# Patient Record
Sex: MALE | Race: WHITE | NOT HISPANIC OR LATINO | Employment: FULL TIME | URBAN - METROPOLITAN AREA
[De-identification: names, ages, dates, MRNs, and addresses within clinical notes are randomized per-mention and may not be internally consistent; named-entity substitution may affect disease eponyms.]

---

## 2017-01-18 ENCOUNTER — TELEPHONE (OUTPATIENT)
Dept: INTERNAL MEDICINE | Age: 42
End: 2017-01-18

## 2017-01-19 ENCOUNTER — OFFICE VISIT (OUTPATIENT)
Dept: INTERNAL MEDICINE | Age: 42
End: 2017-01-19

## 2017-01-19 VITALS
BODY MASS INDEX: 27.55 KG/M2 | OXYGEN SATURATION: 98 % | HEART RATE: 68 BPM | SYSTOLIC BLOOD PRESSURE: 100 MMHG | DIASTOLIC BLOOD PRESSURE: 62 MMHG | HEIGHT: 69 IN | TEMPERATURE: 98.5 F | WEIGHT: 186 LBS

## 2017-01-19 DIAGNOSIS — G89.29 CHRONIC MIDLINE LOW BACK PAIN WITHOUT SCIATICA: Primary | ICD-10-CM

## 2017-01-19 DIAGNOSIS — M62.838 MUSCLE SPASM: ICD-10-CM

## 2017-01-19 DIAGNOSIS — M54.50 CHRONIC MIDLINE LOW BACK PAIN WITHOUT SCIATICA: Primary | ICD-10-CM

## 2017-01-19 PROCEDURE — 99214 OFFICE O/P EST MOD 30 MIN: CPT | Performed by: NURSE PRACTITIONER

## 2017-01-19 RX ORDER — CEPHALEXIN 500 MG/1
500 CAPSULE ORAL
COMMUNITY
Start: 2017-01-18 | End: 2017-01-23

## 2017-01-19 RX ORDER — CYCLOBENZAPRINE HCL 5 MG
10 TABLET ORAL 3 TIMES DAILY PRN
Qty: 21 TABLET | Refills: 0 | Status: SHIPPED | OUTPATIENT
Start: 2017-01-19 | End: 2017-01-27 | Stop reason: SDUPTHER

## 2017-01-19 NOTE — MR AVS SNAPSHOT
Sadiq CECILIO Alexander   1/19/2017 9:40 AM   Office Visit    Dept Phone:  [phone]   Encounter #:  [id number]    Provider:  CIERA Luna   Department:  [facility]                Your Full Care Plan              Today's Medication Changes          These changes are accurate as of: 1/19/17 10:56 AM.  If you have any questions, ask your nurse or doctor.               Medication(s)that have changed:     cyclobenzaprine 5 MG tablet   Commonly known as:  FLEXERIL   Take 2 tablets by mouth 3 (Three) Times a Day As Needed for muscle spasms.   What changed:    - how much to take  - when to take this   Changed by:  CIERA Luna            Where to Get Your Medications      These medications were sent to [facility]/pharmacy #[id number] - [place], [place] - [address] - [phone]  - [phone]   [address], [facility] [address]     Phone:  [phone]     cyclobenzaprine 5 MG tablet                  Your Updated Medication List          This list is accurate as of: 1/19/17 10:56 AM.  Always use your most recent med list.                cephalexin 500 MG capsule   Commonly known as:  KEFLEX       cyclobenzaprine 5 MG tablet   Commonly known as:  FLEXERIL   Take 2 tablets by mouth 3 (Three) Times a Day As Needed for muscle spasms.       ZOLMitriptan 5 MG disintegrating tablet   Commonly known as:  ZOMIG-ZMT               We Performed the Following     MRI Lumbar Spine Without Contrast       You Were Diagnosed With        Codes Comments    Chronic midline low back pain without sciatica    -  Primary ICD-10-CM: M54.5, G89.29  ICD-9-CM: 724.2, 338.29     Muscle spasm     ICD-10-CM: M62.838  ICD-9-CM: 728.85       Instructions     None    Patient Instructions History      Upcoming Appointments     Visit Type Date Time Department    OFFICE VISIT 1/19/2017  9:40 AM YANET BARRETO 4007      [employer] Signup     [facility] [facility] allows you to send  "messages to your doctor, view your test results, renew your prescriptions, schedule appointments, and more. To sign up, go to [email or web address] and click on the Sign Up Now link in the New User? box. Enter your [employer] Activation Code exactly as it appears below along with the last four digits of your Social Security Number and your Date of Birth () to complete the sign-up process. If you do not sign up before the expiration date, you must request a new code.    [employer] Activation Code: [email or web address]  Expires: 2017  5:40 AM    If you have questions, you can email [email or web address] or call [phone] to talk to our [employer] staff. Remember, [employer] is NOT to be used for urgent needs. For medical emergencies, dial 911.               Other Info from Your Visit           Allergies     No Known Allergies      Reason for Visit     Back Pain F/U back pain  still in pain    thumb injury Cut thumb @ home last night F/U er visit [facility] / in care everywhere      Vital Signs     Blood Pressure Pulse Temperature Height Weight Oxygen Saturation    100/62 68 98.5 °F (36.9 °C) 69\" (175.3 cm) 186 lb (84.4 kg) 98%    Body Mass Index Smoking Status                27.47 kg/m2 Former Smoker          Problems and Diagnoses Noted     Chronic midline low back pain without sciatica    -  Primary    Muscle spasm            "

## 2017-01-19 NOTE — PROGRESS NOTES
Sadiq HERNANDES Alexander / 41 y.o. / male  01/19/2017      CC:  Main reason(s) for today's visit: Back Pain (F/U back pain 12/19 still in pain) and thumb injury (Cut thumb @ home last night F/U er visit [facility] / in care everywhere)      HPI:   Patient states that he is still having low back pain. X-ray lumbar spine was performed and results show L1 compression. States that the pain is more muscular in nature. States the muscle pain is constant. He reports that the pain does radiate to the right hip, but not on a consistent basis. Takes ibuprofen for pain. States that he has also had chiropractic care and PT with dry needling. Patient also states that he has used TENS unit. He does have a PT that he works with.  Patient state that last night he cut the tip of the left thumb and had stitches placed at  [facility].     The following portions of the patient's history were reviewed and updated as appropriate: past medical history and past surgical history.    ASSESSMENT & PLAN:    Problem List Items Addressed This Visit     None      Visit Diagnoses     Chronic midline low back pain without sciatica    -  Primary    Relevant Medications    cyclobenzaprine (FLEXERIL) 5 MG tablet    Other Relevant Orders    MRI Lumbar Spine Without Contrast    Muscle spasm        Relevant Medications    cyclobenzaprine (FLEXERIL) 5 MG tablet        Orders Placed This Encounter   Procedures   • MRI Lumbar Spine Without Contrast       · Summary/Discussion:     Chronic LBP: Patient with H/O chronic low back pain here for follow-up. States pain is still present despite rest, PT and medication. X-ray lumbar spine reviewed and reveled L-1 compression. Patient will be ordered MRI of lumbar spine for further evaluation.  Depending on results of MRI, may consider referral to pain management for possible epidural injections or trigger point injections.  He will be given a medication for muscle spasms and advised to refrain from driving, or activities that  require extreme mental alertness as this medication may cause dizziness and drowsiness.  Patient was advised to contact the office if symptoms do not improve.    Follow-up: No Follow-up on file.     No future appointments.  ____________________________________________________________________    REVIEW OF SYSTEMS    Review of Systems   Constitutional: Negative for activity change, appetite change, chills, diaphoresis, fatigue and fever.   HENT: Negative for congestion, dental problem, ear discharge, facial swelling, hearing loss, mouth sores, sore throat, trouble swallowing and voice change.    Eyes: Negative.    Respiratory: Negative for apnea, cough, choking, chest tightness, shortness of breath, wheezing and stridor.    Cardiovascular: Negative for chest pain, palpitations and leg swelling.   Gastrointestinal: Negative for abdominal distention, abdominal pain, anal bleeding, blood in stool, constipation, diarrhea, nausea, rectal pain and vomiting.   Endocrine: Negative for cold intolerance and heat intolerance.   Genitourinary: Negative for decreased urine volume, difficulty urinating, dysuria, enuresis, flank pain, frequency, genital sores, hematuria and urgency.   Musculoskeletal: Positive for arthralgias (Chronic low back pain. ). Negative for back pain, gait problem, joint swelling, myalgias, neck pain and neck stiffness.   Skin: Negative for color change, pallor, rash and wound.   Allergic/Immunologic: Negative for environmental allergies, food allergies and immunocompromised state.   Neurological: Negative for dizziness, tremors, seizures, syncope, facial asymmetry, speech difficulty, weakness, light-headedness, numbness and headaches.   Hematological: Negative for adenopathy. Does not bruise/bleed easily.   Psychiatric/Behavioral: Negative for agitation, confusion, decreased concentration, self-injury, sleep disturbance and suicidal ideas. The patient is not nervous/anxious and is not hyperactive.    All  "other systems reviewed and are negative.    Other: As noted per HPI      VITALS    Visit Vitals   • /62   • Pulse 68   • Temp 98.5 °F (36.9 °C)   • Ht 69\" (175.3 cm)   • Wt 186 lb (84.4 kg)   • SpO2 98%   • BMI 27.47 kg/m2     BP Readings from Last 3 Encounters:   01/19/17 100/62   12/19/16 114/72   09/02/16 110/64     Wt Readings from Last 3 Encounters:   01/19/17 186 lb (84.4 kg)   12/19/16 182 lb (82.6 kg)   09/06/16 180 lb (81.6 kg)      Body mass index is 27.47 kg/(m^2).    PHYSICAL EXAMINATION    Physical Exam   Constitutional: He is oriented to person, place, and time. He appears well-developed and well-nourished.   HENT:   Head: Normocephalic.   Eyes: Conjunctivae are normal. Pupils are equal, round, and reactive to light.   Neck: Normal range of motion.   Cardiovascular: Normal rate and regular rhythm.    Pulmonary/Chest: Effort normal and breath sounds normal.   Musculoskeletal: Normal range of motion.   Neurological: He is alert and oriented to person, place, and time.   Skin: Skin is warm and dry.   Psychiatric: He has a normal mood and affect. His behavior is normal.   Vitals reviewed.        REVIEWED DATA:    Labs:   Lab Results   Component Value Date     02/13/2015    K 3.4 (L) 02/13/2015    AST 17 02/13/2015    ALT 19 02/13/2015    BUN 19 02/13/2015    CREATININE 1.34 (H) 02/13/2015       No results found for: GLU, HGBA1C    No results found for: LDL, HDL, TRIG, CHOLHDLRATIO    No results found for: TSH, FREET4       Lab Results   Component Value Date    WBC 7.62 02/13/2015    HGB 15.6 02/13/2015     02/13/2015        Imaging:        Medical Tests:        Summary of old records / correspondence / consultant report:        Request outside records:          ALLERGIES:    Review of patient's allergies indicates no known allergies.    MEDICATIONS:  Current Outpatient Prescriptions   Medication Sig Dispense Refill   • cephalexin (KEFLEX) 500 MG capsule Take 500 mg by mouth.     • " cyclobenzaprine (FLEXERIL) 5 MG tablet Take 2 tablets by mouth 3 (Three) Times a Day As Needed for muscle spasms. 21 tablet 0   • ZOLMitriptan (ZOMIG-ZMT) 5 MG disintegrating tablet Take  by mouth.       No current facility-administered medications for this visit.        [facility]    The following portions of the patient's history were reviewed and updated as appropriate: Allergies / Current Medications / Past Medical History / Surgical History / Social History / Family History    PROBLEM LIST:    Patient Active Problem List   Diagnosis   • Colon polyp   • Internal hemorrhoids   • Migraine   • Nicotine dependence       PAST MEDICAL HX:    Past Medical History   Diagnosis Date   • Migraines        PAST SURGICAL HX:    Past Surgical History   Procedure Laterality Date   • Shoulder surgery Bilateral      Right 2/18/1965/Left 6/2005   • Colonoscopy  2015     Dr. Allen       SOCIAL HX:    Social History     Social History   • Marital status: Unknown     Spouse name: N/A   • Number of children: 3   • Years of education: N/A     Social History Main Topics   • Smoking status: Former Smoker   • Smokeless tobacco: Never Used   • Alcohol use Yes      Comment: Rarely   • Drug use: No   • Sexual activity: Yes     Partners: Male     Other Topics Concern   • None     Social History Narrative       FAMILY HX:    History reviewed. No pertinent family history.

## 2017-01-25 ENCOUNTER — HOSPITAL ENCOUNTER (OUTPATIENT)
Dept: MRI IMAGING | Facility: HOSPITAL | Age: 42
Discharge: HOME | End: 2017-01-25
Admitting: NURSE PRACTITIONER

## 2017-01-25 PROCEDURE — 72148 MRI LUMBAR SPINE W/O DYE: CPT

## 2017-01-27 ENCOUNTER — OFFICE VISIT (OUTPATIENT)
Dept: INTERNAL MEDICINE | Age: 42
End: 2017-01-27

## 2017-01-27 VITALS
HEIGHT: 69 IN | WEIGHT: 187 LBS | HEART RATE: 76 BPM | BODY MASS INDEX: 27.7 KG/M2 | DIASTOLIC BLOOD PRESSURE: 74 MMHG | TEMPERATURE: 97.8 F | SYSTOLIC BLOOD PRESSURE: 108 MMHG | OXYGEN SATURATION: 98 %

## 2017-01-27 DIAGNOSIS — S61.012A LACERATION OF LEFT THUMB, INITIAL ENCOUNTER: ICD-10-CM

## 2017-01-27 DIAGNOSIS — M54.40 CHRONIC MIDLINE LOW BACK PAIN WITH SCIATICA, SCIATICA LATERALITY UNSPECIFIED: Primary | ICD-10-CM

## 2017-01-27 DIAGNOSIS — M62.838 MUSCLE SPASM: ICD-10-CM

## 2017-01-27 DIAGNOSIS — M54.50 CHRONIC MIDLINE LOW BACK PAIN WITHOUT SCIATICA: ICD-10-CM

## 2017-01-27 DIAGNOSIS — G89.29 CHRONIC MIDLINE LOW BACK PAIN WITHOUT SCIATICA: ICD-10-CM

## 2017-01-27 DIAGNOSIS — G89.29 CHRONIC MIDLINE LOW BACK PAIN WITH SCIATICA, SCIATICA LATERALITY UNSPECIFIED: Primary | ICD-10-CM

## 2017-01-27 PROCEDURE — 99214 OFFICE O/P EST MOD 30 MIN: CPT | Performed by: NURSE PRACTITIONER

## 2017-01-27 RX ORDER — CYCLOBENZAPRINE HCL 5 MG
10 TABLET ORAL 3 TIMES DAILY PRN
Qty: 42 TABLET | Refills: 0 | Status: SHIPPED | OUTPATIENT
Start: 2017-01-27 | End: 2017-02-14 | Stop reason: SDUPTHER

## 2017-01-27 NOTE — MR AVS SNAPSHOT
Sadiq Alexander   1/27/2017 9:00 AM   Office Visit    Dept Phone:  [phone]   Encounter #:  [id number]    Provider:  CIERA Luna   Department:  [facility]                Your Full Care Plan              Where to Get Your Medications      These medications were sent to [facility]/pharmacy #[id number] - [place], [place] - [address] - [phone]  - [phone]   [address], [facility] [address]     Phone:  [phone]     cyclobenzaprine 5 MG tablet            Your Updated Medication List          This list is accurate as of: 1/27/17  9:58 AM.  Always use your most recent med list.                cyclobenzaprine 5 MG tablet   Commonly known as:  FLEXERIL   Take 2 tablets by mouth 3 (Three) Times a Day As Needed for muscle spasms.       ZOLMitriptan 5 MG disintegrating tablet   Commonly known as:  ZOMIG-ZMT               We Performed the Following     Ambulatory Referral to Orthopedic Surgery       You Were Diagnosed With        Codes Comments    Chronic midline low back pain with sciatica, sciatica laterality unspecified    -  Primary ICD-10-CM: M54.40, G89.29  ICD-9-CM: 724.2, 724.3, 338.29     Muscle spasm     ICD-10-CM: M62.838  ICD-9-CM: 728.85     Chronic midline low back pain without sciatica     ICD-10-CM: M54.5, G89.29  ICD-9-CM: 724.2, 338.29       Instructions     None    Patient Instructions History      Upcoming Appointments     Visit Type Date Time Department    OFFICE VISIT 1/27/2017  9:00 AM YANET KINNEY KRSGE 4002      [employer] Signup     [employer] allows you to send messages to your doctor, view your test results, renew your prescriptions, schedule appointments, and more. To sign up, go to [email or web address] and click on the Sign Up Now link in the New User? box. Enter your [employer] Activation Code exactly as it appears below along with the last four digits of your Social Security Number and your Date of Birth  "() to complete the sign-up process. If you do not sign up before the expiration date, you must request a new code.    [employer] Activation Code: [email or web address]  Expires: 2/10/2017  9:57 AM    If you have questions, you can email [email or web address] or call [phone] to talk to our [employer] staff. Remember, [employer] is NOT to be used for urgent needs. For medical emergencies, dial 911.               Other Info from Your Visit           Allergies     No Known Allergies      Reason for Visit     Suture / Staple Removal Stures removed from leeft thumb    Back Pain F/U MRI results / in chart / media tab      Vital Signs     Blood Pressure Pulse Temperature Height Weight Oxygen Saturation    108/74 76 97.8 °F (36.6 °C) 69\" (175.3 cm) 187 lb (84.8 kg) 98%    Body Mass Index Smoking Status                27.62 kg/m2 Former Smoker          Problems and Diagnoses Noted     Chronic midline low back pain with sciatica, sciatica laterality unspecified    -  Primary    Muscle spasm        Chronic midline low back pain without sciatica            "

## 2017-01-27 NOTE — PROGRESS NOTES
Sadiq HERNANDES Alexander / 41 y.o. / male  01/27/2017      CC:  Main reason(s) for today's visit: Suture / Staple Removal (Stures removed from leeft thumb) and Back Pain (F/U MRI results / in chart / media tab)      HPI:   Patient presents to the office to have staples removed from his left thumb.  Patient had cut his last thumb while chopping up vegetables.  He went to the [facility] where sutures were placed in, and patient was placed on antibiotics.  He is also here to review the MRI results from previous visit.      The following portions of the patient's history were reviewed and updated as appropriate: past medical history and past surgical history.    ASSESSMENT & PLAN:    Problem List Items Addressed This Visit     None      Visit Diagnoses     Chronic midline low back pain with sciatica, sciatica laterality unspecified    -  Primary    Relevant Orders    Ambulatory Referral to Orthopedic Surgery    Muscle spasm        Relevant Medications    cyclobenzaprine (FLEXERIL) 5 MG tablet    Chronic midline low back pain without sciatica        Relevant Medications    cyclobenzaprine (FLEXERIL) 5 MG tablet    Laceration of left thumb, initial encounter            Orders Placed This Encounter   Procedures   • Ambulatory Referral to Orthopedic Surgery       · Summary/Discussion:     1.  Chronic midline low back pain with sciatica: Patient with chronic low back pain with sciatica.  At previous visit MRI was ordered to evaluate this complaint.  I reviewed MRI and discuss the results with this patient.  MRI results show relatively mild foraminal narrowing is identified within the lower lumbar spine from L3-4 down to L5-S1.  Results also show moderate facet arthropathy.  Patient will be referred to orthopedic for further evaluation.    2.  Muscle spasm: Patient with complaints of chronic muscle spasm in the low back.  Patient has had physical therapy and has helped minimally.  Patient will be given a muscle relaxer to be  used as directed.  He was advised to refrain from driving or any activities that require extreme mental alertness as this may cause dizziness or drowsiness.  Patient was advised to monitor for worsening of symptoms and contact the office if symptoms do not improve.     3.  Laceration left thumb: Patient is laceration in the left thumb resulted when patient was cutting vegetables and sliced the tip of his thumb.  He was evaluated at the [facility] where he was placed on antibiotics and sutures placed.  Patient presented to the office for suture removal.  We removed 3 stitches from the left thumb.  Patient tolerated procedure well.  The skin of the left thumb remains intact.  No oozing, redness, swelling noted.  We applied topical antibiotic ointment to the thumb.  Advised patient to monitor for any signs of infection to include swelling, redness, heat, discharge.  Patient was also advised to monitor the site and contact the office if the site of the laceration reopens.  If this should happen he is to contact the office immediately.     Follow-up: Return if symptoms worsen or fail to improve.     No future appointments.  ____________________________________________________________________    REVIEW OF SYSTEMS    Review of Systems   Constitutional: Negative for activity change, appetite change, chills, diaphoresis, fatigue and fever.   HENT: Negative for congestion, dental problem, ear discharge, facial swelling, hearing loss, mouth sores, sore throat, trouble swallowing and voice change.    Eyes: Negative.    Respiratory: Negative for apnea, cough, choking, chest tightness, shortness of breath, wheezing and stridor.    Cardiovascular: Negative for chest pain, palpitations and leg swelling.   Gastrointestinal: Negative for abdominal distention, abdominal pain, anal bleeding, blood in stool, constipation, diarrhea, nausea, rectal pain and vomiting.   Endocrine: Negative for cold intolerance and heat  "intolerance.   Genitourinary: Negative for decreased urine volume, difficulty urinating, dysuria, enuresis, flank pain, frequency, genital sores, hematuria and urgency.   Musculoskeletal: Negative for arthralgias, back pain, gait problem, joint swelling, myalgias, neck pain and neck stiffness.        Chronic low back pain and muscle spasm   Skin: Negative for color change, pallor, rash and wound.        Patient has sutures in the left thumb.   Allergic/Immunologic: Negative for environmental allergies, food allergies and immunocompromised state.   Neurological: Negative for dizziness, tremors, seizures, syncope, facial asymmetry, speech difficulty, weakness, light-headedness, numbness and headaches.   Hematological: Negative for adenopathy. Does not bruise/bleed easily.   Psychiatric/Behavioral: Negative for agitation, confusion, decreased concentration, self-injury, sleep disturbance and suicidal ideas. The patient is not nervous/anxious and is not hyperactive.    All other systems reviewed and are negative.    Other: As noted per HPI      VITALS    Visit Vitals   • /74   • Pulse 76   • Temp 97.8 °F (36.6 °C)   • Ht 69\" (175.3 cm)   • Wt 187 lb (84.8 kg)   • SpO2 98%   • BMI 27.62 kg/m2     BP Readings from Last 3 Encounters:   01/27/17 108/74   01/19/17 100/62   12/19/16 114/72     Wt Readings from Last 3 Encounters:   01/27/17 187 lb (84.8 kg)   01/19/17 186 lb (84.4 kg)   01/25/17 180 lb (81.6 kg)      Body mass index is 27.62 kg/(m^2).    PHYSICAL EXAMINATION    Physical Exam   Constitutional: He is oriented to person, place, and time. He appears well-developed and well-nourished.   HENT:   Head: Normocephalic.   Eyes: Conjunctivae are normal. Pupils are equal, round, and reactive to light.   Neck: Normal range of motion.   Cardiovascular: Normal rate and regular rhythm.    Pulmonary/Chest: Effort normal and breath sounds normal.   Musculoskeletal: Normal range of motion.   Neurological: He is alert and " oriented to person, place, and time.   Skin: Skin is warm and dry.   Patient right thumb with sutures. We removed the sutures and the skin is intact. No redness, heat or swelling noted. Patient is taking oral antibiotics at this time.   Psychiatric: He has a normal mood and affect. His behavior is normal.   Vitals reviewed.        REVIEWED DATA:    Labs:   Lab Results   Component Value Date     02/13/2015    K 3.4 (L) 02/13/2015    AST 17 02/13/2015    ALT 19 02/13/2015    BUN 19 02/13/2015    CREATININE 1.34 (H) 02/13/2015       No results found for: GLU, HGBA1C    No results found for: LDL, HDL, TRIG, CHOLHDLRATIO    No results found for: TSH, FREET4       Lab Results   Component Value Date    WBC 7.62 02/13/2015    HGB 15.6 02/13/2015     02/13/2015        Imaging:        Medical Tests:        Summary of old records / correspondence / consultant report:        Request outside records:          ALLERGIES:    Review of patient's allergies indicates no known allergies.    MEDICATIONS:  Current Outpatient Prescriptions   Medication Sig Dispense Refill   • cyclobenzaprine (FLEXERIL) 5 MG tablet Take 2 tablets by mouth 3 (Three) Times a Day As Needed for muscle spasms. 42 tablet 0   • ZOLMitriptan (ZOMIG-ZMT) 5 MG disintegrating tablet Take  by mouth.       No current facility-administered medications for this visit.        [facility]    The following portions of the patient's history were reviewed and updated as appropriate: Allergies / Current Medications / Past Medical History / Surgical History / Social History / Family History    PROBLEM LIST:    Patient Active Problem List   Diagnosis   • Colon polyp   • Internal hemorrhoids   • Migraine   • Nicotine dependence       PAST MEDICAL HX:    Past Medical History   Diagnosis Date   • Migraines        PAST SURGICAL HX:    Past Surgical History   Procedure Laterality Date   • Shoulder surgery Bilateral      Right 2/18/1965/Left 6/2005   • Colonoscopy  2015       Tiffany       SOCIAL HX:    Social History     Social History   • Marital status: Unknown     Spouse name: N/A   • Number of children: 3   • Years of education: N/A     Social History Main Topics   • Smoking status: Former Smoker   • Smokeless tobacco: Never Used   • Alcohol use Yes      Comment: Rarely   • Drug use: No   • Sexual activity: Yes     Partners: Male     Other Topics Concern   • None     Social History Narrative   • None       FAMILY HX:    History reviewed. No pertinent family history.

## 2017-02-13 ENCOUNTER — TELEPHONE (OUTPATIENT)
Dept: INTERNAL MEDICINE | Age: 42
End: 2017-02-13

## 2017-02-14 ENCOUNTER — TELEPHONE (OUTPATIENT)
Dept: INTERNAL MEDICINE | Age: 42
End: 2017-02-14

## 2017-02-14 DIAGNOSIS — M62.838 MUSCLE SPASM: ICD-10-CM

## 2017-02-14 DIAGNOSIS — M54.50 CHRONIC MIDLINE LOW BACK PAIN WITHOUT SCIATICA: ICD-10-CM

## 2017-02-14 DIAGNOSIS — G89.29 CHRONIC MIDLINE LOW BACK PAIN WITHOUT SCIATICA: ICD-10-CM

## 2017-02-14 RX ORDER — CYCLOBENZAPRINE HCL 5 MG
10 TABLET ORAL 3 TIMES DAILY PRN
Qty: 30 TABLET | Refills: 0 | Status: SHIPPED | OUTPATIENT
Start: 2017-02-14 | End: 2017-05-24

## 2017-02-22 ENCOUNTER — OFFICE VISIT (OUTPATIENT)
Dept: ORTHOPEDIC SURGERY | Facility: CLINIC | Age: 42
End: 2017-02-22

## 2017-02-22 VITALS — BODY MASS INDEX: 26.66 KG/M2 | TEMPERATURE: 97.5 F | HEIGHT: 69 IN | WEIGHT: 180 LBS

## 2017-02-22 DIAGNOSIS — M54.50 CHRONIC LOW BACK PAIN WITHOUT SCIATICA, UNSPECIFIED BACK PAIN LATERALITY: Primary | ICD-10-CM

## 2017-02-22 DIAGNOSIS — G89.29 CHRONIC LOW BACK PAIN WITHOUT SCIATICA, UNSPECIFIED BACK PAIN LATERALITY: Primary | ICD-10-CM

## 2017-02-22 PROCEDURE — 99204 OFFICE O/P NEW MOD 45 MIN: CPT | Performed by: ORTHOPAEDIC SURGERY

## 2017-02-22 NOTE — PROGRESS NOTES
New patient or new problem visit    Chief Complaint   Patient presents with   • Lumbar Spine - Pain       HPI: He complains of moderate, intermittent stabbing aching low back pain without radiation.  The aggravated by standing sitting and driving and helped with anti-inflammatory agents and rest.  Physical therapy is helped somewhat and he started that 3 weeks ago.  Did some.  Trooping in the  and thinks him of the trauma may have resulted from this.    PFSH: See chart- reviewed    Review of Systems   Constitutional: Negative for chills, fever and unexpected weight change.   HENT: Negative.  Negative for trouble swallowing and voice change.    Eyes: Negative.  Negative for visual disturbance.   Respiratory: Negative.  Negative for cough and shortness of breath.    Cardiovascular: Negative.  Negative for chest pain and leg swelling.   Gastrointestinal: Negative.  Negative for abdominal pain, nausea and vomiting.   Endocrine: Negative.  Negative for cold intolerance and heat intolerance.   Genitourinary: Negative.  Negative for difficulty urinating, frequency and urgency.   Musculoskeletal: Positive for back pain.   Skin: Negative.  Negative for rash and wound.   Allergic/Immunologic: Negative.  Negative for immunocompromised state.   Neurological: Positive for weakness. Negative for numbness.   Hematological: Negative.  Does not bruise/bleed easily.   Psychiatric/Behavioral: Negative.  Negative for dysphoric mood. The patient is not nervous/anxious.        PE: Constitutional: Vital signs above-noted.  Awake, alert and oriented    Psychiatric: Affect and insight do not appear grossly disturbed.    Pulmonary: Breathing is unlabored, color is good.    Skin: Warm, dry and normal turgor    Cardiac:  pedal pulses intact.  No edema.    Eyesight and hearing appear adequate for examination purposes      Musculoskeletal:  There is slight tenderness to percussion and palpation of the left lumbar spine. Motion appears  undisturbed.  Posture is unremarkable to coronal and sagittal inspection.    The skin about the area is intact.  There is no palpable or visible deformity.  There is moderate local spasm.       Neurologic:   Reflexes are absent in the patellae and achilles.   Motor function is undisturbed in quadriceps, EHL, and gastrocnemius      Sensation appears symmetrically intact to light touch   .  In the bilateral lower extremities there is no evidence of atrophy.   Clonus is absent..  Gait appears undisturbed.  SLR test negative      MEDICAL DECISION MAKING    XRAY: Multiple x-rays of lumbar spine were reviewed and demonstrate the very slight trapezoidal shape of the L1 vertebra suggestive of old fracture, and mild multilevel disc degeneration.  I agree with the radiologist report.  I scan of the lumbar spine showed L1 2 disc space narrowing and T2 signal change and then some signal change in the lower reaches, need by left L3 4 far lateral disc protrusion.  The radiologist did not make as much of the disc protrusion and did comment on posterior synovial cyst at L5-S1 which I don't think clinically relevant.    Other: n/a    Impression: Lumbar back pain from disc degeneration and facet arthropathy    Plan: Commended cessation of smoking, cardiovascular exercise continued resistance exercises and physical therapy, and when necessary follow-up.

## 2017-03-29 ENCOUNTER — TELEPHONE (OUTPATIENT)
Dept: INTERNAL MEDICINE | Age: 42
End: 2017-03-29

## 2017-03-29 DIAGNOSIS — G43.909 MIGRAINE WITHOUT STATUS MIGRAINOSUS, NOT INTRACTABLE, UNSPECIFIED MIGRAINE TYPE: Primary | ICD-10-CM

## 2017-03-29 RX ORDER — ZOLMITRIPTAN 5 MG/1
TABLET, ORALLY DISINTEGRATING ORAL
Qty: 10 TABLET | Refills: 1 | Status: SHIPPED | OUTPATIENT
Start: 2017-03-29 | End: 2017-12-31 | Stop reason: SDUPTHER

## 2017-03-30 ENCOUNTER — TELEPHONE (OUTPATIENT)
Dept: INTERNAL MEDICINE | Age: 42
End: 2017-03-30

## 2017-03-30 DIAGNOSIS — J06.9 UPPER RESPIRATORY TRACT INFECTION, UNSPECIFIED TYPE: Primary | ICD-10-CM

## 2017-03-30 RX ORDER — AMOXICILLIN 500 MG/1
1000 CAPSULE ORAL 2 TIMES DAILY
Qty: 14 CAPSULE | Refills: 0 | Status: SHIPPED | OUTPATIENT
Start: 2017-03-30 | End: 2017-05-24

## 2017-03-30 NOTE — TELEPHONE ENCOUNTER
Pt informed that Dr Jackson was out of the office for the day but Mode took the message since she is on call and switched his medication to amox. 500 mg bid # 14 7 days rf

## 2017-05-24 ENCOUNTER — OFFICE VISIT (OUTPATIENT)
Dept: SURGERY | Facility: CLINIC | Age: 42
End: 2017-05-24

## 2017-05-24 VITALS — BODY MASS INDEX: 27.34 KG/M2 | HEIGHT: 69 IN | OXYGEN SATURATION: 98 % | WEIGHT: 184.6 LBS | HEART RATE: 67 BPM

## 2017-05-24 DIAGNOSIS — K64.5 THROMBOSED EXTERNAL HEMORRHOID: Primary | ICD-10-CM

## 2017-05-24 PROCEDURE — 46320 REMOVAL OF HEMORRHOID CLOT: CPT | Performed by: SURGERY

## 2017-05-31 ENCOUNTER — OFFICE VISIT (OUTPATIENT)
Dept: SURGERY | Facility: CLINIC | Age: 42
End: 2017-05-31

## 2017-05-31 VITALS — OXYGEN SATURATION: 98 % | HEART RATE: 57 BPM | BODY MASS INDEX: 26.94 KG/M2 | WEIGHT: 182.4 LBS

## 2017-05-31 DIAGNOSIS — K64.5 PERIANAL VENOUS THROMBOSIS: Primary | ICD-10-CM

## 2017-05-31 PROCEDURE — 99024 POSTOP FOLLOW-UP VISIT: CPT | Performed by: SURGERY

## 2017-10-23 ENCOUNTER — OFFICE VISIT (OUTPATIENT)
Dept: INTERNAL MEDICINE | Age: 42
End: 2017-10-23

## 2017-10-23 ENCOUNTER — TELEPHONE (OUTPATIENT)
Dept: INTERNAL MEDICINE | Age: 42
End: 2017-10-23

## 2017-10-23 VITALS
SYSTOLIC BLOOD PRESSURE: 114 MMHG | HEIGHT: 69 IN | DIASTOLIC BLOOD PRESSURE: 68 MMHG | BODY MASS INDEX: 27.78 KG/M2 | WEIGHT: 187.6 LBS | TEMPERATURE: 98 F | OXYGEN SATURATION: 99 % | HEART RATE: 84 BPM

## 2017-10-23 DIAGNOSIS — J01.90 ACUTE SINUSITIS, RECURRENCE NOT SPECIFIED, UNSPECIFIED LOCATION: Primary | ICD-10-CM

## 2017-10-23 PROCEDURE — 99213 OFFICE O/P EST LOW 20 MIN: CPT | Performed by: NURSE PRACTITIONER

## 2017-10-23 RX ORDER — BENZONATATE 200 MG/1
200 CAPSULE ORAL 3 TIMES DAILY PRN
Qty: 30 CAPSULE | Refills: 0 | Status: SHIPPED | OUTPATIENT
Start: 2017-10-23 | End: 2018-02-26

## 2017-10-23 RX ORDER — FLUTICASONE PROPIONATE 50 MCG
2 SPRAY, SUSPENSION (ML) NASAL DAILY
COMMUNITY
Start: 2017-10-23 | End: 2022-02-16

## 2017-10-23 NOTE — PROGRESS NOTES
Subjective   Sadiq Alexander is a 42 y.o. male.     URI    This is a new problem. The current episode started yesterday. There has been no fever. Associated symptoms include chest pain, congestion, coughing, sinus pain, a sore throat and wheezing. Pertinent negatives include no ear pain or joint pain. Associated symptoms comments: + nasal congestion, productive cough (green sputum) . Treatments tried: Mucinex.        The following portions of the patient's history were reviewed and updated as appropriate: allergies, current medications, past family history, past medical history, past social history, past surgical history and problem list.    Review of Systems   Constitutional: Negative for fatigue.   HENT: Positive for congestion, sinus pain and sore throat. Negative for ear pain.    Respiratory: Positive for cough and wheezing.    Cardiovascular: Positive for chest pain.   Musculoskeletal: Negative for joint pain.       Objective   Physical Exam   Constitutional: He is oriented to person, place, and time. Vital signs are normal. He appears well-developed and well-nourished. He is cooperative. He does not appear ill. No distress.   HENT:   Head: Normocephalic and atraumatic.   Right Ear: Hearing, external ear and ear canal normal. No drainage or swelling. Tympanic membrane is not injected and not erythematous. A middle ear effusion is present.   Left Ear: Hearing, tympanic membrane, external ear and ear canal normal. No drainage or swelling. Tympanic membrane is not injected and not erythematous.  No middle ear effusion.   Nose: Right sinus exhibits maxillary sinus tenderness and frontal sinus tenderness. Left sinus exhibits maxillary sinus tenderness and frontal sinus tenderness.   Mouth/Throat: Uvula is midline, oropharynx is clear and moist and mucous membranes are normal. Tonsils are 2+ on the right. Tonsils are 2+ on the left. No tonsillar exudate.   Cardiovascular: Normal rate, regular rhythm and normal heart  sounds.    No murmur heard.  Pulmonary/Chest: Effort normal and breath sounds normal. He has no decreased breath sounds. He has no wheezes. He has no rhonchi. He has no rales.   Lymphadenopathy:     He has no cervical adenopathy.        Right cervical: No superficial cervical, no deep cervical and no posterior cervical adenopathy present.       Left cervical: No superficial cervical, no deep cervical and no posterior cervical adenopathy present.   Neurological: He is alert and oriented to person, place, and time.   Skin: Skin is warm, dry and intact.   Psychiatric: He has a normal mood and affect. His speech is normal and behavior is normal. Judgment and thought content normal. Cognition and memory are normal.   Nursing note and vitals reviewed.      Assessment/Plan   Problems Addressed this Visit     None      Visit Diagnoses     Acute sinusitis, recurrence not specified, unspecified location    -  Primary    Relevant Medications    benzonatate (TESSALON) 200 MG capsule    fluticasone (FLONASE) 50 MCG/ACT nasal spray        1. Acute sinusitis, recurrence not specified, unspecified     Discussed appropriate conservative and symptomatic treatment with patient, including use of NSAIDs or Tylenol for fever or pain, increase by mouth fluids, and rest. Discussed when to follow up for recheck, including worsening symptoms such as fever, purulent nasal drainage, worsening cough, productive cough, etc.     - benzonatate (TESSALON) 200 MG capsule; Take 1 capsule by mouth 3 (Three) Times a Day As Needed for Cough.  Dispense: 30 capsule; Refill: 0  - fluticasone (FLONASE) 50 MCG/ACT nasal spray; 2 sprays into each nostril Daily.

## 2017-10-23 NOTE — TELEPHONE ENCOUNTER
Pt called complaining of chest/ head congestion started yesterday. Productive thick yellow sputum. Bad cough, hurts throat and chest when he coughs. Hasn't taken temp.  Pt's # [phone]  Thanks SP

## 2017-12-31 DIAGNOSIS — G43.909 MIGRAINE WITHOUT STATUS MIGRAINOSUS, NOT INTRACTABLE, UNSPECIFIED MIGRAINE TYPE: ICD-10-CM

## 2018-01-02 RX ORDER — ZOLMITRIPTAN 5 MG/1
TABLET, ORALLY DISINTEGRATING ORAL
Qty: 10 TABLET | Refills: 0 | Status: SHIPPED | OUTPATIENT
Start: 2018-01-02 | End: 2018-02-26 | Stop reason: SDUPTHER

## 2018-02-26 ENCOUNTER — OFFICE VISIT (OUTPATIENT)
Dept: INTERNAL MEDICINE | Age: 43
End: 2018-02-26

## 2018-02-26 VITALS
SYSTOLIC BLOOD PRESSURE: 112 MMHG | TEMPERATURE: 98.3 F | OXYGEN SATURATION: 98 % | HEIGHT: 69 IN | HEART RATE: 70 BPM | WEIGHT: 192 LBS | DIASTOLIC BLOOD PRESSURE: 68 MMHG | BODY MASS INDEX: 28.44 KG/M2

## 2018-02-26 DIAGNOSIS — G43.909 MIGRAINE WITHOUT STATUS MIGRAINOSUS, NOT INTRACTABLE, UNSPECIFIED MIGRAINE TYPE: ICD-10-CM

## 2018-02-26 DIAGNOSIS — F41.9 ANXIETY DISORDER, UNSPECIFIED TYPE: ICD-10-CM

## 2018-02-26 DIAGNOSIS — G43.909 MIGRAINE WITHOUT STATUS MIGRAINOSUS, NOT INTRACTABLE, UNSPECIFIED MIGRAINE TYPE: Primary | ICD-10-CM

## 2018-02-26 PROCEDURE — 99214 OFFICE O/P EST MOD 30 MIN: CPT | Performed by: INTERNAL MEDICINE

## 2018-02-26 RX ORDER — ESCITALOPRAM OXALATE 10 MG/1
10 TABLET ORAL DAILY
Qty: 30 TABLET | Refills: 1 | Status: SHIPPED | OUTPATIENT
Start: 2018-02-26 | End: 2018-04-22 | Stop reason: SDUPTHER

## 2018-02-26 NOTE — ASSESSMENT & PLAN NOTE
Will retry escitalopram to see if it will help with his migraines, in part triggered by anxiety/tension/stress.

## 2018-02-26 NOTE — PROGRESS NOTES
"[facility]  SEJAL THACKER M.D.      Sadiq HERNANDES Alexander / 42 y.o. / male  02/26/2018      MEDICATIONS  Current Outpatient Prescriptions   Medication Sig Dispense Refill   • fluticasone (FLONASE) 50 MCG/ACT nasal spray 2 sprays into each nostril Daily.     • ZOLMitriptan (ZOMIG-ZMT) 5 MG disintegrating tablet DISSOLVE 1 TAB ON TONGUE AND SWALLOW AT ONSET OF MIGRAINE. MAY REPEAT ONCE AFTER 2H. MAX 10 MG/DAY 10 tablet 0       VITALS    Visit Vitals   • /68   • Pulse 70   • Temp 98.3 °F (36.8 °C)   • Ht 175.3 cm (69.02\")   • Wt 87.1 kg (192 lb)   • SpO2 98%   • BMI 28.34 kg/m2       BP Readings from Last 3 Encounters:   02/26/18 112/68   10/23/17 114/68   01/27/17 108/74     Wt Readings from Last 3 Encounters:   02/26/18 87.1 kg (192 lb)   10/23/17 85.1 kg (187 lb 9.6 oz)   05/31/17 82.7 kg (182 lb 6.4 oz)      Body mass index is 28.34 kg/(m^2).      CC:  Main reason(s) for today's visit: Migraine (general check up)      HPI:     Patient complains of increased frequency of migraine headaches.  He has had chronic migraine headaches over many years.  She has taken various try cyclic's as well as topiramate in the past.  The last time he has taken Topamax caused mood agitation.  He has had various tolerance issues to try cyclic antidepressants.  He did the best on Lexapro previously.  He has normal level of work related and family related stress.  Zomig continues to help him when taken as needed for migraine headaches.  Denies any other focal neurologic changes.  Known triggers include lack of sleep, stress and anxiety.  Beer or alcohol also seems to trigger migraine headaches the following day.    Patient Care Team:  Charly Thacker MD as PCP - General (Internal Medicine)  ____________________________________________________________________    ASSESSMENT & PLAN:    Problem List Items Addressed This Visit        High    Migraines - Primary    Current Assessment & Plan     Increasing migraine frequency since smoking " cessation. Escitalopram has helped in the past. Could not tolerate topiramate due to increased agitation sx's in the past. Will retry escitalopram 10 mg daily (1/2 x 1 week, then full tab daily). Instructed patient to watch for worsening mood symptoms, suicidal thoughts/ideations.   Continue zolmitriptan as needed. If not improving will refer back to neurologist.          Relevant Medications    ZOLMitriptan (ZOMIG-ZMT) 5 MG disintegrating tablet    escitalopram (LEXAPRO) 10 MG tablet       Medium    Anxiety disorder (Chronic)    Overview     H/o ADHD.          Current Assessment & Plan     Will retry escitalopram to see if it will help with his migraines, in part triggered by anxiety/tension/stress.          Relevant Medications    escitalopram (LEXAPRO) 10 MG tablet           Summary/Discussion:  ·     Return in about 2 months (around 4/26/2018) for Headaches.  And 4 months for CPE.     Future Appointments  Date Time Provider Department Center   4/24/2018 1:40 PM MD [facility] None   6/13/2018 9:00 AM [facility] None   6/20/2018 10:00 AM MD [facility] None     ____________________________________________________________________    REVIEW OF SYSTEMS    Review of Systems   Constitutional: Negative.    Eyes: Negative.    Respiratory: Negative.    Cardiovascular: Negative.    Gastrointestinal: Negative.    Neurological: Positive for headaches.   Psychiatric/Behavioral: The patient is nervous/anxious.          PHYSICAL EXAMINATION    Physical Exam   Constitutional: No distress.   HENT:   Head: Normocephalic.   Eyes: EOM are normal. Pupils are equal, round, and reactive to light.   Cardiovascular: Normal rate and regular rhythm.    Neurological: He is alert.   Psychiatric: His behavior is normal. Judgment and thought content normal. His mood appears anxious. Cognition and memory are normal. He does not exhibit a depressed mood.         REVIEWED DATA:    Labs:       Imaging:         Medical Tests:        Summary of old records / correspondence / consultant report:        Request outside records:       ALLERGIES  No Known Allergies     PFSH:     The following portions of the patient's history were reviewed and updated as appropriate: Allergies / Current Medications / Past Medical History / Surgical History / Social History / Family History    PROBLEM LIST   Patient Active Problem List   Diagnosis   • Colon polyp   • Internal hemorrhoids   • Migraines   • Nicotine dependence   • Anxiety disorder       PAST MEDICAL HISTORY  Past Medical History:   Diagnosis Date   • Colon polyps    • Migraines        SURGICAL HISTORY  Past Surgical History:   Procedure Laterality Date   • COLONOSCOPY N/A 03/11/2015    Two 4-6 mm polyps at the recto-sigmoid and in the mid ascending colon (PATH: tubulovillous adenoma w/ low-grade dysplasia & hyperplastic colon polyp at 20 cm), internal hemorrhoids; Dr. Chato Allen   • SHOULDER SURGERY Bilateral 2/18/1965, 6/2005       SOCIAL HISTORY  Social History     Social History   • Marital status: Unknown     Spouse name: N/A   • Number of children: 3   • Years of education: N/A     Social History Main Topics   • Smoking status: Former Smoker   • Smokeless tobacco: Current User     Types: Chew      Comment: pt is trying to quit   • Alcohol use Yes      Comment: 2-3/week   • Drug use: No   • Sexual activity: Yes     Partners: Male     Other Topics Concern   • None     Social History Narrative       FAMILY HISTORY  History reviewed. No pertinent family history.      **Dragon Disclaimer:   Much of this encounter note is an electronic transcription/translation of spoken language to printed text. The electronic translation of spoken language may permit erroneous, or at times, nonsensical words or phrases to be inadvertently transcribed. Although I have reviewed the note for such errors, some may still exist.

## 2018-02-26 NOTE — ASSESSMENT & PLAN NOTE
Increasing migraine frequency since smoking cessation. Escitalopram has helped in the past. Could not tolerate topiramate due to increased agitation sx's in the past. Will retry escitalopram 10 mg daily (1/2 x 1 week, then full tab daily). Instructed patient to watch for worsening mood symptoms, suicidal thoughts/ideations.   Continue zolmitriptan as needed. If not improving will refer back to neurologist.

## 2018-02-27 RX ORDER — ZOLMITRIPTAN 5 MG/1
TABLET, ORALLY DISINTEGRATING ORAL
Qty: 10 TABLET | Refills: 0 | Status: SHIPPED | OUTPATIENT
Start: 2018-02-27 | End: 2018-09-13 | Stop reason: SDUPTHER

## 2018-04-22 DIAGNOSIS — F41.9 ANXIETY DISORDER, UNSPECIFIED TYPE: ICD-10-CM

## 2018-04-22 DIAGNOSIS — G43.909 MIGRAINE WITHOUT STATUS MIGRAINOSUS, NOT INTRACTABLE, UNSPECIFIED MIGRAINE TYPE: ICD-10-CM

## 2018-04-23 RX ORDER — ESCITALOPRAM OXALATE 10 MG/1
10 TABLET ORAL DAILY
Qty: 30 TABLET | Refills: 2 | Status: SHIPPED | OUTPATIENT
Start: 2018-04-23 | End: 2018-07-28 | Stop reason: SDUPTHER

## 2018-04-24 ENCOUNTER — OFFICE VISIT (OUTPATIENT)
Dept: INTERNAL MEDICINE | Age: 43
End: 2018-04-24

## 2018-04-24 VITALS
SYSTOLIC BLOOD PRESSURE: 118 MMHG | DIASTOLIC BLOOD PRESSURE: 70 MMHG | HEART RATE: 76 BPM | WEIGHT: 191 LBS | TEMPERATURE: 98.3 F | HEIGHT: 69 IN | OXYGEN SATURATION: 98 % | BODY MASS INDEX: 28.29 KG/M2

## 2018-04-24 DIAGNOSIS — F41.9 ANXIETY DISORDER, UNSPECIFIED TYPE: Chronic | ICD-10-CM

## 2018-04-24 DIAGNOSIS — G43.909 MIGRAINE WITHOUT STATUS MIGRAINOSUS, NOT INTRACTABLE, UNSPECIFIED MIGRAINE TYPE: Primary | ICD-10-CM

## 2018-04-24 PROCEDURE — 99213 OFFICE O/P EST LOW 20 MIN: CPT | Performed by: INTERNAL MEDICINE

## 2018-04-24 NOTE — PROGRESS NOTES
"[facility]  SEJAL THACKER M.D.      Sadiq CECILIO Alexander / 42 y.o. / male  04/24/2018      ASSESSMENT & PLAN:    Problem List Items Addressed This Visit        High    Migraines - Primary    Current Assessment & Plan     Better. Continue escitalopram and zolmitriptan as needed.            Relevant Medications    ZOLMitriptan (ZOMIG-ZMT) 5 MG disintegrating tablet    escitalopram (LEXAPRO) 10 MG tablet       Medium    Anxiety disorder (Chronic)    Overview     H/o ADHD.          Current Assessment & Plan     Doing much better. Continue escitalopram 10 mg daily.          Relevant Medications    escitalopram (LEXAPRO) 10 MG tablet      Other Visit Diagnoses    None.       No orders of the defined types were placed in this encounter.      Summary/Discussion:      Return in about 4 months (around 8/24/2018) for Next scheduled follow up.    ____________________________________________________________________    MEDICATIONS  Current Outpatient Prescriptions   Medication Sig Dispense Refill   • escitalopram (LEXAPRO) 10 MG tablet TAKE 1 TABLET BY MOUTH DAILY. 30 tablet 2   • fluticasone (FLONASE) 50 MCG/ACT nasal spray 2 sprays into each nostril Daily.     • ZOLMitriptan (ZOMIG-ZMT) 5 MG disintegrating tablet DISSOLVE 1 TAB ON TONGUE AND SWALLOW AT ONSET OF MIGRAINE. MAY REPEAT ONCE AFTER 2H. MAX 10 MG/DAY 10 tablet 0     No current facility-administered medications for this visit.          VITALS    Visit Vitals  /70   Pulse 76   Temp 98.3 °F (36.8 °C)   Ht 175.3 cm (69.02\")   Wt 86.6 kg (191 lb)   SpO2 98%   BMI 28.19 kg/m²       BP Readings from Last 3 Encounters:   04/24/18 118/70   02/26/18 112/68   10/23/17 114/68     Wt Readings from Last 3 Encounters:   04/24/18 86.6 kg (191 lb)   02/26/18 87.1 kg (192 lb)   10/23/17 85.1 kg (187 lb 9.6 oz)      Body mass index is 28.19 kg/m².    CC:  Main reason(s) for today's visit: migraines      HPI:     Migraine headaches are much better under control since starting " escitalopram.  Anxiety and tension and general mood are also significantly improved.  Denies problems with the medication.    Patient Care Team:  Charly Jackson MD as PCP - General (Internal Medicine)  ____________________________________________________________________    REVIEW OF SYSTEMS    Review of Systems  Decreased headaches  Mood improved, less anxious  GI neg   neg    PHYSICAL EXAMINATION    Physical Exam  Mood improved  Normal thought and judgment    REVIEWED DATA:    Labs:     Lab Results   Component Value Date     02/13/2015    K 3.4 (L) 02/13/2015    AST 17 02/13/2015    ALT 19 02/13/2015    BUN 19 02/13/2015    CREATININE 1.34 (H) 02/13/2015       Lab Results   Component Value Date    GLUCOSE 70 02/13/2015       No results found for: LDL, HDL, TRIG, CHOLHDLRATIO    No results found for: TSH, FREET4    Lab Results   Component Value Date    WBC 7.62 02/13/2015    HGB 15.6 02/13/2015     02/13/2015        Imaging:         Medical Tests:         Summary of old records / correspondence / consultant report:         Request outside records:         ALLERGIES  No Known Allergies     PFSH:     The following portions of the patient's history were reviewed and updated as appropriate: Allergies / Current Medications / Past Medical History / Surgical History / Social History / Family History    PROBLEM LIST   Patient Active Problem List   Diagnosis   • Colon polyp   • Internal hemorrhoids   • Migraines   • Nicotine dependence   • Anxiety disorder       PAST MEDICAL HISTORY  Past Medical History:   Diagnosis Date   • Anxiety    • Colon polyps    • Migraines        SURGICAL HISTORY  Past Surgical History:   Procedure Laterality Date   • COLONOSCOPY N/A 03/11/2015    Two 4-6 mm polyps at the recto-sigmoid and in the mid ascending colon (PATH: tubulovillous adenoma w/ low-grade dysplasia & hyperplastic colon polyp at 20 cm), internal hemorrhoids; Dr. Chato Allen   • SHOULDER SURGERY Bilateral  2/18/1965, 6/2005       SOCIAL HISTORY  Social History     Social History   • Marital status: Unknown   • Number of children: 3     Social History Main Topics   • Smoking status: Former Smoker   • Smokeless tobacco: Current User     Types: Chew      Comment: Quit smoking 2 months ago   • Alcohol use Yes      Comment: 2-3/week   • Drug use: No   • Sexual activity: Yes     Partners: Male     Other Topics Concern   • Not on file       FAMILY HISTORY  History reviewed. No pertinent family history.        **Dragon Disclaimer:   Much of this encounter note is an electronic transcription/translation of spoken language to printed text. The electronic translation of spoken language may permit erroneous, or at times, nonsensical words or phrases to be inadvertently transcribed. Although I have reviewed the note for such errors, some may still exist.

## 2018-06-14 DIAGNOSIS — Z00.00 PREVENTATIVE HEALTH CARE: Primary | ICD-10-CM

## 2018-06-20 ENCOUNTER — OFFICE VISIT (OUTPATIENT)
Dept: INTERNAL MEDICINE | Age: 43
End: 2018-06-20

## 2018-06-20 VITALS
DIASTOLIC BLOOD PRESSURE: 64 MMHG | SYSTOLIC BLOOD PRESSURE: 128 MMHG | BODY MASS INDEX: 27.85 KG/M2 | TEMPERATURE: 97.8 F | HEIGHT: 69 IN | HEART RATE: 86 BPM | OXYGEN SATURATION: 99 % | WEIGHT: 188 LBS

## 2018-06-20 DIAGNOSIS — F41.9 ANXIETY DISORDER, UNSPECIFIED TYPE: Chronic | ICD-10-CM

## 2018-06-20 DIAGNOSIS — G43.909 MIGRAINE WITHOUT STATUS MIGRAINOSUS, NOT INTRACTABLE, UNSPECIFIED MIGRAINE TYPE: ICD-10-CM

## 2018-06-20 DIAGNOSIS — Z00.00 ENCOUNTER FOR ANNUAL HEALTH EXAMINATION: Primary | ICD-10-CM

## 2018-06-20 PROCEDURE — 99396 PREV VISIT EST AGE 40-64: CPT | Performed by: INTERNAL MEDICINE

## 2018-06-20 NOTE — PROGRESS NOTES
[facility]  SEJAL THACKER M.D.      Sadiq HERNANDES Alexander / 42 y.o. / male  06/20/2018    CC: Annual Exam      HPI:      Sadiq presents for annual health maintenance visit.    · Last health maintenance visit: unsure  · General health: good  · Lifestyle:  · Attempting to lose weight?: No   · Diet: adequate/fair  · Exercise: adequate/fair  · Tobacco: uses smokeless nicotine and recently stopped smoking month ago   · Alcohol: does not drink  · Work: Full-time  · Reproductive health:  · Sexually active?: Yes   · Concern for STD?: No   · Sexual problems?: No problems   · Sees Urologist?: No   · Depression Screening:      PHQ-2/PHQ-9 Depression Screening 2/26/2018   Little interest or pleasure in doing things 0   Feeling down, depressed, or hopeless 0   Total Score 0         PHQ-2: 0 (Not depressed)     PHQ-9:     Patient Care Team:  Charly Thacker MD as PCP - General (Internal Medicine)  ______________________________________________________________________    ALLERGIES  No Known Allergies     MEDICATIONS  Current Outpatient Prescriptions   Medication Sig Dispense Refill   • escitalopram (LEXAPRO) 10 MG tablet TAKE 1 TABLET BY MOUTH DAILY. 30 tablet 2   • fluticasone (FLONASE) 50 MCG/ACT nasal spray 2 sprays into each nostril Daily.     • ZOLMitriptan (ZOMIG-ZMT) 5 MG disintegrating tablet DISSOLVE 1 TAB ON TONGUE AND SWALLOW AT ONSET OF MIGRAINE. MAY REPEAT ONCE AFTER 2H. MAX 10 MG/DAY 10 tablet 0     No current facility-administered medications for this visit.        PFSH:     The following portions of the patient's history were reviewed and updated as appropriate: Allergies / Current Medications / Past Medical History / Surgical History / Social History / Family History    PROBLEM LIST   Patient Active Problem List   Diagnosis   • Colon polyp   • Internal hemorrhoids   • Migraines   • Nicotine dependence   • Anxiety disorder       PAST MEDICAL HISTORY  Past Medical History:   Diagnosis Date   • Anxiety    • Colon polyps     • Migraines        SURGICAL HISTORY  Past Surgical History:   Procedure Laterality Date   • COLONOSCOPY N/A 03/11/2015    Two 4-6 mm polyps at the recto-sigmoid and in the mid ascending colon (PATH: tubulovillous adenoma w/ low-grade dysplasia & hyperplastic colon polyp at 20 cm), internal hemorrhoids; Dr. Chato Allen   • SHOULDER SURGERY Bilateral 2/18/1965, 6/2005       SOCIAL HISTORY  Social History     Social History   • Marital status: Single     Spouse name: Doris   • Number of children: 3     Occupational History   • Software consulting      Social History Main Topics   • Smoking status: Former Smoker     Packs/day: 0.75     Years: 15.00     Types: Cigarettes     Quit date: 4/20/2018   • Smokeless tobacco: Current User     Types: Chew      Comment: Quit smoking 2 months ago   • Alcohol use 1.2 - 1.8 oz/week     2 - 3 Standard drinks or equivalent per week   • Drug use: No   • Sexual activity: Yes     Partners: Female     Other Topics Concern   • Not on file       FAMILY HISTORY  Family History   Problem Relation Age of Onset   • Melanoma Father    • Depression Father    • No Known Problems Sister    • Parkinsonism Maternal Grandmother    • Lung cancer Maternal Grandfather         smoker   • [place]'s disease Maternal Grandfather    • Lung cancer Paternal Grandmother         smoker   • Lung cancer Paternal Aunt         smoker   • Cancer Maternal Aunt         spine   • Heart attack Paternal Uncle 55   • Colon cancer Neg Hx    • Prostate cancer Neg Hx        IMMUNIZATION HISTORY  Immunization History   Administered Date(s) Administered   • Tdap 01/18/2017       ______________________________________________________________________    REVIEW OF SYSTEMS    Review of Systems   Constitutional: Negative.    HENT: Negative.    Eyes: Negative.    Respiratory: Negative.    Cardiovascular: Negative.    Gastrointestinal: Positive for diarrhea (loose stool). Negative for abdominal pain, blood in stool and rectal  "pain.   Endocrine: Negative.    Genitourinary: Negative.    Musculoskeletal: Negative.    Skin: Negative.         Sees derm for multiple moles and family history of melanoma   Allergic/Immunologic: Negative.    Neurological: Negative.  Headaches: stable.   Hematological: Negative.    Psychiatric/Behavioral: Negative.  Nervous/anxious: stable.        VITALS:    Visit Vitals  /64   Pulse 86   Temp 97.8 °F (36.6 °C)   Ht 175.3 cm (69.02\")   Wt 85.3 kg (188 lb)   SpO2 99%   BMI 27.75 kg/m²       BP Readings from Last 3 Encounters:   06/20/18 128/64   04/24/18 118/70   02/26/18 112/68     Wt Readings from Last 3 Encounters:   06/20/18 85.3 kg (188 lb)   04/24/18 86.6 kg (191 lb)   02/26/18 87.1 kg (192 lb)      Body mass index is 27.75 kg/m².    PHYSICAL EXAMINATION    Physical Exam   Constitutional: He is oriented to person, place, and time. He appears well-nourished. No distress.   HENT:   Head: Normocephalic.   Right Ear: External ear normal.   Left Ear: External ear normal.   Nose: Nose normal.   Mouth/Throat: Oropharynx is clear and moist.   Eyes: Conjunctivae and EOM are normal. Pupils are equal, round, and reactive to light. No scleral icterus.   Neck: Normal range of motion. Neck supple. No tracheal deviation present. No thyromegaly present.   Cardiovascular: Normal rate, regular rhythm, normal heart sounds and intact distal pulses.  Exam reveals no gallop and no friction rub.    No murmur heard.  Pulmonary/Chest: Effort normal and breath sounds normal.   Abdominal: Soft. Normal appearance and bowel sounds are normal. He exhibits no distension and no mass. There is no hepatosplenomegaly. There is no tenderness. No hernia.   Musculoskeletal: He exhibits no edema or deformity.   Lymphadenopathy:     He has no cervical adenopathy.     He has no axillary adenopathy.        Right: No inguinal and no supraclavicular adenopathy present.        Left: No inguinal and no supraclavicular adenopathy present. "   Neurological: He is alert and oriented to person, place, and time. He has normal reflexes. He displays normal reflexes. No cranial nerve deficit. He exhibits normal muscle tone. Coordination normal.   Skin: Skin is intact. No lesion (Negative for suspicious skin lesions/growths) and no rash noted. No cyanosis or erythema. No pallor. Nails show no clubbing.   No jaundice  Multiple brown nevi on torso, neck (sees derm)   Psychiatric: He has a normal mood and affect. His behavior is normal. Judgment and thought content normal. Cognition and memory are normal.       REVIEWED DATA    Labs:    Lab Results   Component Value Date     02/13/2015    K 3.4 (L) 02/13/2015    AST 17 02/13/2015    ALT 19 02/13/2015    BUN 19 02/13/2015    CREATININE 1.34 (H) 02/13/2015       Lab Results   Component Value Date    GLUCOSE 70 02/13/2015       No results found for: PSA, TESTOSTEROTT    No results found for: LDL, HDL, TRIG, CHOLHDLRATIO    No components found for: WDXO993D    Lab Results   Component Value Date    WBC 7.62 02/13/2015    HGB 15.6 02/13/2015    MCV 90.6 02/13/2015     02/13/2015       No results found for: PROTEIN, GLUCOSEU, BLOODU, NITRITEU, LEUKOCYTESUR     No results found for: HEPCVIRUSABY    Imaging:         Medical Tests:       ______________________________________________________________________    ASSESSMENT & PLAN    ANNUAL WELLNESS EXAM / PHYSICAL     Other medical problems addressed today:  Problem List Items Addressed This Visit        High    Migraines (Chronic)    Overview     Stable. Continue escitalopram 10 mg daily and zolmitriptan 5 mg as needed.          Relevant Medications    ZOLMitriptan (ZOMIG-ZMT) 5 MG disintegrating tablet    escitalopram (LEXAPRO) 10 MG tablet       Medium    Anxiety disorder (Chronic)    Overview     H/o ADHD.     Stable on escitalopram 10 mg qd.          Relevant Medications    escitalopram (LEXAPRO) 10 MG tablet      Other Visit Diagnoses     Encounter for  annual health examination    -  Primary          Summary/Discussion:     Labs orders in chart.       Return in about 6 months (around 12/20/2018) for Headaches.    No future appointments.      HEALTHCARE MAINTENANCE ISSUES:    Cancer Screening:  · Colon: Initial/Next screening at age: CURRENT or DUE NOW (verify when he is due)  · Repeat colonoscopy every 5 years  · Prostate: PSA checked annually but defer AMOS until 50  · Testicular: Recommended monthly self exam  · Skin: Monthly self skin examination, annual exam by health professional  · Lung:   · Other:    Screening Labs & Tests:  · Lab results reviewed & discussed with with patient or orders placed today.  · EKG:  · Vascular Screening:   · DEXA (75+ or risk factors):   · HEP C (If born [birth year]206-0037 or risk factors): Not indicated    Immunization/Vaccinations (to be given today unless deferred by patient)  · Tetanus/Pertussis: Up to date  · Pneumovax: Not needed at this time  · Prevnar 13: Not needed at this time  · Shingles: Not needed at this time  · Influenza: Patient deferred/declined flu vaccine  · Hepatitis A: Up to date  · Other:     Lifestyle Counseling:  · Lifestyle Modifications: Follow a low fat, low cholesterol diet, Quit chewin tobacco, Continue to abstain/curtail drinking and Continue to abstain from smoking  · Safety Issues: Always wear seatbelt, Avoid texting while driving   · Use sunscreen, regular skin examination  · Recommended annual dental/vision examination.  · Emotional/Stress/Sleep: Reviewed and  given when appropriate      Health Maintenance   Topic Date Due   • COLONOSCOPY  06/20/2018   • INFLUENZA VACCINE  08/01/2018   • ANNUAL PHYSICAL  06/21/2019   • TDAP/TD VACCINES (2 - Td) 01/19/2027         **Sabrina Disclaimer:   Much of this encounter note is an electronic transcription/translation of spoken language to printed text. The electronic translation of spoken language may permit erroneous, or at times, nonsensical words or phrases  to be inadvertently transcribed. Although I have reviewed the note for such errors, some may still exist.

## 2018-06-21 ENCOUNTER — TELEPHONE (OUTPATIENT)
Dept: INTERNAL MEDICINE | Age: 43
End: 2018-06-21

## 2018-06-21 DIAGNOSIS — E78.5 HYPERLIPIDEMIA, UNSPECIFIED HYPERLIPIDEMIA TYPE: Primary | ICD-10-CM

## 2018-06-21 LAB
25(OH)D3+25(OH)D2 SERPL-MCNC: 36.5 NG/ML (ref 30–100)
ALBUMIN SERPL-MCNC: 5 G/DL (ref 3.5–5.2)
ALBUMIN/GLOB SERPL: 2.2 G/DL
ALP SERPL-CCNC: 85 U/L (ref 39–117)
ALT SERPL-CCNC: 16 U/L (ref 1–41)
APPEARANCE UR: CLEAR
AST SERPL-CCNC: 18 U/L (ref 1–40)
BASOPHILS # BLD AUTO: 0.02 10*3/MM3 (ref 0–0.2)
BASOPHILS NFR BLD AUTO: 0.3 % (ref 0–1.5)
BILIRUB SERPL-MCNC: 0.4 MG/DL (ref 0.1–1.2)
BILIRUB UR QL STRIP: NEGATIVE
BUN SERPL-MCNC: 13 MG/DL (ref 6–20)
BUN/CREAT SERPL: 10.6 (ref 7–25)
CALCIUM SERPL-MCNC: 10.4 MG/DL (ref 8.6–10.5)
CHLORIDE SERPL-SCNC: 100 MMOL/L (ref 98–107)
CHOLEST SERPL-MCNC: 258 MG/DL (ref 0–200)
CHOLEST/HDLC SERPL: 6.14 {RATIO}
CO2 SERPL-SCNC: 27.5 MMOL/L (ref 22–29)
COLOR UR: YELLOW
CREAT SERPL-MCNC: 1.23 MG/DL (ref 0.76–1.27)
EOSINOPHIL # BLD AUTO: 0.15 10*3/MM3 (ref 0–0.7)
EOSINOPHIL NFR BLD AUTO: 2 % (ref 0.3–6.2)
ERYTHROCYTE [DISTWIDTH] IN BLOOD BY AUTOMATED COUNT: 13.2 % (ref 11.5–14.5)
GFR SERPLBLD CREATININE-BSD FMLA CKD-EPI: 65 ML/MIN/1.73
GFR SERPLBLD CREATININE-BSD FMLA CKD-EPI: 79 ML/MIN/1.73
GLOBULIN SER CALC-MCNC: 2.3 GM/DL
GLUCOSE SERPL-MCNC: 101 MG/DL (ref 65–99)
GLUCOSE UR QL: NEGATIVE
HBA1C MFR BLD: 5.51 % (ref 4.8–5.6)
HCT VFR BLD AUTO: 44.6 % (ref 40.4–52.2)
HDLC SERPL-MCNC: 42 MG/DL (ref 40–60)
HGB BLD-MCNC: 15.3 G/DL (ref 13.7–17.6)
HGB UR QL STRIP: NEGATIVE
IMM GRANULOCYTES # BLD: 0.01 10*3/MM3 (ref 0–0.03)
IMM GRANULOCYTES NFR BLD: 0.1 % (ref 0–0.5)
KETONES UR QL STRIP: NEGATIVE
LDLC SERPL CALC-MCNC: 174 MG/DL (ref 0–100)
LEUKOCYTE ESTERASE UR QL STRIP: NEGATIVE
LYMPHOCYTES # BLD AUTO: 2.53 10*3/MM3 (ref 0.9–4.8)
LYMPHOCYTES NFR BLD AUTO: 34.4 % (ref 19.6–45.3)
MCH RBC QN AUTO: 31.3 PG (ref 27–32.7)
MCHC RBC AUTO-ENTMCNC: 34.3 G/DL (ref 32.6–36.4)
MCV RBC AUTO: 91.2 FL (ref 79.8–96.2)
MONOCYTES # BLD AUTO: 0.42 10*3/MM3 (ref 0.2–1.2)
MONOCYTES NFR BLD AUTO: 5.7 % (ref 5–12)
NEUTROPHILS # BLD AUTO: 4.23 10*3/MM3 (ref 1.9–8.1)
NEUTROPHILS NFR BLD AUTO: 57.6 % (ref 42.7–76)
NITRITE UR QL STRIP: NEGATIVE
PH UR STRIP: 6 [PH] (ref 5–8)
PLATELET # BLD AUTO: 235 10*3/MM3 (ref 140–500)
POTASSIUM SERPL-SCNC: 4.8 MMOL/L (ref 3.5–5.2)
PROT SERPL-MCNC: 7.3 G/DL (ref 6–8.5)
PROT UR QL STRIP: NEGATIVE
PSA SERPL-MCNC: 1.6 NG/ML (ref 0–4)
RBC # BLD AUTO: 4.89 10*6/MM3 (ref 4.6–6)
SODIUM SERPL-SCNC: 141 MMOL/L (ref 136–145)
SP GR UR: 1.01 (ref 1–1.03)
T4 FREE SERPL-MCNC: 1.1 NG/DL (ref 0.93–1.7)
TESTOST SERPL-MCNC: 434 NG/DL (ref 264–916)
TRIGL SERPL-MCNC: 208 MG/DL (ref 0–150)
TSH SERPL DL<=0.005 MIU/L-ACNC: 0.87 MIU/ML (ref 0.27–4.2)
UROBILINOGEN UR STRIP-MCNC: NORMAL MG/DL
VLDLC SERPL CALC-MCNC: 41.6 MG/DL (ref 5–40)
WBC # BLD AUTO: 7.35 10*3/MM3 (ref 4.5–10.7)

## 2018-06-21 RX ORDER — ROSUVASTATIN CALCIUM 10 MG/1
10 TABLET, COATED ORAL DAILY
Qty: 90 TABLET | Refills: 0 | Status: SHIPPED | OUTPATIENT
Start: 2018-06-21 | End: 2018-09-27 | Stop reason: SDUPTHER

## 2018-06-21 NOTE — TELEPHONE ENCOUNTER
Pt informed of lab results and new medication will start rosuvastatin and recheck labs in 3 months. Per Dr Renetta DE JESUS

## 2018-06-21 NOTE — ASSESSMENT & PLAN NOTE
Pt to start rosuvastatin 10 mg tabs daily and recheck lipids and LFT's in 3 months per Dr Jackson. LIZA

## 2018-06-21 NOTE — PROGRESS NOTES
Call patient with his test result(s) and mail the results to him if MyChart is NOT active.    Cholesterol level is quite high. I would recommend starting rosuvastatin 10 mg qHS. Recheck lipids and LFTs in 3 months.   (REMINDER: Update med list, maintain dx association, and update change on CURRENT ASSESSMENT/PLAN)     All other labs were fine.

## 2018-06-21 NOTE — TELEPHONE ENCOUNTER
----- Message from Charly Jackson MD sent at 6/21/2018  9:42 AM EDT -----  Call patient with his test result(s) and mail the results to him if MyChart is NOT active.    Cholesterol level is quite high. I would recommend starting rosuvastatin 10 mg qHS. Recheck lipids and LFTs in 3 months.   (REMINDER: Update med list, maintain dx association, and update change on CURRENT ASSESSMENT/PLAN)     All other labs were fine.

## 2018-07-28 DIAGNOSIS — G43.909 MIGRAINE WITHOUT STATUS MIGRAINOSUS, NOT INTRACTABLE, UNSPECIFIED MIGRAINE TYPE: ICD-10-CM

## 2018-07-28 DIAGNOSIS — F41.9 ANXIETY DISORDER, UNSPECIFIED TYPE: ICD-10-CM

## 2018-07-30 RX ORDER — ESCITALOPRAM OXALATE 10 MG/1
10 TABLET ORAL DAILY
Qty: 30 TABLET | Refills: 2 | Status: SHIPPED | OUTPATIENT
Start: 2018-07-30 | End: 2018-10-25 | Stop reason: SDUPTHER

## 2018-08-04 ENCOUNTER — HOSPITAL ENCOUNTER (EMERGENCY)
Facility: HOSPITAL | Age: 43
Discharge: HOME | End: 2018-08-04
Attending: EMERGENCY MEDICINE | Admitting: EMERGENCY MEDICINE

## 2018-08-04 VITALS
SYSTOLIC BLOOD PRESSURE: 108 MMHG | OXYGEN SATURATION: 98 % | HEART RATE: 76 BPM | WEIGHT: 185 LBS | TEMPERATURE: 98.7 F | BODY MASS INDEX: 27.4 KG/M2 | HEIGHT: 69 IN | DIASTOLIC BLOOD PRESSURE: 72 MMHG | RESPIRATION RATE: 18 BRPM

## 2018-08-04 DIAGNOSIS — S39.012A STRAIN OF LUMBAR REGION, INITIAL ENCOUNTER: Primary | ICD-10-CM

## 2018-08-04 PROCEDURE — 96372 THER/PROPH/DIAG INJ SC/IM: CPT

## 2018-08-04 PROCEDURE — 99283 EMERGENCY DEPT VISIT LOW MDM: CPT

## 2018-08-04 PROCEDURE — 25010000002 HYDROMORPHONE PER 4 MG: Performed by: NURSE PRACTITIONER

## 2018-08-04 RX ORDER — HYDROMORPHONE HYDROCHLORIDE 1 MG/ML
1 INJECTION, SOLUTION INTRAMUSCULAR; INTRAVENOUS; SUBCUTANEOUS ONCE
Status: COMPLETED | OUTPATIENT
Start: 2018-08-04 | End: 2018-08-04

## 2018-08-04 RX ORDER — HYDROCODONE BITARTRATE AND ACETAMINOPHEN 5; 325 MG/1; MG/1
1 TABLET ORAL EVERY 4 HOURS PRN
Qty: 12 TABLET | Refills: 0 | Status: SHIPPED | OUTPATIENT
Start: 2018-08-04 | End: 2018-10-24

## 2018-08-04 RX ORDER — DIAZEPAM 5 MG/1
5 TABLET ORAL ONCE
Status: COMPLETED | OUTPATIENT
Start: 2018-08-04 | End: 2018-08-04

## 2018-08-04 RX ORDER — BACLOFEN 10 MG/1
10 TABLET ORAL 3 TIMES DAILY PRN
Qty: 21 TABLET | Refills: 0 | Status: SHIPPED | OUTPATIENT
Start: 2018-08-04 | End: 2018-08-10 | Stop reason: SDUPTHER

## 2018-08-04 RX ADMIN — DIAZEPAM 5 MG: 5 TABLET ORAL at 17:01

## 2018-08-04 RX ADMIN — HYDROMORPHONE HYDROCHLORIDE 1 MG: 1 INJECTION, SOLUTION INTRAMUSCULAR; INTRAVENOUS; SUBCUTANEOUS at 17:04

## 2018-08-04 NOTE — ED TRIAGE NOTES
Pt reports lifting something heavy now reports pain in lower back. Hx of back problems per patient

## 2018-08-04 NOTE — ED PROVIDER NOTES
Pt presents to ED c/o lower back pain, with R greater than L that is exacerbated by inhalation since earlier today when pt states he strained his back while trying to  an outside door. Pt denies urinary or fecal incontinence, numbness, weakness, abd pain or chest pain.    Upon exam, pt is A&O x3, mild discomfort upon movement,   heart is RRR with no murmurs, lungs are CTAB,   Abdomen nonTTPalp  pt has normal sensation and reflexes, normal strength, negative straight leg raise bilat lower ext, and thoracic and lumbar spine are non tender to palpation in midline.     Discussed plan to discharge. Pt should use heat and gentle stretching to alleviate pain. Pt should be consistent with his treatment for 3-4 days. Pt should f/u with PCP. Pt understands and agrees with the plan, all questions answered.    MD ATTESTATION NOTE    The MARCELLO and I have discussed this patient's history, physical exam, and treatment plan.  I have reviewed the documentation and personally had a face to face interaction with the patient. I affirm the documentation and agree with the treatment and plan.  The attached note describes my personal findings.    Documentation assistance provided by darline Campos for Dr. Cazares.  Information recorded by the scrronal was done at my direction and has been verified and validated by me.       Oralia Campos  08/04/18 [id number]       Prabha Cazares MD  08/06/18 1010

## 2018-08-04 NOTE — DISCHARGE INSTRUCTIONS
Medications as ordered  Heat to back for 24 hours, then use heat or ice  Gentle stretching  Activity as tolerated  Return to er for numbness/tingling to legs, loss of bowel/bladder function, increased pain or any new or worsening symptoms

## 2018-08-04 NOTE — ED PROVIDER NOTES
EMERGENCY DEPARTMENT ENCOUNTER    CHIEF COMPLAINT  Chief Complaint: low back pain  History given by: patient  History limited by: nothing  Room Number:   PMD: Charly Jackson MD      HPI:  Pt is a 43 y.o. male who presents with c/o low back pain.  Patient states that he picked up an outside door and strained his back.  Patient denies numbness/tingling to legs, loss of bowel/bladder function.    Past Medical History of Migraines, Anxiety    Duration: constant  Timin hours ago  Location: low back  Radiation: none  Quality: muscle spasms  Intensity/Severity:moderate  Progression: worsening  Associated Symptoms: none  Aggravating Factors: movement  Alleviating Factors: nothing  Previous Episodes: yes  Treatment before arrival: muscle relaxers    PAST MEDICAL HISTORY  Active Ambulatory Problems     Diagnosis Date Noted   • Colon polyp 2016   • Internal hemorrhoids 2016   • Migraines 2016   • Nicotine dependence 2016   • Anxiety disorder 2018   • Hyperlipidemia 2018     Resolved Ambulatory Problems     Diagnosis Date Noted   • No Resolved Ambulatory Problems     Past Medical History:   Diagnosis Date   • Anxiety    • Colon polyps    • Migraines        PAST SURGICAL HISTORY  Past Surgical History:   Procedure Laterality Date   • COLONOSCOPY N/A 2015    Two 4-6 mm polyps at the recto-sigmoid and in the mid ascending colon (PATH: tubulovillous adenoma w/ low-grade dysplasia & hyperplastic colon polyp at 20 cm), internal hemorrhoids; Dr. Chato Allen   • SHOULDER SURGERY Bilateral 1965, 2005       FAMILY HISTORY  Family History   Problem Relation Age of Onset   • Melanoma Father    • Depression Father    • No Known Problems Sister    • Parkinsonism Maternal Grandmother    • Lung cancer Maternal Grandfather         smoker   • [place]'s disease Maternal Grandfather    • Lung cancer Paternal Grandmother         smoker   • Lung cancer Paternal Aunt         smoker   •  Cancer Maternal Aunt         spine   • Heart attack Paternal Uncle 55   • Colon cancer Neg Hx    • Prostate cancer Neg Hx        SOCIAL HISTORY  Social History     Social History   • Marital status: Single     Spouse name: Doris   • Number of children: 3   • Years of education: N/A     Occupational History   • Software consulting      Social History Main Topics   • Smoking status: Former Smoker     Packs/day: 0.75     Years: 15.00     Types: Cigarettes     Quit date: 4/20/2018   • Smokeless tobacco: Current User     Types: Chew      Comment: Quit smoking 2 months ago   • Alcohol use 1.2 - 1.8 oz/week     2 - 3 Standard drinks or equivalent per week   • Drug use: No   • Sexual activity: Yes     Partners: Female     Other Topics Concern   • Not on file     Social History Narrative   • No narrative on file         ALLERGIES  Patient has no known allergies.    REVIEW OF SYSTEMS  Review of Systems   Constitutional: Negative for chills and fever.   HENT: Negative for sore throat.    Gastrointestinal: Negative for nausea and vomiting.   Genitourinary: Negative for dysuria.   Musculoskeletal: Positive for back pain.   Skin: Negative for rash.   Psychiatric/Behavioral: The patient is not nervous/anxious.        PHYSICAL EXAM  ED Triage Vitals   Temp Heart Rate Resp BP SpO2   08/04/18 1635 08/04/18 1636 08/04/18 1635 08/04/18 1703 08/04/18 1636   98.5 °F (36.9 °C) 88 18 122/86 96 %           Physical Exam   Constitutional: He is well-developed, well-nourished, and in no distress. No distress.   HENT:   Head: Atraumatic.   Mouth/Throat: Mucous membranes are normal.   Eyes: No scleral icterus.   Neck: Normal range of motion.   Cardiovascular: Normal rate, regular rhythm and normal heart sounds.    Pulses:       Dorsalis pedis pulses are 2+ on the right side, and 2+ on the left side.        Posterior tibial pulses are 2+ on the right side, and 2+ on the left side.   Pulmonary/Chest: Effort normal and breath sounds normal.    Musculoskeletal: Normal range of motion.        Cervical back: He exhibits no tenderness.        Thoracic back: He exhibits no tenderness.        Lumbar back: He exhibits tenderness.        Back:    Neurological: He is alert. He has normal sensation and normal strength.   Skin: Skin is warm and dry.   Psychiatric: Mood and affect normal.   Nursing note and vitals reviewed.          PROGRESS AND CONSULTS    1710: Reviewed pt's history and workup with Dr. Cazares.  At bedside evaluation, they agree with the plan of care.    1800:  Patient states that pain is improved after medications.  Reviewed implications of results, diagnosis, meds, responsibility to follow up, warning signs and symptoms of possible worsening, potential complications and reasons to return to ER with patient.  Discussed all results and noted any abnormalities with patient.  Discussed absolute need to recheck abnormalities with PMD    Discussed plan for discharge, as there is no emergent indication for admission.  Pt is agreeable and understands need for follow up and repeat testing.  Pt is aware that discharge does not mean that nothing is wrong but it indicates no emergency is present.  Pt is discharged with instructions to follow up with primary care doctor to have their blood pressure rechecked.       DIAGNOSIS  Final diagnoses:   Strain of lumbar region, initial encounter       FOLLOW UP   Charly Jackson MD  [address] [address]  [phone]    Schedule an appointment as soon as possible for a visit   As needed      RX     Medication List      New Prescriptions    baclofen 10 MG tablet  Commonly known as:  LIORESAL  Take 1 tablet by mouth 3 (Three) Times a Day As Needed for Muscle Spasms.     HYDROcodone-acetaminophen 5-325 MG per tablet  Commonly known as:  NORCO  Take 1 tablet by mouth Every 4 (Four) Hours As Needed for Moderate Pain .          Brant report [id number]  reviewed.  Risks, benefits, alternatives discussed  "with patient.  Pt consents to treatment and agrees to follow up with PMD tomorrow for further care and any other prescriptions.                 COURSE & MEDICAL DECISION MAKING  Pertinent  Imaging studies that were ordered and reviewed are noted above.  Results were reviewed/discussed with the patient and they were also made aware of online assess.       MEDICATIONS GIVEN IN ER  Medications   HYDROmorphone (DILAUDID) injection 1 mg (not administered)   diazePAM (VALIUM) tablet 5 mg (not administered)       Pulse 88   Temp 98.5 °F (36.9 °C) (Tympanic)   Resp 16   Ht 175.3 cm (69\")   SpO2 96%         Written by FARRAH Valladares on 8/4/2018 at 4:47 PM.      Dena Bernardo, APRN  08/04/18 1803    "

## 2018-08-06 ENCOUNTER — TELEPHONE (OUTPATIENT)
Dept: SOCIAL WORK | Facility: HOSPITAL | Age: 43
End: 2018-08-06

## 2018-08-06 ENCOUNTER — TELEPHONE (OUTPATIENT)
Dept: INTERNAL MEDICINE | Age: 43
End: 2018-08-06

## 2018-08-06 NOTE — TELEPHONE ENCOUNTER
F/u phone call; spoke w./patient who reports taking RX as prescribed w/some relief. He has also sent a message via my chart to PCP for an appt. Awaiting return message from that. Alejandrina Hua RN

## 2018-08-06 NOTE — TELEPHONE ENCOUNTER
----- Message from Sadiq Alexander sent at 8/6/2018  6:30 AM EDT -----  Regarding: Non-Urgent Medical Question  Contact: [phone]  Hebarbara Jackson.     I visited the ER Saturday because I strained my back. I'm taking muscle relaxers and pain meds as directed. It's getting better but very slowly. Still a lot of pain and some pretty sharp spasms.     I was told to follow up with you and wanted to check to see if I should make an appointment.     Thanks     Sadiq Alexander

## 2018-08-10 ENCOUNTER — OFFICE VISIT (OUTPATIENT)
Dept: INTERNAL MEDICINE | Age: 43
End: 2018-08-10

## 2018-08-10 VITALS
HEIGHT: 69 IN | BODY MASS INDEX: 27.7 KG/M2 | HEART RATE: 69 BPM | OXYGEN SATURATION: 98 % | WEIGHT: 187 LBS | SYSTOLIC BLOOD PRESSURE: 100 MMHG | TEMPERATURE: 98.1 F | DIASTOLIC BLOOD PRESSURE: 62 MMHG

## 2018-08-10 DIAGNOSIS — S39.012D STRAIN OF LUMBAR REGION, SUBSEQUENT ENCOUNTER: Primary | ICD-10-CM

## 2018-08-10 PROCEDURE — 99213 OFFICE O/P EST LOW 20 MIN: CPT | Performed by: INTERNAL MEDICINE

## 2018-08-10 RX ORDER — BACLOFEN 10 MG/1
10 TABLET ORAL 3 TIMES DAILY PRN
Qty: 21 TABLET | Refills: 0 | Status: SHIPPED | OUTPATIENT
Start: 2018-08-10 | End: 2018-08-10 | Stop reason: SDUPTHER

## 2018-08-10 RX ORDER — BACLOFEN 10 MG/1
10 TABLET ORAL 3 TIMES DAILY PRN
Qty: 21 TABLET | Refills: 0 | Status: SHIPPED | OUTPATIENT
Start: 2018-08-10 | End: 2018-12-10

## 2018-08-10 RX ORDER — TRAMADOL HYDROCHLORIDE 50 MG/1
50 TABLET ORAL EVERY 6 HOURS PRN
Qty: 28 TABLET | Refills: 0 | OUTPATIENT
Start: 2018-08-10 | End: 2018-08-17

## 2018-08-10 NOTE — PROGRESS NOTES
"[facility] INTERNAL MEDICINE  SEJAL THACKER M.D.      Sadiq HERNANDES Alexander / 43 y.o. / male  08/10/2018      ASSESSMENT & PLAN:    1. Strain of lumbar region, subsequent encounter      No orders of the defined types were placed in this encounter.    New Medications Ordered This Visit   Medications   • baclofen (LIORESAL) 10 MG tablet     Sig: Take 1 tablet by mouth 3 (Three) Times a Day As Needed for Muscle Spasms.     Dispense:  21 tablet     Refill:  0   • traMADol (ULTRAM) 50 MG tablet     Sig: Take 1 tablet by mouth Every 6 (Six) Hours As Needed for Moderate Pain  for up to 7 days.     Dispense:  28 tablet     Refill:  0        Summary/Discussion:  Refill baclofen  Call in Rx for tramadol 50 mg q6 prn pain #28, no refill. Consent / agreement signed. Brant requested.     No Follow-up on file.    ____________________________________________________________________    MEDICATIONS  Current Outpatient Prescriptions   Medication Sig Dispense Refill   • baclofen (LIORESAL) 10 MG tablet Take 1 tablet by mouth 3 (Three) Times a Day As Needed for Muscle Spasms. 21 tablet 0   • escitalopram (LEXAPRO) 10 MG tablet TAKE 1 TABLET BY MOUTH DAILY. 30 tablet 2   • fluticasone (FLONASE) 50 MCG/ACT nasal spray 2 sprays into each nostril Daily.     • HYDROcodone-acetaminophen (NORCO) 5-325 MG per tablet Take 1 tablet by mouth Every 4 (Four) Hours As Needed for Moderate Pain . 12 tablet 0   • rosuvastatin (CRESTOR) 10 MG tablet Take 1 tablet by mouth Daily. 90 tablet 0   • ZOLMitriptan (ZOMIG-ZMT) 5 MG disintegrating tablet DISSOLVE 1 TAB ON TONGUE AND SWALLOW AT ONSET OF MIGRAINE. MAY REPEAT ONCE AFTER 2H. MAX 10 MG/DAY 10 tablet 0       VITALS    Visit Vitals  /62 (BP Location: Left arm, Patient Position: Sitting, Cuff Size: Adult)   Pulse 69   Temp 98.1 °F (36.7 °C) (Temporal Artery )   Ht 175.3 cm (69\")   Wt 84.8 kg (187 lb)   SpO2 98%   BMI 27.62 kg/m²       BP Readings from Last 3 Encounters:   08/10/18 100/62   08/04/18 108/72 "   06/20/18 128/64     Wt Readings from Last 3 Encounters:   08/10/18 84.8 kg (187 lb)   08/04/18 83.9 kg (185 lb)   06/20/18 85.3 kg (188 lb)      Body mass index is 27.62 kg/m².      CC:  Main reason(s) for today's visit: Back Pain      HPI:     Seen in ED 8/4 for acute back strain. Taking ibuprofen, baclofen and hydrocodone/APAP 5/325 as needed. Overall back pain is improving. No sciatic symptoms. Denies problems with medications. Currently pain is moderate in severity.     Patient Care Team:  Charly Jackson MD as PCP - General (Internal Medicine)  ____________________________________________________________________      REVIEW OF SYSTEMS    Review of Systems  Constitutional neg  Resp neg  CV neg   GI neg    PHYSICAL EXAMINATION    Physical Exam   Constitutional: No distress.   Musculoskeletal:        Thoracic back: Normal.        Lumbar back: He exhibits tenderness, pain and spasm (right paraspinal muscle).     Alert, nomral thought/judgment      REVIEWED DATA:    Labs:     Imaging:        Medical Tests:        Summary of old records / correspondence / consultant report:   ED note regarding back strain, medications     Request outside records:       ALLERGIES  No Known Allergies     PFSH:     The following portions of the patient's history were reviewed and updated as appropriate: Allergies / Current Medications / Past Medical History / Surgical History / Social History / Family History    PROBLEM LIST   Patient Active Problem List   Diagnosis   • Colon polyp   • Internal hemorrhoids   • Migraines   • Nicotine dependence   • Anxiety disorder   • Hyperlipidemia       PAST MEDICAL HISTORY  Past Medical History:   Diagnosis Date   • Anxiety    • Colon polyps    • Migraines        SURGICAL HISTORY  Past Surgical History:   Procedure Laterality Date   • COLONOSCOPY N/A 03/11/2015    Two 4-6 mm polyps at the recto-sigmoid and in the mid ascending colon (PATH: tubulovillous adenoma w/ low-grade dysplasia & hyperplastic  colon polyp at 20 cm), internal hemorrhoids; Dr. Chato Allen   • SHOULDER SURGERY Bilateral 2/18/1965, 6/2005       SOCIAL HISTORY  Social History     Social History   • Marital status: Single     Spouse name: Doris   • Number of children: 3     Occupational History   • Software consulting      Social History Main Topics   • Smoking status: Former Smoker     Packs/day: 0.75     Years: 15.00     Types: Cigarettes     Quit date: 4/20/2018   • Smokeless tobacco: Current User     Types: Chew      Comment: Quit smoking 2 months ago   • Alcohol use 1.2 - 1.8 oz/week     2 - 3 Standard drinks or equivalent per week   • Drug use: No   • Sexual activity: Yes     Partners: Female     Other Topics Concern   • Not on file       FAMILY HISTORY  Family History   Problem Relation Age of Onset   • Melanoma Father    • Depression Father    • No Known Problems Sister    • Parkinsonism Maternal Grandmother    • Lung cancer Maternal Grandfather         smoker   • Cibola's disease Maternal Grandfather    • Lung cancer Paternal Grandmother         smoker   • Lung cancer Paternal Aunt         smoker   • Cancer Maternal Aunt         spine   • Heart attack Paternal Uncle 55   • Colon cancer Neg Hx    • Prostate cancer Neg Hx          **Dragon Disclaimer:   Much of this encounter note is an electronic transcription/translation of spoken language to printed text. The electronic translation of spoken language may permit erroneous, or at times, nonsensical words or phrases to be inadvertently transcribed. Although I have reviewed the note for such errors, some may still exist.

## 2018-08-20 ENCOUNTER — PREP FOR SURGERY (OUTPATIENT)
Dept: OTHER | Facility: HOSPITAL | Age: 43
End: 2018-08-20

## 2018-08-20 ENCOUNTER — TELEPHONE (OUTPATIENT)
Dept: INTERNAL MEDICINE | Age: 43
End: 2018-08-20

## 2018-08-20 DIAGNOSIS — Z86.010 HX OF ADENOMATOUS COLONIC POLYPS: Primary | ICD-10-CM

## 2018-08-20 DIAGNOSIS — R19.7 DIARRHEA, UNSPECIFIED TYPE: ICD-10-CM

## 2018-08-20 DIAGNOSIS — R10.9 ABDOMINAL CRAMPING: ICD-10-CM

## 2018-08-20 DIAGNOSIS — K62.5 RECTAL BLEEDING: ICD-10-CM

## 2018-08-20 NOTE — TELEPHONE ENCOUNTER
----- Message from Brina Hardy MA sent at 8/20/2018  6:47 AM EDT -----  Regarding: FW: Prescription Question  Contact: [phone]      ----- Message -----  From: Sadiq Alexander  Sent: 8/19/2018  10:43 PM  To: [facility] L[id number] Pool  Subject: Prescription Question                            I'm just about out of the baclofen and still having a lot of spasms throughout the day and night. Is it possible to get a refill?

## 2018-09-13 ENCOUNTER — OUTSIDE FACILITY SERVICE (OUTPATIENT)
Dept: GASTROENTEROLOGY | Facility: CLINIC | Age: 43
End: 2018-09-13

## 2018-09-13 ENCOUNTER — TELEPHONE (OUTPATIENT)
Dept: INTERNAL MEDICINE | Age: 43
End: 2018-09-13

## 2018-09-13 DIAGNOSIS — G43.909 MIGRAINE WITHOUT STATUS MIGRAINOSUS, NOT INTRACTABLE, UNSPECIFIED MIGRAINE TYPE: ICD-10-CM

## 2018-09-13 PROCEDURE — 45380 COLONOSCOPY AND BIOPSY: CPT | Performed by: INTERNAL MEDICINE

## 2018-09-13 RX ORDER — ZOLMITRIPTAN 5 MG/1
5 TABLET, ORALLY DISINTEGRATING ORAL 2 TIMES DAILY PRN
Qty: 10 TABLET | Refills: 0 | Status: SHIPPED | OUTPATIENT
Start: 2018-09-13 | End: 2019-04-13 | Stop reason: SDUPTHER

## 2018-09-13 NOTE — TELEPHONE ENCOUNTER
Pt stated late Friday [facility] pharmacy faxed request for the Zolmitriptan   Pt requesting refill   Pt's # [phone]  Thanks SP

## 2018-09-19 ENCOUNTER — TELEPHONE (OUTPATIENT)
Dept: GASTROENTEROLOGY | Facility: CLINIC | Age: 43
End: 2018-09-19

## 2018-09-19 NOTE — TELEPHONE ENCOUNTER
----- Message from Chato WISE MD sent at 9/19/2018 12:39 PM EDT -----  Regarding: Biopsy results  Okay to call results, essentially normal colon.  If diarrhea persist can follow-up in office to discuss small bowel workup.  ----- Message -----  From: Interface, Scans Incoming  Sent: 9/19/2018  11:41 AM  To: Chato WISE MD

## 2018-09-19 NOTE — TELEPHONE ENCOUNTER
Called pt and advised per Dr Allen that his bx were normal.  Advised if diarrhea persists can f/u in office to discuss sm bowel workup.     Pt verb understanding and reports he is still having diarrhea.  Advised would send message to manager for appt.

## 2018-09-20 DIAGNOSIS — E78.5 HYPERLIPIDEMIA, UNSPECIFIED HYPERLIPIDEMIA TYPE: Primary | ICD-10-CM

## 2018-09-24 LAB
ALBUMIN SERPL-MCNC: 4.9 G/DL (ref 3.5–5.5)
ALP SERPL-CCNC: 89 IU/L (ref 39–117)
ALT SERPL-CCNC: 33 IU/L (ref 0–44)
AST SERPL-CCNC: 24 IU/L (ref 0–40)
BILIRUB DIRECT SERPL-MCNC: 0.09 MG/DL (ref 0–0.4)
BILIRUB SERPL-MCNC: 0.2 MG/DL (ref 0–1.2)
CHOLEST SERPL-MCNC: 141 MG/DL (ref 100–199)
CHOLEST/HDLC SERPL: 4 RATIO (ref 0–5)
HDLC SERPL-MCNC: 35 MG/DL
LDLC SERPL CALC-MCNC: 86 MG/DL (ref 0–99)
PROT SERPL-MCNC: 6.8 G/DL (ref 6–8.5)
TRIGL SERPL-MCNC: 102 MG/DL (ref 0–149)
VLDLC SERPL CALC-MCNC: 20 MG/DL (ref 5–40)

## 2018-09-25 ENCOUNTER — TELEPHONE (OUTPATIENT)
Dept: INTERNAL MEDICINE | Age: 43
End: 2018-09-25

## 2018-09-25 NOTE — TELEPHONE ENCOUNTER
----- Message from Charly Jackson MD sent at 9/25/2018  7:47 AM EDT -----  Send result letter to patient.     Sadiq, these are your recent lab results:    Cholesterol has improved significantly. Continue same.     Please call my office if you have any questions. Otherwise, we can discuss the results in further detail on your next visit.

## 2018-09-27 DIAGNOSIS — E78.5 HYPERLIPIDEMIA, UNSPECIFIED HYPERLIPIDEMIA TYPE: ICD-10-CM

## 2018-09-27 RX ORDER — ROSUVASTATIN CALCIUM 10 MG/1
TABLET, COATED ORAL
Qty: 90 TABLET | Refills: 0 | Status: SHIPPED | OUTPATIENT
Start: 2018-09-27 | End: 2018-12-21 | Stop reason: SDUPTHER

## 2018-10-04 ENCOUNTER — TELEPHONE (OUTPATIENT)
Dept: GASTROENTEROLOGY | Facility: CLINIC | Age: 43
End: 2018-10-04

## 2018-10-04 RX ORDER — DICYCLOMINE HCL 20 MG
20 TABLET ORAL EVERY 6 HOURS
Qty: 40 TABLET | Refills: 2 | Status: SHIPPED | OUTPATIENT
Start: 2018-10-04 | End: 2018-10-24

## 2018-10-04 NOTE — TELEPHONE ENCOUNTER
Called pt and advised per Dr Allen that we can try a course of dicyclomine 20mg every 6hrs prn spasms #40 with 2 refills.  ADvsied if no improvement we will try to work him in sooner. Pt verb understanding.

## 2018-10-04 NOTE — TELEPHONE ENCOUNTER
not feelin well   Received: Today   Message Contents   Joseph Savage CORAZON  [facility] Pool   Phone Number: [phone]             Having some stomach issues, cramping on right side since colonoscopy      Called pt and pt reports that since his c/s he has been having a lot of cramping on his right side, some to the point double over.  He is also his bowels are going from constipation and diarrhea. Pt denies a fever and chills, but does report some nausea.    Pt is currently waiting on appt with Dr Allen.   Advised would send message to Dr Allen.

## 2018-10-04 NOTE — TELEPHONE ENCOUNTER
Can try a course of dicyclomine 20 mg every 6 hours as needed for spasms  #40  Refill ×2  If no improvement we'll try to work and with myself or nurse practitioner sooner

## 2018-10-04 NOTE — TELEPHONE ENCOUNTER
----- Message from Sadiq Alexander sent at 10/4/2018  8:58 AM EDT -----  Regarding: Visit Follow-Up Question  Contact: [phone]  I'm having a lot more cramping on right side, bloated and gassy, I'm going back and forth with either constipation or diarrhea since colonoscopy. I've called my regular doctor and they advised me to follow up with you but I haven't been able to get an appointment less than a month out.

## 2018-10-08 ENCOUNTER — OFFICE VISIT (OUTPATIENT)
Dept: GASTROENTEROLOGY | Facility: CLINIC | Age: 43
End: 2018-10-08

## 2018-10-08 ENCOUNTER — TELEPHONE (OUTPATIENT)
Dept: GASTROENTEROLOGY | Facility: CLINIC | Age: 43
End: 2018-10-08

## 2018-10-08 VITALS
DIASTOLIC BLOOD PRESSURE: 70 MMHG | HEIGHT: 69 IN | TEMPERATURE: 98.6 F | SYSTOLIC BLOOD PRESSURE: 118 MMHG | BODY MASS INDEX: 27.19 KG/M2 | WEIGHT: 183.6 LBS

## 2018-10-08 DIAGNOSIS — R10.11 RIGHT UPPER QUADRANT ABDOMINAL PAIN: Primary | ICD-10-CM

## 2018-10-08 DIAGNOSIS — R19.7 DIARRHEA, UNSPECIFIED TYPE: ICD-10-CM

## 2018-10-08 DIAGNOSIS — K59.00 CONSTIPATION, UNSPECIFIED CONSTIPATION TYPE: ICD-10-CM

## 2018-10-08 PROCEDURE — 99214 OFFICE O/P EST MOD 30 MIN: CPT | Performed by: INTERNAL MEDICINE

## 2018-10-08 RX ORDER — HYDROCORTISONE ACETATE 25 MG/1
25 SUPPOSITORY RECTAL DAILY
Qty: 10 SUPPOSITORY | Refills: 3 | Status: SHIPPED | OUTPATIENT
Start: 2018-10-08 | End: 2019-02-04

## 2018-10-08 NOTE — TELEPHONE ENCOUNTER
----- Message from Sadiq Alexander sent at 10/6/2018  9:53 PM EDT -----  Regarding: Prescription Question  Contact: [phone]  The dicyclomine has upset my stomach and have pretty sever diarrhea. Stopping the medication.

## 2018-10-08 NOTE — TELEPHONE ENCOUNTER
Call from pt.  States stopped dicyclomine because caused severe diarrhea.  Continues with persistent cramps.    Advise per Dr Allen recommendation of 10/4 that needs f/u.   Scheduled for today at 4 pm with Dr Allen.      Message to Charlotte adams: add on.

## 2018-10-08 NOTE — PROGRESS NOTES
Chief Complaint   Patient presents with   • Constipation   • Diarrhea   • Abdominal Pain   • blood on toilet paper   • Nausea        Sadiq Alexander is a  43 y.o. male here for a follow up visit for abdominal pain, change in bowel pattern    HPI this 43-year-old white male patient of Dr. Charly Jackson returns in follow-up since undergoing colonoscopy in September.  He is complained of bowel pattern changes ranging from constipation, to normal stool, to diarrhea.  These changes do not seem to be specific to diet although he does admit to some lactose intolerance.  His weight has been relatively stable.  He was tried on an anti-spasmodic specifically dicyclomine, but this caused diarrhea and further cramping and this was discontinued.  We talked about further evaluation of his small bowel and will initiate this with [facility] testing for both serologic and stool studies.  I will also obtain qualitative fecal fat studies.  He is going to apply hemorrhoidal cream and suppository as needed for any rectal discomfort or bleeding.  Pending these results he may warrant further evaluation such as hydrogen breath testing.    Past Medical History:   Diagnosis Date   • Anxiety    • Colon polyps    • Hyperlipidemia    • Migraines        Current Outpatient Prescriptions   Medication Sig Dispense Refill   • escitalopram (LEXAPRO) 10 MG tablet TAKE 1 TABLET BY MOUTH DAILY. 30 tablet 2   • fluticasone (FLONASE) 50 MCG/ACT nasal spray 2 sprays into each nostril Daily.     • rosuvastatin (CRESTOR) 10 MG tablet TAKE 1 TABLET BY MOUTH EVERY DAY 90 tablet 0   • ZOLMitriptan (ZOMIG-ZMT) 5 MG disintegrating tablet Take 1 tablet by mouth 2 (Two) Times a Day As Needed for Migraine. 10 tablet 0   • baclofen (LIORESAL) 10 MG tablet Take 1 tablet by mouth 3 (Three) Times a Day As Needed for Muscle Spasms. 21 tablet 0   • dicyclomine (BENTYL) 20 MG tablet Take 1 tablet by mouth Every 6 (Six) Hours. 40 tablet 2   • HYDROcodone-acetaminophen  (NORCO) 5-325 MG per tablet Take 1 tablet by mouth Every 4 (Four) Hours As Needed for Moderate Pain . 12 tablet 0   • hydrocortisone (ANUSOL-HC) 2.5 % rectal cream Insert  into the rectum 3 (Three) Times a Day As Needed for Hemorrhoids (rectal discomfort). 30 g 5   • hydrocortisone (ANUSOL-HC) 25 MG suppository Insert 1 suppository into the rectum Daily. Administer suppository at bedtime 10 suppository 3     No current facility-administered medications for this visit.        PRN Meds:.    No Known Allergies    Social History     Social History   • Marital status: Single     Spouse name: Doris   • Number of children: 3   • Years of education: N/A     Occupational History   • Software consulting      Social History Main Topics   • Smoking status: Former Smoker     Packs/day: 0.75     Years: 15.00     Types: Cigarettes     Quit date: 4/20/2018   • Smokeless tobacco: Former User     Types: Chew      Comment: Quit smoking 2 months ago   • Alcohol use 1.2 - 1.8 oz/week     2 - 3 Standard drinks or equivalent per week   • Drug use: No   • Sexual activity: Yes     Partners: Female     Other Topics Concern   • Not on file     Social History Narrative   • No narrative on file       Family History   Problem Relation Age of Onset   • Melanoma Father    • Depression Father    • No Known Problems Sister    • Parkinsonism Maternal Grandmother    • Lung cancer Maternal Grandfather         smoker   • Andrew's disease Maternal Grandfather    • Lung cancer Paternal Grandmother         smoker   • Lung cancer Paternal Aunt         smoker   • Cancer Maternal Aunt         spine   • Heart attack Paternal Uncle 55   • Colon cancer Neg Hx    • Prostate cancer Neg Hx        Review of Systems   Constitutional: Negative for activity change, appetite change, fatigue and unexpected weight change.   HENT: Negative for congestion, facial swelling, sore throat, trouble swallowing and voice change.    Eyes: Negative for photophobia and visual  disturbance.   Respiratory: Negative for cough and choking.    Cardiovascular: Negative for chest pain.   Gastrointestinal: Positive for abdominal pain, constipation and diarrhea. Negative for abdominal distention, anal bleeding, blood in stool, nausea, rectal pain and vomiting.   Endocrine: Negative for polyphagia.   Musculoskeletal: Negative for arthralgias, gait problem and joint swelling.   Skin: Negative for color change, pallor and rash.   Allergic/Immunologic: Negative for food allergies.   Neurological: Negative for speech difficulty and headaches.   Hematological: Does not bruise/bleed easily.   Psychiatric/Behavioral: Negative for agitation, confusion and sleep disturbance.       Vitals:    10/08/18 1601   BP: 118/70   Temp: 98.6 °F (37 °C)       Physical Exam   Constitutional: He is oriented to person, place, and time. He appears well-developed and well-nourished.   HENT:   Head: Normocephalic.   Mouth/Throat: Oropharynx is clear and moist.   Eyes: Conjunctivae and EOM are normal.   Neck: Normal range of motion.   Cardiovascular: Normal rate and regular rhythm.    Pulmonary/Chest: Breath sounds normal.   Abdominal: Soft. Bowel sounds are normal.   Musculoskeletal: Normal range of motion.   Neurological: He is alert and oriented to person, place, and time.   Skin: Skin is warm and dry.   Psychiatric: He has a normal mood and affect. His behavior is normal.       ASSESSMENT   #1 right upper quadrant abdominal pain: Presumed secondary to intestinal spasm or inflammation  #2 change in bowel pattern  #3 hemorrhoidal pain and bleeding      PLAN  [facility] testing  Prescriptions for Anusol HC suppositories and Analpram-HC cream 2.5%  Qualitative fecal fat study  We'll call patient with results      ICD-10-CM ICD-9-CM   1. Right upper quadrant abdominal pain R10.11 789.01   2. Diarrhea, unspecified type R19.7 787.91   3. Constipation, unspecified constipation type K59.00 564.00

## 2018-10-08 NOTE — TELEPHONE ENCOUNTER
----- Message from Regine Araujo sent at 10/8/2018 12:17 PM EDT -----  Regarding: dicyclomine-Din't work-next step  Contact: [phone]  CAUSED DIARRHEA. WHAT ELSE CAN HE DO? HE HAS STOPPED THE MED

## 2018-10-09 NOTE — TELEPHONE ENCOUNTER
[facility] stool and serum ordered online thru [facility].  Paper work and instructions given to pt. Pt verb understanding.

## 2018-10-15 LAB
FAT STL QL: NORMAL
NEUTRAL FAT STL QL: NORMAL

## 2018-10-16 ENCOUNTER — TELEPHONE (OUTPATIENT)
Dept: GASTROENTEROLOGY | Facility: CLINIC | Age: 43
End: 2018-10-16

## 2018-10-16 NOTE — TELEPHONE ENCOUNTER
Called pt and advised per Dr Allen  that his stool results were neg and we will await the [facility] results. Pt verb understanding.

## 2018-10-16 NOTE — TELEPHONE ENCOUNTER
----- Message from Chato WISE MD sent at 10/15/2018  5:07 PM EDT -----  Regarding: Fecal fat studies   Okay to call results, recommend await [facility] results  ----- Message -----  From: Shivani, Reflab Results In  Sent: 10/15/2018  10:35 AM  To: Chato WISE MD

## 2018-10-19 NOTE — TELEPHONE ENCOUNTER
With normal [facility] serologic studies and negative qualitative fecal fat studies, if still having diarrhea would offer hydrogen breath testing.

## 2018-10-22 NOTE — TELEPHONE ENCOUNTER
Called pt and advised per Dr Allen that with normal [facility] serologic studies and neg qualitative fecal fat studies , if still having diarrhea , he recommends hyrdrogen breath test.     Pt verb understanding and reports he is not having diarrhea, that now he is a little constipated so he will hold off on testing for now.

## 2018-10-24 ENCOUNTER — OFFICE VISIT (OUTPATIENT)
Dept: INTERNAL MEDICINE | Age: 43
End: 2018-10-24

## 2018-10-24 VITALS
HEART RATE: 66 BPM | WEIGHT: 186 LBS | DIASTOLIC BLOOD PRESSURE: 66 MMHG | OXYGEN SATURATION: 99 % | TEMPERATURE: 98.2 F | SYSTOLIC BLOOD PRESSURE: 110 MMHG | BODY MASS INDEX: 27.55 KG/M2 | HEIGHT: 69 IN

## 2018-10-24 DIAGNOSIS — Z23 FLU VACCINE NEED: ICD-10-CM

## 2018-10-24 DIAGNOSIS — F41.9 ANXIETY DISORDER, UNSPECIFIED TYPE: Chronic | ICD-10-CM

## 2018-10-24 DIAGNOSIS — G43.109 MIGRAINE WITH AURA AND WITHOUT STATUS MIGRAINOSUS, NOT INTRACTABLE: Primary | Chronic | ICD-10-CM

## 2018-10-24 DIAGNOSIS — K58.2 IRRITABLE BOWEL SYNDROME WITH BOTH CONSTIPATION AND DIARRHEA: ICD-10-CM

## 2018-10-24 PROBLEM — E78.5 HYPERLIPIDEMIA: Chronic | Status: ACTIVE | Noted: 2018-06-21

## 2018-10-24 PROCEDURE — 99214 OFFICE O/P EST MOD 30 MIN: CPT | Performed by: INTERNAL MEDICINE

## 2018-10-24 PROCEDURE — 90471 IMMUNIZATION ADMIN: CPT | Performed by: INTERNAL MEDICINE

## 2018-10-24 PROCEDURE — 90674 CCIIV4 VAC NO PRSV 0.5 ML IM: CPT | Performed by: INTERNAL MEDICINE

## 2018-10-24 RX ORDER — TOPIRAMATE 25 MG/1
1 CAPSULE, EXTENDED RELEASE ORAL DAILY
Qty: 7 CAPSULE | Refills: 0 | COMMUNITY
Start: 2018-10-24 | End: 2018-11-19 | Stop reason: CLARIF

## 2018-10-24 RX ORDER — PROMETHAZINE HYDROCHLORIDE 25 MG/1
25 TABLET ORAL EVERY 8 HOURS PRN
Qty: 20 TABLET | Refills: 0 | Status: SHIPPED | OUTPATIENT
Start: 2018-10-24 | End: 2020-01-14 | Stop reason: ALTCHOICE

## 2018-10-24 RX ORDER — TOPIRAMATE 50 MG/1
1 CAPSULE, EXTENDED RELEASE ORAL DAILY
Qty: 7 CAPSULE | Refills: 0 | Status: SHIPPED | OUTPATIENT
Start: 2018-10-24 | End: 2018-11-19 | Stop reason: CLARIF

## 2018-10-24 NOTE — PROGRESS NOTES
[facility] – [place] INTERNAL MEDICINE  SEJAL THACKER M.D.      Sadiq HERNANDES Alexander / 43 y.o. / male  10/24/2018      ASSESSMENT & PLAN:    Problem List Items Addressed This Visit        High    Migraines - Primary (Chronic)    Overview     Continue escitalopram 10 mg daily and zolmitriptan 5 mg as needed.          Current Assessment & Plan     Having more migraines. Discussed stress triggers. Trial smaples of Trokendi XR 25 mg qd x 1 week then 50 mg qd. Discount card given.          Relevant Medications    escitalopram (LEXAPRO) 10 MG tablet    baclofen (LIORESAL) 10 MG tablet    ZOLMitriptan (ZOMIG-ZMT) 5 MG disintegrating tablet    Topiramate ER (TROKENDI XR) 25 MG capsule sustained-release 24 hr    Topiramate ER (TROKENDI XR) 50 MG capsule sustained-release 24 hr    promethazine (PHENERGAN) 25 MG tablet    Irritable bowel syndrome with both constipation and diarrhea (Chronic)    Current Assessment & Plan     Maintain lactose free diet. Trial of FODMAPS/gluten free diet. Verify whether he has been screened for celiac disease. May try holding rosuvastatin to see if this helps.             Medium    Anxiety disorder (Chronic)    Overview     H/o ADHD.     Continue escitalopram 10 mg qd.          Current Assessment & Plan     Increased anxiety. Follow for now. Continue escitalopram 10 mg qd for now.          Relevant Medications    escitalopram (LEXAPRO) 10 MG tablet        No orders of the defined types were placed in this encounter.    New Medications Ordered This Visit   Medications   • Topiramate ER (TROKENDI XR) 25 MG capsule sustained-release 24 hr     Sig: Take 1 capsule by mouth Daily.     Dispense:  7 capsule     Refill:  0   • Topiramate ER (TROKENDI XR) 50 MG capsule sustained-release 24 hr     Sig: Take 1 capsule by mouth Daily.     Dispense:  7 capsule     Refill:  0   • promethazine (PHENERGAN) 25 MG tablet     Sig: Take 1 tablet by mouth Every 8 (Eight) Hours As Needed for Nausea or Vomiting.     Dispense:  20 tablet      "Refill:  0       Summary/Discussion:  ·     Return in about 6 weeks (around 12/5/2018) for ibs and migraine.    ____________________________________________________________________    MEDICATIONS  Current Outpatient Prescriptions   Medication Sig Dispense Refill   • escitalopram (LEXAPRO) 10 MG tablet TAKE 1 TABLET BY MOUTH DAILY. 30 tablet 2   • fluticasone (FLONASE) 50 MCG/ACT nasal spray 2 sprays into each nostril Daily.     • hydrocortisone (ANUSOL-HC) 2.5 % rectal cream Insert  into the rectum 3 (Three) Times a Day As Needed for Hemorrhoids (rectal discomfort). 30 g 5   • hydrocortisone (ANUSOL-HC) 25 MG suppository Insert 1 suppository into the rectum Daily. Administer suppository at bedtime 10 suppository 3   • rosuvastatin (CRESTOR) 10 MG tablet TAKE 1 TABLET BY MOUTH EVERY DAY 90 tablet 0   • ZOLMitriptan (ZOMIG-ZMT) 5 MG disintegrating tablet Take 1 tablet by mouth 2 (Two) Times a Day As Needed for Migraine. 10 tablet 0   • baclofen (LIORESAL) 10 MG tablet Take 1 tablet by mouth 3 (Three) Times a Day As Needed for Muscle Spasms. 21 tablet 0         VITALS:    Visit Vitals  /66   Pulse 66   Temp 98.2 °F (36.8 °C)   Ht 175.3 cm (69.02\")   Wt 84.4 kg (186 lb)   SpO2 99%   BMI 27.45 kg/m²       BP Readings from Last 3 Encounters:   10/24/18 110/66   10/08/18 118/70   08/10/18 100/62     Wt Readings from Last 3 Encounters:   10/24/18 84.4 kg (186 lb)   10/08/18 83.3 kg (183 lb 9.6 oz)   08/10/18 84.8 kg (187 lb)      Body mass index is 27.45 kg/m².    CC:  Main reason(s) for today's visit: stomach issues (several months) and migraines (discuss meds)      HPI:     Patient complains of constipation with episodes of diarrhea.  He recently underwent colonoscopy which was unremarkable.  Dr. Allen started him on dicyclomine which was not tolerated.  Complains of frequent bloating and cramping pain.  Denies unusual fever, loss of appetite, weight loss.  Patient also complains of increasing bouts of " migraines.  He complains of increased level of anxiety and stress which also appears to contribute to the migraines.  He takes Lexapro 10 mg daily and Zomig 5 mg as needed for migraines.    Patient Care Team:  Charly Jackson MD as PCP - General (Internal Medicine)    ____________________________________________________________________    REVIEW OF SYSTEMS    Review of Systems  Constitutional neg  Resp neg  CV neg  Psych negative for depression or suicidal ideations    PHYSICAL EXAMINATION    Physical Exam  Constitutional  No distress  Psychiatric  Alert. Judgment and thought content normal. Mood increased anxiety    REVIEWED DATA:    Labs:     Lab Results   Component Value Date     06/20/2018    K 4.8 06/20/2018    AST 24 09/21/2018    ALT 33 09/21/2018    BUN 13 06/20/2018    CREATININE 1.23 06/20/2018    CREATININE 1.34 (H) 02/13/2015    EGFRIFNONA 65 06/20/2018    EGFRIFAFRI 79 06/20/2018       Lab Results   Component Value Date    HGBA1C 5.51 06/20/2018    GLUCOSE 70 02/13/2015       Lab Results   Component Value Date    LDL 86 09/21/2018     (H) 06/20/2018    HDL 35 (L) 09/21/2018    HDL 42 06/20/2018    TRIG 102 09/21/2018    TRIG 208 (H) 06/20/2018    CHOLHDLRATIO 4.0 09/21/2018    CHOLHDLRATIO 6.14 06/20/2018       Lab Results   Component Value Date    TSH 0.867 06/20/2018    FREET4 1.10 06/20/2018       Lab Results   Component Value Date    WBC 7.62 02/13/2015    HGB 15.3 06/20/2018    HGB 15.6 02/13/2015     06/20/2018       Imaging:         Medical Tests:   Negative colonoscopy      Summary of old records / correspondence / consultant report:         Request outside records:         ALLERGIES  No Known Allergies     PFSH:     The following portions of the patient's history were reviewed and updated as appropriate: Allergies / Current Medications / Past Medical History / Surgical History / Social History / Family History    PROBLEM LIST   Patient Active Problem List   Diagnosis   • Colon  polyp   • Internal hemorrhoids   • Migraines   • Nicotine dependence   • Anxiety disorder   • Hyperlipidemia   • Irritable bowel syndrome with both constipation and diarrhea       PAST MEDICAL HISTORY  Past Medical History:   Diagnosis Date   • Anxiety    • Colon polyps    • Hyperlipidemia    • Migraines        SURGICAL HISTORY  Past Surgical History:   Procedure Laterality Date   • COLONOSCOPY N/A 03/11/2015    Two 4-6 mm polyps at the recto-sigmoid and in the mid ascending colon (PATH: tubulovillous adenoma w/ low-grade dysplasia & hyperplastic colon polyp at 20 cm), internal hemorrhoids; Dr. Chato Allen   • SHOULDER SURGERY Bilateral 2/18/1965, 6/2005       SOCIAL HISTORY  Social History     Social History   • Marital status: Single     Spouse name: Doris   • Number of children: 3     Occupational History   • Software consulting      Social History Main Topics   • Smoking status: Former Smoker     Packs/day: 0.75     Years: 15.00     Types: Cigarettes     Quit date: 4/20/2018   • Smokeless tobacco: Former User     Types: Chew      Comment: Quit smoking 2 months ago   • Alcohol use 1.2 - 1.8 oz/week     2 - 3 Standard drinks or equivalent per week   • Drug use: No   • Sexual activity: Yes     Partners: Female     Other Topics Concern   • Not on file       FAMILY HISTORY  Family History   Problem Relation Age of Onset   • Melanoma Father    • Depression Father    • No Known Problems Sister    • Parkinsonism Maternal Grandmother    • Lung cancer Maternal Grandfather         smoker   • [place]'s disease Maternal Grandfather    • Lung cancer Paternal Grandmother         smoker   • Lung cancer Paternal Aunt         smoker   • Cancer Maternal Aunt         spine   • Heart attack Paternal Uncle 55   • Colon cancer Neg Hx    • Prostate cancer Neg Hx          **Dragon Disclaimer:   Much of this encounter note is an electronic transcription/translation of spoken language to printed text. The electronic translation of  spoken language may permit erroneous, or at times, nonsensical words or phrases to be inadvertently transcribed. Although I have reviewed the note for such errors, some may still exist.

## 2018-10-24 NOTE — ASSESSMENT & PLAN NOTE
Having more migraines. Discussed stress triggers. Trial smaples of Trokendi XR 25 mg qd x 1 week then 50 mg qd. Discount card given.

## 2018-10-24 NOTE — ASSESSMENT & PLAN NOTE
Maintain lactose free diet. Trial of FODMAPS/gluten free diet. Verify whether he has been screened for celiac disease. May try holding rosuvastatin to see if this helps.

## 2018-10-25 DIAGNOSIS — G43.909 MIGRAINE WITHOUT STATUS MIGRAINOSUS, NOT INTRACTABLE, UNSPECIFIED MIGRAINE TYPE: ICD-10-CM

## 2018-10-25 DIAGNOSIS — F41.9 ANXIETY DISORDER, UNSPECIFIED TYPE: ICD-10-CM

## 2018-10-25 RX ORDER — ESCITALOPRAM OXALATE 10 MG/1
10 TABLET ORAL DAILY
Qty: 30 TABLET | Refills: 5 | Status: SHIPPED | OUTPATIENT
Start: 2018-10-25 | End: 2019-04-27 | Stop reason: SDUPTHER

## 2018-10-29 ENCOUNTER — TELEPHONE (OUTPATIENT)
Dept: GASTROENTEROLOGY | Facility: CLINIC | Age: 43
End: 2018-10-29

## 2018-10-29 DIAGNOSIS — R10.11 RUQ CRAMPING: Primary | ICD-10-CM

## 2018-10-29 NOTE — TELEPHONE ENCOUNTER
Patient had been offered hydrogen breath testing to assess for possible bacterial overgrowth.  Can schedule this if he still having diarrhea.

## 2018-10-29 NOTE — TELEPHONE ENCOUNTER
----- Message from Sadiq Alexander sent at 10/26/2018  3:07 PM EDT -----  Regarding: Visit Follow-Up Question  Contact: [phone]  Following up since last visit and labs. Diahrea has returned and stomach cramps continue to fluctuate on and off. Wondering if we should follow up with the next test?

## 2018-10-30 NOTE — TELEPHONE ENCOUNTER
Call to pt.  Advise per Dr Allen that had been offered hydrogen breath testing to assess for possible bacterial overgrowth. Can schedule this if still having diarrhea.    Advise pt will notify when kit ready for .  Verb understanding.    CDI requisition to Dr Allen.

## 2018-10-31 NOTE — TELEPHONE ENCOUNTER
CDI requisition faxed to  - confirmation received.    VM to pt with - identifies as Sadiq Alexander.  Message left that kit at .  Call if any questions.

## 2018-11-19 ENCOUNTER — TELEPHONE (OUTPATIENT)
Dept: INTERNAL MEDICINE | Age: 43
End: 2018-11-19

## 2018-11-19 DIAGNOSIS — G43.109 MIGRAINE WITH AURA AND WITHOUT STATUS MIGRAINOSUS, NOT INTRACTABLE: Chronic | ICD-10-CM

## 2018-11-19 RX ORDER — TOPIRAMATE 50 MG/1
1 CAPSULE, EXTENDED RELEASE ORAL DAILY
Qty: 30 CAPSULE | Refills: 5 | Status: SHIPPED | OUTPATIENT
Start: 2018-11-19 | End: 2018-12-10 | Stop reason: SDUPTHER

## 2018-11-26 ENCOUNTER — HOSPITAL ENCOUNTER (OUTPATIENT)
Dept: GENERAL RADIOLOGY | Facility: HOSPITAL | Age: 43
Discharge: HOME | End: 2018-11-26
Attending: INTERNAL MEDICINE | Admitting: INTERNAL MEDICINE

## 2018-11-26 DIAGNOSIS — R10.11 RUQ CRAMPING: ICD-10-CM

## 2018-11-26 PROCEDURE — 74249: CPT

## 2018-11-26 RX ADMIN — ANTACID/ANTIFLATULENT 1 TABLET: 380; 550; 10; 10 GRANULE, EFFERVESCENT ORAL at 08:12

## 2018-11-26 RX ADMIN — BARIUM SULFATE 183 ML: 960 POWDER, FOR SUSPENSION ORAL at 09:41

## 2018-11-26 RX ADMIN — BARIUM SULFATE 135 ML: 980 POWDER, FOR SUSPENSION ORAL at 08:12

## 2018-11-26 RX ADMIN — BARIUM SULFATE 183 ML: 960 POWDER, FOR SUSPENSION ORAL at 08:11

## 2018-11-28 ENCOUNTER — PREP FOR SURGERY (OUTPATIENT)
Dept: OTHER | Facility: HOSPITAL | Age: 43
End: 2018-11-28

## 2018-11-28 ENCOUNTER — TELEPHONE (OUTPATIENT)
Dept: GASTROENTEROLOGY | Facility: CLINIC | Age: 43
End: 2018-11-28

## 2018-11-28 DIAGNOSIS — R10.11 RIGHT UPPER QUADRANT ABDOMINAL PAIN: ICD-10-CM

## 2018-11-28 DIAGNOSIS — R93.89 ABNORMAL X-RAY: Primary | ICD-10-CM

## 2018-11-28 RX ORDER — SODIUM CHLORIDE, SODIUM LACTATE, POTASSIUM CHLORIDE, CALCIUM CHLORIDE 600; 310; 30; 20 MG/100ML; MG/100ML; MG/100ML; MG/100ML
30 INJECTION, SOLUTION INTRAVENOUS CONTINUOUS
Status: CANCELLED | OUTPATIENT
Start: 2019-01-14

## 2018-11-28 NOTE — TELEPHONE ENCOUNTER
Call from pt.  Advise per Dr Allen that UGI with SBFT with concern of possible esophagitis or Shaffer's esophagus, would offer EGD.    Pt verb understanding and states would like to proceed with EGD.  Advise that Scheduling will contact to arrange.  Verb understanding.    Message to Dr Allen.

## 2018-11-28 NOTE — TELEPHONE ENCOUNTER
----- Message from Chato WISE MD sent at 11/26/2018  4:05 PM EST -----  Regarding: Upper GI with small bowel follow-through results  Okay to call results, with concern of possible esophagitis or Shaffer's esophagus, would offer upper endoscopic evaluation.  ----- Message -----  From: Interface, Rad Results [place] In  Sent: 11/26/2018   1:56 PM  To: Chato WISE MD

## 2018-12-05 ENCOUNTER — TELEPHONE (OUTPATIENT)
Dept: GASTROENTEROLOGY | Facility: CLINIC | Age: 43
End: 2018-12-05

## 2018-12-05 NOTE — TELEPHONE ENCOUNTER
Regarding: Non-Urgent Medical Question  Contact: [phone]  ----- Message from Mychart, Generic sent at 12/5/2018  8:52 AM EST -----    I haven't heard from [facility] Scheduling for the scope yet. Guessing things are just very busy for end of year. I have been paying a little closer attention to my diet (no dairy, moderate fiber, lots of water), and bowel movements. I'm going about twice a week (usually Sunday and Wednesday) and with some minor changes in my diet it's been less diarrhea, but significant amount of feces. I have noticed I do have some acid reflux, I really just wasn't paying attention to it before over the cramps. Should I try something like prilosec otc in the meantime while waiting on the scheduling department for the scope? For cramping should I resume OTC meds like Pepto Bismol as well?

## 2018-12-05 NOTE — TELEPHONE ENCOUNTER
Okay to initiate PPI while waiting for upper endoscopy.  Can use symptomatic medications for treatment of cramps as well.

## 2018-12-05 NOTE — TELEPHONE ENCOUNTER
Call to pt.  Advise per Dr Allen that ok to initiate ppi while waiting for upper endoscopy.  Can use symptomatic meds for tx of cramps as well.  Pt verb understanding.

## 2018-12-10 ENCOUNTER — OFFICE VISIT (OUTPATIENT)
Dept: INTERNAL MEDICINE | Age: 43
End: 2018-12-10

## 2018-12-10 DIAGNOSIS — K21.00 GERD WITH ESOPHAGITIS: ICD-10-CM

## 2018-12-10 DIAGNOSIS — G43.109 MIGRAINE WITH AURA AND WITHOUT STATUS MIGRAINOSUS, NOT INTRACTABLE: Primary | Chronic | ICD-10-CM

## 2018-12-10 DIAGNOSIS — F41.9 ANXIETY DISORDER, UNSPECIFIED TYPE: Chronic | ICD-10-CM

## 2018-12-10 PROCEDURE — 99214 OFFICE O/P EST MOD 30 MIN: CPT | Performed by: INTERNAL MEDICINE

## 2018-12-10 RX ORDER — PANTOPRAZOLE SODIUM 40 MG/1
40 TABLET, DELAYED RELEASE ORAL DAILY
Qty: 30 TABLET | Refills: 3 | Status: SHIPPED | OUTPATIENT
Start: 2018-12-10 | End: 2019-04-08 | Stop reason: SDUPTHER

## 2018-12-10 RX ORDER — TOPIRAMATE 50 MG/1
1 CAPSULE, EXTENDED RELEASE ORAL DAILY
Qty: 30 CAPSULE | Refills: 5
Start: 2018-12-10 | End: 2019-05-06 | Stop reason: SDUPTHER

## 2018-12-10 NOTE — PROGRESS NOTES
[facility] – [place] INTERNAL MEDICINE  SEJAL THACKER M.D.      Sadiq HERNANDES Alexander / 43 y.o. / male  12/10/2018      ASSESSMENT & PLAN:    Problem List Items Addressed This Visit        High    Migraines - Primary (Chronic)    Overview     Continue escitalopram 10 mg daily and zolmitriptan 5 mg as needed.          Current Assessment & Plan     Increase Trokendi XR to 100 mg daily (take 50 mg BID).   Continue Zomig PRN.          Relevant Medications    ZOLMitriptan (ZOMIG-ZMT) 5 MG disintegrating tablet    promethazine (PHENERGAN) 25 MG tablet    escitalopram (LEXAPRO) 10 MG tablet    Topiramate ER (TROKENDI XR) 50 MG capsule sustained-release 24 hr       Medium    Anxiety disorder (Chronic)    Overview     H/o ADHD.     Continue escitalopram 10 mg qd.          Relevant Medications    escitalopram (LEXAPRO) 10 MG tablet       Low    GERD with esophagitis (Chronic)    Current Assessment & Plan     Noted on recent UGI series. Scheduled for EGD.          Relevant Medications    pantoprazole (PROTONIX) 40 MG EC tablet        No orders of the defined types were placed in this encounter.    New Medications Ordered This Visit   Medications   • Topiramate ER (TROKENDI XR) 50 MG capsule sustained-release 24 hr     Sig: Take 1 capsule by mouth Daily.     Dispense:  30 capsule     Refill:  5   • pantoprazole (PROTONIX) 40 MG EC tablet     Sig: Take 1 tablet by mouth Daily.     Dispense:  30 tablet     Refill:  3       Summary/Discussion:  ·     Return in about 4 months (around 4/10/2019) for Headaches.    ____________________________________________________________________    MEDICATIONS  Current Outpatient Medications   Medication Sig Dispense Refill   • escitalopram (LEXAPRO) 10 MG tablet TAKE 1 TABLET BY MOUTH DAILY. 30 tablet 5   • promethazine (PHENERGAN) 25 MG tablet Take 1 tablet by mouth Every 8 (Eight) Hours As Needed for Nausea or Vomiting. 20 tablet 0   • rosuvastatin (CRESTOR) 10 MG tablet TAKE 1 TABLET BY MOUTH EVERY DAY 90 tablet 0   •  Topiramate ER (TROKENDI XR) 50 MG capsule sustained-release 24 hr Take 1 capsule by mouth Daily. 30 capsule 5   • ZOLMitriptan (ZOMIG-ZMT) 5 MG disintegrating tablet Take 1 tablet by mouth 2 (Two) Times a Day As Needed for Migraine. 10 tablet 0   • fluticasone (FLONASE) 50 MCG/ACT nasal spray 2 sprays into each nostril Daily.     • hydrocortisone (ANUSOL-HC) 2.5 % rectal cream Insert  into the rectum 3 (Three) Times a Day As Needed for Hemorrhoids (rectal discomfort). 30 g 5   • hydrocortisone (ANUSOL-HC) 25 MG suppository Insert 1 suppository into the rectum Daily. Administer suppository at bedtime 10 suppository 3   • pantoprazole (PROTONIX) 40 MG EC tablet Take 1 tablet by mouth Daily. 30 tablet 3     No current facility-administered medications for this visit.          VITALS:    There were no vitals taken for this visit.    BP Readings from Last 3 Encounters:   10/24/18 110/66   10/08/18 118/70   08/10/18 100/62     Wt Readings from Last 3 Encounters:   10/24/18 84.4 kg (186 lb)   10/08/18 83.3 kg (183 lb 9.6 oz)   08/10/18 84.8 kg (187 lb)      There is no height or weight on file to calculate BMI.    CC:  Main reason(s) for today's visit: No chief complaint on file.      HPI:     Ongoing frequent migraine headaches. No change in intensity or quality. Pressure/weather and prior smoking cessation may be factors. On escitalopram daily for tension/anxiety. On Zomig 5 mg PRN. Takes 9 tabs over 2 months. Not too excited seeing a neurologist again. Recent upper GI series showed probable esophagitis (with small hiatal hernia) and possible Shaffer's. Needs to schedule EGD with GI. Not taking any PPI currently.     Patient Care Team:  Charly Jackson MD as PCP - General (Internal Medicine)    ____________________________________________________________________    REVIEW OF SYSTEMS    Review of Systems  Constitutional neg  Resp neg  CV neg  GI frequent dyspepsia, intermittent right upper abdominal discomfort, no melena or  bleeding; history of IBS  Psych anxiety stable      PHYSICAL EXAMINATION    Physical Exam  Constitutional  No distress  Cardiovascular Rate  normal . Rhythm: regular . Heart sounds:  Normal  Pulmonary/Chest  Effort normal. Breath sounds:  Normal  Psychiatric  Alert. Judgment and thought content normal. Mood normal    REVIEWED DATA:    Labs:     Lab Results   Component Value Date     06/20/2018    K 4.8 06/20/2018    AST 24 09/21/2018    ALT 33 09/21/2018    BUN 13 06/20/2018    CREATININE 1.23 06/20/2018    CREATININE 1.34 (H) 02/13/2015    EGFRIFNONA 65 06/20/2018    EGFRIFAFRI 79 06/20/2018       Lab Results   Component Value Date    HGBA1C 5.51 06/20/2018    GLUCOSE 70 02/13/2015       Lab Results   Component Value Date    LDL 86 09/21/2018     (H) 06/20/2018    HDL 35 (L) 09/21/2018    HDL 42 06/20/2018    TRIG 102 09/21/2018    TRIG 208 (H) 06/20/2018    CHOLHDLRATIO 4.0 09/21/2018    CHOLHDLRATIO 6.14 06/20/2018       Lab Results   Component Value Date    TSH 0.867 06/20/2018    FREET4 1.10 06/20/2018       Lab Results   Component Value Date    WBC 7.62 02/13/2015    HGB 15.3 06/20/2018    HGB 15.6 02/13/2015     06/20/2018       Lab Results   Component Value Date    PROTEIN Negative 06/20/2018    GLUCOSEU Negative 06/20/2018    BLOODU Negative 06/20/2018    NITRITEU Negative 06/20/2018    LEUKOCYTESUR Negative 06/20/2018       Imaging:     CLINICAL HISTORY: 43-year-old male with abdominal cramping pain and  alternating diarrhea with constipation.     AIR-CONTRAST UPPER GI SERIES AND SMALL BOWEL FOLLOW-THROUGH 11/26/2018     FINDINGS: The preliminary AP projections of abdomen and pelvis  demonstrate stool and traces of gas in the colon and some gas in the  stomach and small bowel. No dilated bowel is seen. Some pelvic  phleboliths are present in the pelvic cavity, bilaterally, and a small  calcific contaminant in colon or otherwise superimposing the left  abdomen apparently lateral to the  lateral margin of left kidney is  present. This is not definitely identified on CT abdomen and pelvis  study sections of 12/09/2002. Along with the possibility of  calcification being within the bowel lumen, the possibility of  developing soft tissue calcification or vascular calcification in the  left upper abdomen is not excluded. Minimal serpentine scoliosis and  traces of degenerative change in the spine are noted.     AIR-CONTRAST UPPER GI SERIES     The patient ingested gas-forming crystals, water, and both thick and  thin barium liquid mixtures for the current examinations. The patient  swallowed the barium without aspiration or deep laryngeal penetration.  Epiglottic undercoating is demonstrated. The esophagus has apparently  normal distensibility. The esophagogastric junction has luminal diameter  of 3.5 cm or greater on some images. A small sliding hiatal herniation  of the upper stomach is present. With the patient recumbent, there was  mild-to-moderate gastroesophageal reflux. There is thickening of  esophageal mucosal folds that may reflect esophagitis or Shaffer  esophagus, but that is nonspecific. The gastric contours are, with the  exception of the hiatal hernia, generally normal. One or 2 small areas  of prominent mucosal fold do not allow exclusion of small sessile polyps  in the body of the stomach. There was prompt emptying of some of the  barium from the stomach into the duodenum with prompt propulsion of  barium through the duodenum and into the jejunum. The duodenum distends  normally and has apparently normal mucosal pattern.     SMALL BOWEL FOLLOW-THROUGH     Progress of the barium meal through the mesenteric small bowel was  followed by exposure of serial abdominal radiographs. The barium began  to reach the cecum between 1 hour and 80 minutes following initial  administration of barium. Compression fluoroscopy and radiography of the  small bowel were performed by Dr. Saucedo with the  patient in multiple  projections on the x-ray table. Final overhead bilateral oblique and AP  projection radiographs of the small bowel were acquired. No abnormality  of caliber or mucosal contour of the mesenteric small bowel was seen.  The terminal ileum has apparently normal caliber and mucosal contours as  well as normal peristalsis.     A total of 3 minutes 34 seconds fluoroscopy was used. A total of 10  digital overhead radiographs and 81 digital fluoroscopic spot image  radiographs were acquired.     CONCLUSION: Small hiatal hernia with mild-to-moderate gastroesophageal  reflux and esophageal mucosal findings suggestive of esophagitis versus  Shaffer esophagus. Questionable scattered sessile polyps in the stomach.  No esophageal, gastric, or duodenal mass or mucosal ulcer was otherwise  seen. Small bowel transit time was within normal limits. No abnormality  of caliber or mucosal contour of the mesenteric small bowel was seen.     This report was finalized on 11/26/2018     Medical Tests:         Summary of old records / correspondence / consultant report:         Request outside records:         ALLERGIES  No Known Allergies     PFSH:     The following portions of the patient's history were reviewed and updated as appropriate: Allergies / Current Medications / Past Medical History / Surgical History / Social History / Family History    PROBLEM LIST   Patient Active Problem List   Diagnosis   • Colon polyp   • Internal hemorrhoids   • Migraines   • Nicotine dependence   • Anxiety disorder   • Hyperlipidemia   • Irritable bowel syndrome with both constipation and diarrhea   • GERD with esophagitis       PAST MEDICAL HISTORY  Past Medical History:   Diagnosis Date   • Anxiety    • Colon polyps    • Hyperlipidemia    • Migraines        SURGICAL HISTORY  Past Surgical History:   Procedure Laterality Date   • COLONOSCOPY N/A 03/11/2015    Two 4-6 mm polyps at the recto-sigmoid and in the mid ascending colon (PATH:  tubulovillous adenoma w/ low-grade dysplasia & hyperplastic colon polyp at 20 cm), internal hemorrhoids; Dr. Chato Allen   • SHOULDER SURGERY Bilateral 1965, 2005       SOCIAL HISTORY  Social History     Socioeconomic History   • Marital status: Single     Spouse name: Doris   • Number of children: 3   • Years of education: Not on file   • Highest education level: Not on file   Occupational History   • Occupation: Software consulting   Tobacco Use   • Smoking status: Former Smoker     Packs/day: 0.75     Years: 15.00     Pack years: 11.25     Types: Cigarettes     Last attempt to quit: 2018     Years since quittin.6   • Smokeless tobacco: Former User     Types: Chew   • Tobacco comment: Quit smoking 2 months ago   Substance and Sexual Activity   • Alcohol use: Yes     Frequency: 2-4 times a month     Drinks per session: 3 or 4   • Drug use: No   • Sexual activity: Yes     Partners: Female       FAMILY HISTORY  Family History   Problem Relation Age of Onset   • Melanoma Father    • Depression Father    • No Known Problems Sister    • Parkinsonism Maternal Grandmother    • Lung cancer Maternal Grandfather         smoker   • [place]'s disease Maternal Grandfather    • Lung cancer Paternal Grandmother         smoker   • Lung cancer Paternal Aunt         smoker   • Cancer Maternal Aunt         spine   • Heart attack Paternal Uncle 55   • Colon cancer Neg Hx    • Prostate cancer Neg Hx          **Dragon Disclaimer:   Much of this encounter note is an electronic transcription/translation of spoken language to printed text. The electronic translation of spoken language may permit erroneous, or at times, nonsensical words or phrases to be inadvertently transcribed. Although I have reviewed the note for such errors, some may still exist.

## 2018-12-21 DIAGNOSIS — E78.5 HYPERLIPIDEMIA, UNSPECIFIED HYPERLIPIDEMIA TYPE: ICD-10-CM

## 2018-12-21 RX ORDER — ROSUVASTATIN CALCIUM 10 MG/1
TABLET, COATED ORAL
Qty: 90 TABLET | Refills: 3 | Status: SHIPPED | OUTPATIENT
Start: 2018-12-21 | End: 2019-02-05 | Stop reason: HOSPADM

## 2018-12-28 ENCOUNTER — TELEPHONE (OUTPATIENT)
Dept: GASTROENTEROLOGY | Facility: CLINIC | Age: 43
End: 2018-12-28

## 2018-12-28 NOTE — TELEPHONE ENCOUNTER
Regarding: Non-Urgent Medical Question  Contact: [phone]  ----- Message from [employer], Generic sent at 12/28/2018  8:47 AM EST -----    Discussed upper GI results with my GP. He put me in Protonix which is helping with heart burn and excess mucus. Should I call [facility] scheduling for the scope to try to get in?

## 2018-12-28 NOTE — TELEPHONE ENCOUNTER
Called pt and advised that message was given to Brittney in scheduling and she should be reaching out to him today to get him scheduled. Pt verb understanding.

## 2018-12-31 ENCOUNTER — TELEPHONE (OUTPATIENT)
Dept: GASTROENTEROLOGY | Facility: CLINIC | Age: 43
End: 2018-12-31

## 2018-12-31 NOTE — TELEPHONE ENCOUNTER
Regarding: Non-Urgent Medical Question  Contact: [phone]  ----- Message from Daric Generic sent at 12/31/2018  8:47 AM EST -----    Still no word from scheduling. Stomach issues are still getting worse but I think it's related to what I eat. Trying to figure out exact trigger but I've vomited a few times after eating (pizza and Mexican).

## 2019-01-02 ENCOUNTER — TELEPHONE (OUTPATIENT)
Dept: GASTROENTEROLOGY | Facility: CLINIC | Age: 44
End: 2019-01-02

## 2019-01-02 NOTE — TELEPHONE ENCOUNTER
Spoke with Antonietta in scheduling and Dr Allen does have a slot open at [place] on 01/07/2019 at 1030am.  Pt verb understanding and would like this time slot.  Advised pt we cancel his appt for 01/14 and put him down for 01/07 at [facility].  Advised pt to arrive at 0930am for 1030am.  Pt verb understanding.

## 2019-01-02 NOTE — TELEPHONE ENCOUNTER
Regarding: Non-Urgent Medical Question  Contact: [phone]  ----- Message from [employer], Generic sent at 1/2/2019  6:26 AM EST -----    When I spoke to scheduling Dr. Allen has an opening on the 7th in the afternoon. Is it too late to switch to that? Symptoms are continuing to worsen and I don't want to push two weeks if I don't have to. Sorry for asking.

## 2019-01-07 ENCOUNTER — OUTSIDE FACILITY SERVICE (OUTPATIENT)
Dept: GASTROENTEROLOGY | Facility: CLINIC | Age: 44
End: 2019-01-07

## 2019-01-07 PROCEDURE — 43239 EGD BIOPSY SINGLE/MULTIPLE: CPT | Performed by: INTERNAL MEDICINE

## 2019-01-08 ENCOUNTER — TELEPHONE (OUTPATIENT)
Dept: GASTROENTEROLOGY | Facility: CLINIC | Age: 44
End: 2019-01-08

## 2019-01-08 DIAGNOSIS — K21.00 GERD WITH ESOPHAGITIS: Primary | Chronic | ICD-10-CM

## 2019-01-08 NOTE — TELEPHONE ENCOUNTER
Regarding: Non-Urgent Medical Question  Contact: [phone]  ----- Message from [employer], Generic sent at 1/7/2019  8:51 PM EST -----    Following up from scope today to request a test for gall bladder. Dr. Allne discussed symptoms with my wife while I was in recovery and said I should call and schedule an appointment to get it checked.

## 2019-01-08 NOTE — TELEPHONE ENCOUNTER
Would schedule ultrasound and pending results consider HIDA scan to assess both anatomy and physiology.

## 2019-01-09 NOTE — TELEPHONE ENCOUNTER
Call to pt.  Advise per DR Allen that will schedule US.  Advise [employer] [facility] will contact to arrange.  Verb understanding.     Order placed - message to DR Allen.

## 2019-01-10 ENCOUNTER — TELEPHONE (OUTPATIENT)
Dept: INTERNAL MEDICINE | Age: 44
End: 2019-01-10

## 2019-01-10 DIAGNOSIS — G43.109 MIGRAINE WITH AURA AND WITHOUT STATUS MIGRAINOSUS, NOT INTRACTABLE: Primary | Chronic | ICD-10-CM

## 2019-01-10 NOTE — TELEPHONE ENCOUNTER
Regarding: Referral Request  Contact: [phone]  ----- Message from [employer], Generic sent at 1/10/2019  7:17 AM EST -----    From our last conversation I made an appointment for a neurologist at [facility] for my migraines.  They need a referral though. Their fax number is [phone].     I've held off on doubling my Trokendi dose until I can figure out my stomach issues, I don't want to add anything that could cause further stomach pain and I saw that's a possible side effect. My next test is a gall bladder test, just waiting on scheduling.

## 2019-01-12 ENCOUNTER — DOCUMENTATION (OUTPATIENT)
Dept: INTERNAL MEDICINE | Age: 44
End: 2019-01-12

## 2019-01-12 RX ORDER — OSELTAMIVIR PHOSPHATE 75 MG/1
75 CAPSULE ORAL DAILY
Qty: 10 CAPSULE | Refills: 0 | Status: SHIPPED | OUTPATIENT
Start: 2019-01-12 | End: 2019-02-04

## 2019-01-14 ENCOUNTER — TELEPHONE (OUTPATIENT)
Dept: GASTROENTEROLOGY | Facility: CLINIC | Age: 44
End: 2019-01-14

## 2019-01-14 NOTE — TELEPHONE ENCOUNTER
Regarding: Test Results Question  Contact: [phone]  ----- Message from My[employer], Generic sent at 1/14/2019 12:05 PM EST -----    Any results from egd from 1/7? And still no word from aníbal in scheduling ultrasound for gall bladder test.     Thanks.

## 2019-01-14 NOTE — TELEPHONE ENCOUNTER
Called pt and advised that I will send a message to Dr Allen for his path results.  Also advised pt that he can call [facility] scheduling at [phone] and get his us scheduled. Pt verb understanding.

## 2019-01-15 ENCOUNTER — TELEPHONE (OUTPATIENT)
Dept: GASTROENTEROLOGY | Facility: CLINIC | Age: 44
End: 2019-01-15

## 2019-01-15 NOTE — TELEPHONE ENCOUNTER
Called pt and pt is looking for his path results. Advised pt that I have checked with manager and we do have his results .  He will scan them in his chart so Dr Allen can review.  Pt verb understanding.

## 2019-01-15 NOTE — TELEPHONE ENCOUNTER
Pt calling again for results.  Results scanned under media tab.  Advised pt that his duodenum bx showed active duodentits and was neg for celaic.      The stomach bx showed mild inflammation and was neg for h pylori . The ge junction bx showed mild chronic inflammation and mild active esophagitis.  It was neg for mccarty's esophagus.    Advised pt would send message to Dr Allen for recommendations.

## 2019-01-15 NOTE — TELEPHONE ENCOUNTER
----- Message from Ashwin Gunderson sent at 1/15/2019 11:04 AM EST -----  Regarding: Lab questions  Contact: [phone]  Please call.

## 2019-01-16 ENCOUNTER — HOSPITAL ENCOUNTER (OUTPATIENT)
Dept: ULTRASOUND IMAGING | Facility: HOSPITAL | Age: 44
Discharge: HOME | End: 2019-01-16
Attending: INTERNAL MEDICINE | Admitting: INTERNAL MEDICINE

## 2019-01-16 DIAGNOSIS — K21.00 GERD WITH ESOPHAGITIS: Chronic | ICD-10-CM

## 2019-01-16 PROCEDURE — 76705 ECHO EXAM OF ABDOMEN: CPT

## 2019-01-16 NOTE — TELEPHONE ENCOUNTER
Called pt and advised per Dr Allen that he should continue ppi , and he is awaiting on us results. If us results neg , he will then recommends a hida scan to assess gb function. Pt verb understanding.

## 2019-01-16 NOTE — TELEPHONE ENCOUNTER
Would continue PPI, awaiting ultrasound results.  If ultrasound is negative, would then offer HIDA scan.

## 2019-01-22 ENCOUNTER — TELEPHONE (OUTPATIENT)
Dept: GASTROENTEROLOGY | Facility: CLINIC | Age: 44
End: 2019-01-22

## 2019-01-22 DIAGNOSIS — R10.11 RUQ PAIN: Primary | ICD-10-CM

## 2019-01-22 NOTE — TELEPHONE ENCOUNTER
----- Message from Chato WISE MD sent at 1/16/2019  3:48 PM EST -----  Regarding: Ultrasound of gallbladder results  Okay to call results, recommend HIDA scan if symptoms persist.  ----- Message -----  From: Interface, Rad Results [place] In  Sent: 1/16/2019  12:19 PM  To: Chato WISE MD

## 2019-01-22 NOTE — TELEPHONE ENCOUNTER
Call to pt.  Advise GB US negative.    Advise per DR Allen that recommend HIDA scan if symptoms persist.  Pt verb understanding.  States has ongoing symptoms.  Advise will order and [employer] [facility] will contact to arrange.  Verb understanding.    Order placed - message to Dr Allen.

## 2019-01-31 ENCOUNTER — HOSPITAL ENCOUNTER (OUTPATIENT)
Dept: NUCLEAR MEDICINE | Facility: HOSPITAL | Age: 44
Discharge: HOME | End: 2019-01-31
Attending: INTERNAL MEDICINE

## 2019-01-31 ENCOUNTER — TELEPHONE (OUTPATIENT)
Dept: GASTROENTEROLOGY | Facility: CLINIC | Age: 44
End: 2019-01-31

## 2019-01-31 DIAGNOSIS — R10.11 RUQ PAIN: ICD-10-CM

## 2019-01-31 PROCEDURE — A9537 TC99M MEBROFENIN: HCPCS | Performed by: INTERNAL MEDICINE

## 2019-01-31 PROCEDURE — 0 TECHNETIUM TC 99M MEBROFENIN KIT: Performed by: INTERNAL MEDICINE

## 2019-01-31 PROCEDURE — 78226 HEPATOBILIARY SYSTEM IMAGING: CPT

## 2019-01-31 RX ORDER — KIT FOR THE PREPARATION OF TECHNETIUM TC 99M MEBROFENIN 45 MG/10ML
1 INJECTION, POWDER, LYOPHILIZED, FOR SOLUTION INTRAVENOUS
Status: COMPLETED | OUTPATIENT
Start: 2019-01-31 | End: 2019-01-31

## 2019-01-31 RX ADMIN — MEBROFENIN 1 DOSE: 45 INJECTION, POWDER, LYOPHILIZED, FOR SOLUTION INTRAVENOUS at 10:15

## 2019-02-01 RX ORDER — ONDANSETRON HYDROCHLORIDE 8 MG/1
8 TABLET, FILM COATED ORAL EVERY 8 HOURS PRN
Qty: 21 TABLET | Refills: 2 | Status: SHIPPED | OUTPATIENT
Start: 2019-02-01 | End: 2019-04-08

## 2019-02-01 NOTE — TELEPHONE ENCOUNTER
Trina troy per DR Allen order.      Call to pt to advise of same.  Verb understanding.  Has appt with Dr Hoffman on 2/4.

## 2019-02-01 NOTE — TELEPHONE ENCOUNTER
Call to pt.  Advise per Dr Allen that HIDA reflects a 20% EF, which is well below the normal range.  May benefit from surgical assessment and would consider referral to Dr Gomez and his group.    Pt verb understanding.  Agreeable to Dr Gomez.    States has missed last 2 days of work.  Nausea all the time.   [facility] has phenergan on hand, but this knocks him out.   Has had good results with zofran in the past - requesting zofran for nausea.    Request to Dr Allen.    Message to Scheduling with request to expedite referral ASAP.

## 2019-02-04 ENCOUNTER — OFFICE VISIT (OUTPATIENT)
Dept: SURGERY | Facility: CLINIC | Age: 44
End: 2019-02-04

## 2019-02-04 VITALS — OXYGEN SATURATION: 96 % | HEART RATE: 76 BPM | BODY MASS INDEX: 26.63 KG/M2 | HEIGHT: 69 IN | WEIGHT: 179.8 LBS

## 2019-02-04 DIAGNOSIS — K82.8 BILIARY DYSKINESIA: Primary | ICD-10-CM

## 2019-02-04 PROCEDURE — 99203 OFFICE O/P NEW LOW 30 MIN: CPT | Performed by: SURGERY

## 2019-02-04 RX ORDER — ROSUVASTATIN CALCIUM 10 MG/1
10 TABLET, COATED ORAL DAILY
COMMUNITY
End: 2020-01-20

## 2019-02-04 RX ORDER — CEFAZOLIN SODIUM 2 G/100ML
2 INJECTION, SOLUTION INTRAVENOUS ONCE
Status: CANCELLED | OUTPATIENT
Start: 2019-02-05 | End: 2019-02-04

## 2019-02-04 NOTE — PROGRESS NOTES
Cc: Nausea, vomiting, abdominal pain    History of presenting illness:   This is a very nice 43-year-old gentleman with history of about 10-12 months of progressively worsening upper abdominal pain, upper abdominal area on the right side with some radiation into his back and made worse by eating.  Nothing seems to improve it.  He has had an extensive workup including an upper GI, upper and lower endoscopy, gallbladder ultrasound and HIDA scan.  He has not had any improvement with use of proton pump inhibitors.  He describes approximately a 20 pound weight loss over the past couple of months, and says that the pain now is much more intense than it had been early on.    Past Medical History: Hyperlipidemia, migraine headaches    Past Surgical History: Bilateral shoulder surgery, Lasix eye surgery, no abdominal surgery    Medications: Lexapro, Flonase, Zofran, Protonix, Phenergan, Crestor, topiramate    Allergies: None known    Social History: Patient works from home as a , he is a nonsmoker, admits to drinking alcohol once or twice a week    Family History: Negative for known gallbladder disease, negative for colorectal cancer    Review of Systems:  Constitutional: Positive for decreased appetite and unanticipated weight loss, negative for fever or chills  Neck: no swollen glands or dysphagia or odynophagia  Respiratory: negative for SOB, cough, hemoptysis or wheezing  Cardiovascular: negative for chest pain, palpitations or peripheral edema  Gastrointestinal: Positive for nausea, vomiting, abdominal pain, negative for rectal bleeding      Physical Exam:   body mass index 26.6  General: alert and oriented, appropriate, no acute distress  Neck: Supple without lymphadenopathy or thyromegaly, trachea is in the midline  Respiratory: Lungs are clear bilaterally without wheezing, no use of accessory muscles is noted  Cardiovascular: Regular rate and rhythm without murmur, no peripheral  edema  Gastrointestinal: Soft, mild tenderness in the epigastrium and right upper quadrant without guarding or rebound.  There is a scar in the right upper quadrant from a prior mole excision.  No mass or hernia.    Laboratory data: Most recent total bilirubin 0.2, AST and ALT normal at 24 and 33 respectively.    Imaging data: Gallbladder ultrasound reviewed and was essentially negative.  HIDA scan demonstrates good filling of the gallbladder, but a decreased ejection fraction estimated at 20% when stimulated with boost.      Assessment and plan:   -Biliary dyskinesia  Plan: Laparoscopic cholecystectomy with cholangiogram.    Options discussed with the patient.  I do recommend cholecystectomy, as many of his symptoms are certainly consistent with that of biliary tract disease.  I did tell him that given his other symptoms and the nature of biliary dyskinesia that some GI symptoms may persist after cholecystectomy.  I think it would probably be wise for him to keep in touch with his gastroenterologist long-term.  The risks and benefits of cholecystectomy, including bleeding, infection, injury to intra-abdominal structures including, but not limited to, the common bile duct, liver, stomach, duodenum, colon, small intestine and vascular structures were discussed and the patient is willing to proceed.      Reji Hoffman MD, FACS  General, Minimally Invasive and Endoscopic Surgery  [facility]    [address], [address]  [place], [place], [address]  P: [phone]  F: [phone]

## 2019-02-04 NOTE — H&P (VIEW-ONLY)
Cc: Nausea, vomiting, abdominal pain    History of presenting illness:   This is a very nice 43-year-old gentleman with history of about 10-12 months of progressively worsening upper abdominal pain, upper abdominal area on the right side with some radiation into his back and made worse by eating.  Nothing seems to improve it.  He has had an extensive workup including an upper GI, upper and lower endoscopy, gallbladder ultrasound and HIDA scan.  He has not had any improvement with use of proton pump inhibitors.  He describes approximately a 20 pound weight loss over the past couple of months, and says that the pain now is much more intense than it had been early on.    Past Medical History: Hyperlipidemia, migraine headaches    Past Surgical History: Bilateral shoulder surgery, Lasix eye surgery, no abdominal surgery    Medications: Lexapro, Flonase, Zofran, Protonix, Phenergan, Crestor, topiramate    Allergies: None known    Social History: Patient works from home as a , he is a nonsmoker, admits to drinking alcohol once or twice a week    Family History: Negative for known gallbladder disease, negative for colorectal cancer    Review of Systems:  Constitutional: Positive for decreased appetite and unanticipated weight loss, negative for fever or chills  Neck: no swollen glands or dysphagia or odynophagia  Respiratory: negative for SOB, cough, hemoptysis or wheezing  Cardiovascular: negative for chest pain, palpitations or peripheral edema  Gastrointestinal: Positive for nausea, vomiting, abdominal pain, negative for rectal bleeding      Physical Exam:   body mass index 26.6  General: alert and oriented, appropriate, no acute distress  Neck: Supple without lymphadenopathy or thyromegaly, trachea is in the midline  Respiratory: Lungs are clear bilaterally without wheezing, no use of accessory muscles is noted  Cardiovascular: Regular rate and rhythm without murmur, no peripheral  edema  Gastrointestinal: Soft, mild tenderness in the epigastrium and right upper quadrant without guarding or rebound.  There is a scar in the right upper quadrant from a prior mole excision.  No mass or hernia.    Laboratory data: Most recent total bilirubin 0.2, AST and ALT normal at 24 and 33 respectively.    Imaging data: Gallbladder ultrasound reviewed and was essentially negative.  HIDA scan demonstrates good filling of the gallbladder, but a decreased ejection fraction estimated at 20% when stimulated with boost.      Assessment and plan:   -Biliary dyskinesia  Plan: Laparoscopic cholecystectomy with cholangiogram.    Options discussed with the patient.  I do recommend cholecystectomy, as many of his symptoms are certainly consistent with that of biliary tract disease.  I did tell him that given his other symptoms and the nature of biliary dyskinesia that some GI symptoms may persist after cholecystectomy.  I think it would probably be wise for him to keep in touch with his gastroenterologist long-term.  The risks and benefits of cholecystectomy, including bleeding, infection, injury to intra-abdominal structures including, but not limited to, the common bile duct, liver, stomach, duodenum, colon, small intestine and vascular structures were discussed and the patient is willing to proceed.      Reji Hoffman MD, FACS  General, Minimally Invasive and Endoscopic Surgery  [facility]    [address], [address]  [place], [place], [address]  P: [phone]  F: [phone]

## 2019-02-05 ENCOUNTER — ANESTHESIA EVENT (OUTPATIENT)
Dept: PERIOP | Facility: HOSPITAL | Age: 44
End: 2019-02-05

## 2019-02-05 ENCOUNTER — ANESTHESIA (OUTPATIENT)
Dept: PERIOP | Facility: HOSPITAL | Age: 44
End: 2019-02-05

## 2019-02-05 ENCOUNTER — APPOINTMENT (OUTPATIENT)
Dept: GENERAL RADIOLOGY | Facility: HOSPITAL | Age: 44
End: 2019-02-05

## 2019-02-05 ENCOUNTER — HOSPITAL ENCOUNTER (OUTPATIENT)
Facility: HOSPITAL | Age: 44
Setting detail: HOSPITAL OUTPATIENT SURGERY
Discharge: HOME | End: 2019-02-05
Attending: SURGERY | Admitting: SURGERY

## 2019-02-05 VITALS
RESPIRATION RATE: 18 BRPM | HEART RATE: 74 BPM | HEIGHT: 69 IN | WEIGHT: 179.2 LBS | SYSTOLIC BLOOD PRESSURE: 117 MMHG | BODY MASS INDEX: 26.54 KG/M2 | DIASTOLIC BLOOD PRESSURE: 70 MMHG | OXYGEN SATURATION: 98 % | TEMPERATURE: 97.7 F

## 2019-02-05 DIAGNOSIS — K82.8 BILIARY DYSKINESIA: ICD-10-CM

## 2019-02-05 LAB
ALBUMIN SERPL-MCNC: 4.5 G/DL (ref 3.5–5.2)
ALBUMIN/GLOB SERPL: 2 G/DL
ALP SERPL-CCNC: 73 U/L (ref 39–117)
ALT SERPL W P-5'-P-CCNC: 20 U/L (ref 1–41)
ANION GAP SERPL CALCULATED.3IONS-SCNC: 12.3 MMOL/L
AST SERPL-CCNC: 17 U/L (ref 1–40)
BASOPHILS # BLD AUTO: 0.01 10*3/MM3 (ref 0–0.2)
BASOPHILS NFR BLD AUTO: 0.1 % (ref 0–1.5)
BILIRUB SERPL-MCNC: 0.4 MG/DL (ref 0.1–1.2)
BUN BLD-MCNC: 14 MG/DL (ref 6–20)
BUN/CREAT SERPL: 10.1 (ref 7–25)
CALCIUM SPEC-SCNC: 9.5 MG/DL (ref 8.6–10.5)
CHLORIDE SERPL-SCNC: 106 MMOL/L (ref 98–107)
CO2 SERPL-SCNC: 24.7 MMOL/L (ref 22–29)
CREAT BLD-MCNC: 1.39 MG/DL (ref 0.76–1.27)
DEPRECATED RDW RBC AUTO: 43.3 FL (ref 37–54)
EOSINOPHIL # BLD AUTO: 0.16 10*3/MM3 (ref 0–0.7)
EOSINOPHIL NFR BLD AUTO: 2.3 % (ref 0.3–6.2)
ERYTHROCYTE [DISTWIDTH] IN BLOOD BY AUTOMATED COUNT: 12.8 % (ref 11.5–14.5)
GFR SERPL CREATININE-BSD FRML MDRD: 56 ML/MIN/1.73
GLOBULIN UR ELPH-MCNC: 2.2 GM/DL
GLUCOSE BLD-MCNC: 56 MG/DL (ref 65–99)
HCT VFR BLD AUTO: 43.6 % (ref 40.4–52.2)
HGB BLD-MCNC: 14.6 G/DL (ref 13.7–17.6)
IMM GRANULOCYTES # BLD AUTO: 0.02 10*3/MM3 (ref 0–0.03)
IMM GRANULOCYTES NFR BLD AUTO: 0.3 % (ref 0–0.5)
LYMPHOCYTES # BLD AUTO: 2.41 10*3/MM3 (ref 0.9–4.8)
LYMPHOCYTES NFR BLD AUTO: 35.3 % (ref 19.6–45.3)
MCH RBC QN AUTO: 30.9 PG (ref 27–32.7)
MCHC RBC AUTO-ENTMCNC: 33.5 G/DL (ref 32.6–36.4)
MCV RBC AUTO: 92.2 FL (ref 79.8–96.2)
MONOCYTES # BLD AUTO: 0.61 10*3/MM3 (ref 0.2–1.2)
MONOCYTES NFR BLD AUTO: 8.9 % (ref 5–12)
NEUTROPHILS # BLD AUTO: 3.64 10*3/MM3 (ref 1.9–8.1)
NEUTROPHILS NFR BLD AUTO: 53.4 % (ref 42.7–76)
PLATELET # BLD AUTO: 211 10*3/MM3 (ref 140–500)
PMV BLD AUTO: 10.9 FL (ref 6–12)
POTASSIUM BLD-SCNC: 4.2 MMOL/L (ref 3.5–5.2)
PROT SERPL-MCNC: 6.7 G/DL (ref 6–8.5)
RBC # BLD AUTO: 4.73 10*6/MM3 (ref 4.6–6)
SODIUM BLD-SCNC: 143 MMOL/L (ref 136–145)
WBC NRBC COR # BLD: 6.83 10*3/MM3 (ref 4.5–10.7)

## 2019-02-05 PROCEDURE — 25010000002 FENTANYL CITRATE (PF) 100 MCG/2ML SOLUTION: Performed by: NURSE ANESTHETIST, CERTIFIED REGISTERED

## 2019-02-05 PROCEDURE — 0 IOTHALAMATE 60 % SOLUTION: Performed by: SURGERY

## 2019-02-05 PROCEDURE — 25010000003 CEFAZOLIN IN DEXTROSE 2-4 GM/100ML-% SOLUTION: Performed by: SURGERY

## 2019-02-05 PROCEDURE — 85025 COMPLETE CBC W/AUTO DIFF WBC: CPT | Performed by: SURGERY

## 2019-02-05 PROCEDURE — 47563 LAPARO CHOLECYSTECTOMY/GRAPH: CPT | Performed by: PHYSICIAN ASSISTANT

## 2019-02-05 PROCEDURE — 80053 COMPREHEN METABOLIC PANEL: CPT | Performed by: SURGERY

## 2019-02-05 PROCEDURE — 25010000002 MIDAZOLAM PER 1 MG: Performed by: NURSE ANESTHETIST, CERTIFIED REGISTERED

## 2019-02-05 PROCEDURE — 36415 COLL VENOUS BLD VENIPUNCTURE: CPT | Performed by: SURGERY

## 2019-02-05 PROCEDURE — 47563 LAPARO CHOLECYSTECTOMY/GRAPH: CPT | Performed by: SURGERY

## 2019-02-05 PROCEDURE — 74300 X-RAY BILE DUCTS/PANCREAS: CPT

## 2019-02-05 PROCEDURE — 25010000002 MIDAZOLAM PER 1 MG: Performed by: ANESTHESIOLOGY

## 2019-02-05 PROCEDURE — 25010000002 HYDROMORPHONE PER 4 MG: Performed by: NURSE ANESTHETIST, CERTIFIED REGISTERED

## 2019-02-05 PROCEDURE — 25010000002 ONDANSETRON PER 1 MG: Performed by: NURSE ANESTHETIST, CERTIFIED REGISTERED

## 2019-02-05 PROCEDURE — 25010000002 PROPOFOL 10 MG/ML EMULSION: Performed by: NURSE ANESTHETIST, CERTIFIED REGISTERED

## 2019-02-05 PROCEDURE — 88304 TISSUE EXAM BY PATHOLOGIST: CPT | Performed by: SURGERY

## 2019-02-05 RX ORDER — SODIUM CHLORIDE, SODIUM LACTATE, POTASSIUM CHLORIDE, CALCIUM CHLORIDE 600; 310; 30; 20 MG/100ML; MG/100ML; MG/100ML; MG/100ML
9 INJECTION, SOLUTION INTRAVENOUS CONTINUOUS
Status: DISCONTINUED | OUTPATIENT
Start: 2019-02-05 | End: 2019-02-05 | Stop reason: HOSPADM

## 2019-02-05 RX ORDER — DIPHENHYDRAMINE HCL 25 MG
25 CAPSULE ORAL
Status: DISCONTINUED | OUTPATIENT
Start: 2019-02-05 | End: 2019-02-05 | Stop reason: HOSPADM

## 2019-02-05 RX ORDER — MIDAZOLAM HYDROCHLORIDE 1 MG/ML
INJECTION INTRAMUSCULAR; INTRAVENOUS AS NEEDED
Status: DISCONTINUED | OUTPATIENT
Start: 2019-02-05 | End: 2019-02-05 | Stop reason: SURG

## 2019-02-05 RX ORDER — PROMETHAZINE HYDROCHLORIDE 25 MG/ML
12.5 INJECTION, SOLUTION INTRAMUSCULAR; INTRAVENOUS ONCE AS NEEDED
Status: DISCONTINUED | OUTPATIENT
Start: 2019-02-05 | End: 2019-02-05 | Stop reason: HOSPADM

## 2019-02-05 RX ORDER — MAGNESIUM HYDROXIDE 1200 MG/15ML
LIQUID ORAL AS NEEDED
Status: DISCONTINUED | OUTPATIENT
Start: 2019-02-05 | End: 2019-02-05 | Stop reason: HOSPADM

## 2019-02-05 RX ORDER — LIDOCAINE HYDROCHLORIDE 10 MG/ML
0.5 INJECTION, SOLUTION EPIDURAL; INFILTRATION; INTRACAUDAL; PERINEURAL ONCE AS NEEDED
Status: DISCONTINUED | OUTPATIENT
Start: 2019-02-05 | End: 2019-02-05 | Stop reason: HOSPADM

## 2019-02-05 RX ORDER — DIPHENHYDRAMINE HYDROCHLORIDE 50 MG/ML
12.5 INJECTION INTRAMUSCULAR; INTRAVENOUS
Status: DISCONTINUED | OUTPATIENT
Start: 2019-02-05 | End: 2019-02-05 | Stop reason: HOSPADM

## 2019-02-05 RX ORDER — ONDANSETRON 2 MG/ML
INJECTION INTRAMUSCULAR; INTRAVENOUS AS NEEDED
Status: DISCONTINUED | OUTPATIENT
Start: 2019-02-05 | End: 2019-02-05 | Stop reason: SURG

## 2019-02-05 RX ORDER — SODIUM CHLORIDE 0.9 % (FLUSH) 0.9 %
1-10 SYRINGE (ML) INJECTION AS NEEDED
Status: DISCONTINUED | OUTPATIENT
Start: 2019-02-05 | End: 2019-02-05 | Stop reason: HOSPADM

## 2019-02-05 RX ORDER — NALOXONE HCL 0.4 MG/ML
0.2 VIAL (ML) INJECTION AS NEEDED
Status: DISCONTINUED | OUTPATIENT
Start: 2019-02-05 | End: 2019-02-05 | Stop reason: HOSPADM

## 2019-02-05 RX ORDER — ONDANSETRON 2 MG/ML
4 INJECTION INTRAMUSCULAR; INTRAVENOUS ONCE AS NEEDED
Status: DISCONTINUED | OUTPATIENT
Start: 2019-02-05 | End: 2019-02-05 | Stop reason: HOSPADM

## 2019-02-05 RX ORDER — PROMETHAZINE HYDROCHLORIDE 25 MG/1
25 TABLET ORAL ONCE AS NEEDED
Status: DISCONTINUED | OUTPATIENT
Start: 2019-02-05 | End: 2019-02-05 | Stop reason: HOSPADM

## 2019-02-05 RX ORDER — FENTANYL CITRATE 50 UG/ML
INJECTION, SOLUTION INTRAMUSCULAR; INTRAVENOUS AS NEEDED
Status: DISCONTINUED | OUTPATIENT
Start: 2019-02-05 | End: 2019-02-05 | Stop reason: SURG

## 2019-02-05 RX ORDER — MIDAZOLAM HYDROCHLORIDE 1 MG/ML
2 INJECTION INTRAMUSCULAR; INTRAVENOUS
Status: DISCONTINUED | OUTPATIENT
Start: 2019-02-05 | End: 2019-02-05 | Stop reason: HOSPADM

## 2019-02-05 RX ORDER — BUPIVACAINE HYDROCHLORIDE 2.5 MG/ML
INJECTION, SOLUTION EPIDURAL; INFILTRATION; INTRACAUDAL AS NEEDED
Status: DISCONTINUED | OUTPATIENT
Start: 2019-02-05 | End: 2019-02-05 | Stop reason: HOSPADM

## 2019-02-05 RX ORDER — HYDROCODONE BITARTRATE AND ACETAMINOPHEN 7.5; 325 MG/1; MG/1
1 TABLET ORAL EVERY 6 HOURS PRN
Qty: 30 TABLET | Refills: 0 | Status: SHIPPED | OUTPATIENT
Start: 2019-02-05 | End: 2019-02-13

## 2019-02-05 RX ORDER — PROMETHAZINE HYDROCHLORIDE 25 MG/1
25 SUPPOSITORY RECTAL ONCE AS NEEDED
Status: DISCONTINUED | OUTPATIENT
Start: 2019-02-05 | End: 2019-02-05 | Stop reason: HOSPADM

## 2019-02-05 RX ORDER — FENTANYL CITRATE 50 UG/ML
50 INJECTION, SOLUTION INTRAMUSCULAR; INTRAVENOUS
Status: DISCONTINUED | OUTPATIENT
Start: 2019-02-05 | End: 2019-02-05 | Stop reason: HOSPADM

## 2019-02-05 RX ORDER — LABETALOL HYDROCHLORIDE 5 MG/ML
5 INJECTION, SOLUTION INTRAVENOUS
Status: DISCONTINUED | OUTPATIENT
Start: 2019-02-05 | End: 2019-02-05 | Stop reason: HOSPADM

## 2019-02-05 RX ORDER — OXYCODONE AND ACETAMINOPHEN 7.5; 325 MG/1; MG/1
1 TABLET ORAL ONCE AS NEEDED
Status: DISCONTINUED | OUTPATIENT
Start: 2019-02-05 | End: 2019-02-05 | Stop reason: HOSPADM

## 2019-02-05 RX ORDER — MIDAZOLAM HYDROCHLORIDE 1 MG/ML
1 INJECTION INTRAMUSCULAR; INTRAVENOUS
Status: DISCONTINUED | OUTPATIENT
Start: 2019-02-05 | End: 2019-02-05 | Stop reason: HOSPADM

## 2019-02-05 RX ORDER — SODIUM CHLORIDE 9 MG/ML
INJECTION, SOLUTION INTRAVENOUS AS NEEDED
Status: DISCONTINUED | OUTPATIENT
Start: 2019-02-05 | End: 2019-02-05 | Stop reason: HOSPADM

## 2019-02-05 RX ORDER — HYDROCODONE BITARTRATE AND ACETAMINOPHEN 7.5; 325 MG/1; MG/1
1 TABLET ORAL ONCE AS NEEDED
Status: COMPLETED | OUTPATIENT
Start: 2019-02-05 | End: 2019-02-05

## 2019-02-05 RX ORDER — EPHEDRINE SULFATE 50 MG/ML
5 INJECTION, SOLUTION INTRAVENOUS ONCE AS NEEDED
Status: DISCONTINUED | OUTPATIENT
Start: 2019-02-05 | End: 2019-02-05 | Stop reason: HOSPADM

## 2019-02-05 RX ORDER — LIDOCAINE HYDROCHLORIDE 20 MG/ML
INJECTION, SOLUTION INFILTRATION; PERINEURAL AS NEEDED
Status: DISCONTINUED | OUTPATIENT
Start: 2019-02-05 | End: 2019-02-05 | Stop reason: SURG

## 2019-02-05 RX ORDER — ACETAMINOPHEN 325 MG/1
650 TABLET ORAL ONCE AS NEEDED
Status: DISCONTINUED | OUTPATIENT
Start: 2019-02-05 | End: 2019-02-05 | Stop reason: HOSPADM

## 2019-02-05 RX ORDER — HYDRALAZINE HYDROCHLORIDE 20 MG/ML
5 INJECTION INTRAMUSCULAR; INTRAVENOUS
Status: DISCONTINUED | OUTPATIENT
Start: 2019-02-05 | End: 2019-02-05 | Stop reason: HOSPADM

## 2019-02-05 RX ORDER — CEFAZOLIN SODIUM 2 G/100ML
2 INJECTION, SOLUTION INTRAVENOUS ONCE
Status: COMPLETED | OUTPATIENT
Start: 2019-02-05 | End: 2019-02-05

## 2019-02-05 RX ORDER — PROPOFOL 10 MG/ML
VIAL (ML) INTRAVENOUS AS NEEDED
Status: DISCONTINUED | OUTPATIENT
Start: 2019-02-05 | End: 2019-02-05 | Stop reason: SURG

## 2019-02-05 RX ORDER — ROCURONIUM BROMIDE 10 MG/ML
INJECTION, SOLUTION INTRAVENOUS AS NEEDED
Status: DISCONTINUED | OUTPATIENT
Start: 2019-02-05 | End: 2019-02-05 | Stop reason: SURG

## 2019-02-05 RX ORDER — EPHEDRINE SULFATE 50 MG/ML
INJECTION, SOLUTION INTRAVENOUS AS NEEDED
Status: DISCONTINUED | OUTPATIENT
Start: 2019-02-05 | End: 2019-02-05 | Stop reason: SURG

## 2019-02-05 RX ORDER — HYDROMORPHONE HYDROCHLORIDE 1 MG/ML
0.5 INJECTION, SOLUTION INTRAMUSCULAR; INTRAVENOUS; SUBCUTANEOUS
Status: DISCONTINUED | OUTPATIENT
Start: 2019-02-05 | End: 2019-02-05 | Stop reason: HOSPADM

## 2019-02-05 RX ORDER — FAMOTIDINE 10 MG/ML
20 INJECTION, SOLUTION INTRAVENOUS ONCE
Status: COMPLETED | OUTPATIENT
Start: 2019-02-05 | End: 2019-02-05

## 2019-02-05 RX ORDER — FLUMAZENIL 0.1 MG/ML
0.2 INJECTION INTRAVENOUS AS NEEDED
Status: DISCONTINUED | OUTPATIENT
Start: 2019-02-05 | End: 2019-02-05 | Stop reason: HOSPADM

## 2019-02-05 RX ADMIN — FAMOTIDINE 20 MG: 10 INJECTION INTRAVENOUS at 08:50

## 2019-02-05 RX ADMIN — LIDOCAINE HYDROCHLORIDE 100 MG: 20 INJECTION, SOLUTION INFILTRATION; PERINEURAL at 11:00

## 2019-02-05 RX ADMIN — CEFAZOLIN SODIUM 2 G: 2 INJECTION, SOLUTION INTRAVENOUS at 10:57

## 2019-02-05 RX ADMIN — MIDAZOLAM HYDROCHLORIDE 2 MG: 2 INJECTION, SOLUTION INTRAMUSCULAR; INTRAVENOUS at 08:50

## 2019-02-05 RX ADMIN — EPHEDRINE SULFATE 10 MG: 50 INJECTION INTRAMUSCULAR; INTRAVENOUS; SUBCUTANEOUS at 11:26

## 2019-02-05 RX ADMIN — ROCURONIUM BROMIDE 50 MG: 10 INJECTION INTRAVENOUS at 11:00

## 2019-02-05 RX ADMIN — PROPOFOL 180 MG: 10 INJECTION, EMULSION INTRAVENOUS at 11:00

## 2019-02-05 RX ADMIN — HYDROMORPHONE HYDROCHLORIDE 0.5 MG: 1 INJECTION, SOLUTION INTRAMUSCULAR; INTRAVENOUS; SUBCUTANEOUS at 12:45

## 2019-02-05 RX ADMIN — SUGAMMADEX 200 MG: 100 INJECTION, SOLUTION INTRAVENOUS at 11:52

## 2019-02-05 RX ADMIN — ONDANSETRON 4 MG: 2 INJECTION INTRAMUSCULAR; INTRAVENOUS at 11:50

## 2019-02-05 RX ADMIN — HYDROCODONE BITARTRATE AND ACETAMINOPHEN 1 TABLET: 7.5; 325 TABLET ORAL at 13:04

## 2019-02-05 RX ADMIN — FENTANYL CITRATE 50 MCG: 50 INJECTION INTRAMUSCULAR; INTRAVENOUS at 12:36

## 2019-02-05 RX ADMIN — FENTANYL CITRATE 50 MCG: 50 INJECTION INTRAMUSCULAR; INTRAVENOUS at 12:25

## 2019-02-05 RX ADMIN — SODIUM CHLORIDE, POTASSIUM CHLORIDE, SODIUM LACTATE AND CALCIUM CHLORIDE: 600; 310; 30; 20 INJECTION, SOLUTION INTRAVENOUS at 10:55

## 2019-02-05 RX ADMIN — FENTANYL CITRATE 50 MCG: 50 INJECTION INTRAMUSCULAR; INTRAVENOUS at 11:32

## 2019-02-05 RX ADMIN — HYDROMORPHONE HYDROCHLORIDE 0.5 MG: 1 INJECTION, SOLUTION INTRAMUSCULAR; INTRAVENOUS; SUBCUTANEOUS at 12:30

## 2019-02-05 RX ADMIN — Medication 2 MG: at 10:55

## 2019-02-05 RX ADMIN — FENTANYL CITRATE 50 MCG: 50 INJECTION INTRAMUSCULAR; INTRAVENOUS at 11:50

## 2019-02-05 RX ADMIN — SODIUM CHLORIDE, POTASSIUM CHLORIDE, SODIUM LACTATE AND CALCIUM CHLORIDE 9 ML/HR: 600; 310; 30; 20 INJECTION, SOLUTION INTRAVENOUS at 08:50

## 2019-02-05 RX ADMIN — FENTANYL CITRATE 50 MCG: 50 INJECTION INTRAMUSCULAR; INTRAVENOUS at 11:19

## 2019-02-05 RX ADMIN — FENTANYL CITRATE 50 MCG: 50 INJECTION INTRAMUSCULAR; INTRAVENOUS at 10:57

## 2019-02-05 NOTE — ANESTHESIA POSTPROCEDURE EVALUATION
Patient: Sadiq Alexander    Procedure Summary     Date:  02/05/19 Room / Location:  [facility] OR  / [facility] MAIN OR    Anesthesia Start:  1055 Anesthesia Stop:  1208    Procedure:  CHOLECYSTECTOMY LAPAROSCOPIC INTRAOPERATIVE CHOLANGIOGRAM (N/A Abdomen) Diagnosis:       Biliary dyskinesia      (Biliary dyskinesia [K82.8])    Surgeon:  Reji Hoffman MD Provider:  Jey Wilkerson MD    Anesthesia Type:  general ASA Status:  2          Anesthesia Type: general  Last vitals  BP   117/70 (02/05/19 1400)   Temp   36.5 °C (97.7 °F) (02/05/19 1315)   Pulse   74 (02/05/19 1400)   Resp   18 (02/05/19 1400)     SpO2   98 % (02/05/19 1400)     Post Anesthesia Care and Evaluation    Patient location during evaluation: PHASE II  Anesthetic complications: No anesthetic complications

## 2019-02-05 NOTE — PERIOPERATIVE NURSING NOTE
PATIENT EASILY FALLS ASLEEP, AND O2 SAT DECREASES TO 89%, AWAKENS EASILY AND EDUCATED ON COUGHING AND DEEP BREATHING AND USE OF SPLINT PILLOW

## 2019-02-05 NOTE — OP NOTE
Operative Note :   MD Sadiq Ojeda CECILIO Benjamin  1975    Procedure Date: 02/05/19    Pre-op Diagnosis:  Biliary dyskinesia [K82.8]    Post-Op Diagnosis:  Same    Procedure:   · Laparoscopic cholecystectomy with cholangiogram    Surgeon: Reji Hoffman MD    Assistant: Michael ANAYA    Anesthesia:  General (general endotracheal tube)    EBL:   minimal    Specimens:   Gallbladder and contents    Indications:  · 41 to examine with right upper quadrant pain and nausea and HIDA scan demonstrating decreased ejection fraction.    Findings:   · Normal-appearing gallbladder grossly  · Very narrow biliary tree with free spillage of contrast into the duodenum    Recommendations:   · Routine postoperative care    Description of procedure:    After obtaining informed consent, patient was brought to the operating room and placed under a general anesthetic.  His abdomen was clipped and then sterilely prepped and draped.  Supraumbilical skin incision was made and dissected through subcutaneous tissues onto the fascia.  Fascia was incised and lifted up.  #1 Vicryl suture was placed on each side of the fascia in a U stitch pattern.  The peritoneum was bluntly penetrated.  Bailey trocar was inserted and the abdomen was insufflated with CO2.  Cursory examination of the abdomen was unremarkable.  Additional 5 mm trochars were then placed, one in the epigastrium and 2 in the right upper quadrant.  Patient was in position with his head up and right side up.  The fundus of the gallbladder was grasped and retracted cephalad.  A second retractor was placed on the infundibulum and retracted laterally.  The peritoneum overlying the cystic triangle was incised and peeled down.  The cystic duct was identified and dissected out completely.  The cystic artery was identified further back in the triangle.  The remainder of the triangle was cleared out completely and the lower half of the gallbladder was mobilized off of the bed of  the liver, giving the critical view of safety.  2 clips were then placed proximally and one distally on the cystic artery.  A clip was then placed on the gallbladder side of the gallbladder cystic duct junction.  An incision was made in the cystic duct and the cholangiocatheter was introduced through the abdominal wall and then clipped into place in the cystic duct.  We shot our cholangiogram and there was immediate good filling of the duodenum.  The common bile duct and common hepatic ducts were quite narrow, but there was free spillage of contrast.  The proximal biliary radicals filled nicely and I could not appreciate any filling defects.  The intrahepatic ducts were also narrow.  The cholangiocatheter was removed and 2 clips were then placed on the common bile duct side of the cystic duct and the cystic duct was then divided between clips.  Cystic artery was divided between the previously placed clips.  The gallbladder was then removed from the bed of the liver using electrocautery.  The gallbladder was placed in an Endo Catch bag and was removed.  The abdomen was irrigated thoroughly.  The clips on the cystic duct and cystic artery stumps were inspected and appeared to be in good position.  The bed of the liver was inspected and no bleeding was seen.  The abdomen was irrigated and then suctioned.  Trochars were removed under direct visualization.  Abdomen was desufflated.  The supraumbilical fascial incision was reapproximated with #1 Vicryl suture.  Skin incisions were closed with 4-0 Monocryl.  Sterile dressings were applied and the patient was awakened and brought back to recovery in stable condition.    Reji Hoffman MD  General and Endoscopic Surgery  [facility]    [address] [place] [address], [address]  [place], [place], [address]  P: [phone]  F: [phone]

## 2019-02-05 NOTE — ANESTHESIA PREPROCEDURE EVALUATION
Anesthesia Evaluation     Patient summary reviewed and Nursing notes reviewed   no history of anesthetic complications:  NPO Solid Status: > 6 hours  NPO Liquid Status: > 2 hours           Airway   Mallampati: I  TM distance: >3 FB  Neck ROM: full  No difficulty expected  Dental - normal exam     Pulmonary - normal exam   (+) a smoker Current,   Cardiovascular - normal exam    (-) angina      Neuro/Psych  (+) headaches, psychiatric history Anxiety,     GI/Hepatic/Renal/Endo    (+)  GERD,      ROS Comment: Biliary dyskinesia    Musculoskeletal     Abdominal    Substance History      OB/GYN          Other                        Anesthesia Plan    ASA 2     general     Anesthetic plan, all risks, benefits, and alternatives have been provided, discussed and informed consent has been obtained with: patient.

## 2019-02-05 NOTE — ANESTHESIA PROCEDURE NOTES
Airway  Urgency: elective    Airway not difficult    General Information and Staff    Patient location during procedure: OR  Anesthesiologist: Jey Wilkerson MD  CRNA: Gina Stone CRNA    Indications and Patient Condition  Indications for airway management: airway protection    Preoxygenated: yes  Mask difficulty assessment: 1 - vent by mask    Final Airway Details  Final airway type: endotracheal airway      Successful airway: ETT  Cuffed: yes   Successful intubation technique: direct laryngoscopy  Facilitating devices/methods: intubating stylet  Endotracheal tube insertion site: oral  Blade: Eyal  Blade size: 4  ETT size (mm): 7.5  Cormack-Lehane Classification: grade I - full view of glottis  Placement verified by: chest auscultation and capnometry   Measured from: lips  ETT to lips (cm): 24  Number of attempts at approach: 1    Additional Comments  Preoxygenated with 100% FiO2. Smooth IV induction. Atraumatic insertion. Chip in R front tooth noted during preoperative interview. No change to dentition or soft tissue.

## 2019-02-07 LAB
CYTO UR: NORMAL
LAB AP CASE REPORT: NORMAL
PATH REPORT.FINAL DX SPEC: NORMAL
PATH REPORT.GROSS SPEC: NORMAL

## 2019-02-11 ENCOUNTER — PATIENT MESSAGE (OUTPATIENT)
Dept: INTERNAL MEDICINE | Age: 44
End: 2019-02-11

## 2019-02-11 ENCOUNTER — TELEPHONE (OUTPATIENT)
Dept: GASTROENTEROLOGY | Facility: CLINIC | Age: 44
End: 2019-02-11

## 2019-02-11 ENCOUNTER — TELEPHONE (OUTPATIENT)
Dept: INTERNAL MEDICINE | Age: 44
End: 2019-02-11

## 2019-02-11 NOTE — TELEPHONE ENCOUNTER
Send as Quick Hang Message:    Sadiq, I see that you have a follow-up with the general surgeon later this week. As long as you are fine from surgical standpoint, you can keep your appointment with me for April. Let me know if you have any questions.     Dr. Jackson

## 2019-02-11 NOTE — TELEPHONE ENCOUNTER
Regarding: Non-Urgent Medical Question  Contact: [phone]  ----- Message from [employer], Generic sent at 2/11/2019 10:49 AM EST -----    Feeling much better after gall bladder surgery, but a week out and still having the original pain in my lower right abdomen. All other pain is gone, and I am not having any problem holding down food anymore. Nausea is gone.    Pain intensifies with very deep breaths, coughs, etc.     Not sure if this is still part of the recovery or warrants additional follow up. Surgical follow up is this week on Wednesday and will mention to surgeon as well.    Thanks!

## 2019-02-11 NOTE — TELEPHONE ENCOUNTER
"Regarding: Non-Urgent Medical Question  Contact: [phone]  ----- Message from Mychart, Generic sent at 2/11/2019 10:52 AM EST -----    Misael Jackson,    Just wanted to follow up with you, had my gall bladder removed last week after a Hida scan showed my \"efraction was at 20%\" and I was really sick, and not holding down food. Doing much better now, following up with surgeon this week. Wanted to make sure I don't need to come in to see you any sooner, we do have an appointment in April but that's pretty far out.    Also I set up an appointment with a neurologist and requested a referral from you. Making sure you saw that message. Let me know if I need to resend details.     Thanks!    Sadiq  "

## 2019-02-12 NOTE — TELEPHONE ENCOUNTER
Call to pt.  Advise per Dr Allen that will await surgical f/u for the present.    Pt verb understanding -states sees surgeon tomorrow.  Will update.

## 2019-02-13 ENCOUNTER — OFFICE VISIT (OUTPATIENT)
Dept: SURGERY | Facility: CLINIC | Age: 44
End: 2019-02-13

## 2019-02-13 DIAGNOSIS — K82.8 BILIARY DYSKINESIA: Primary | ICD-10-CM

## 2019-02-13 PROCEDURE — 99024 POSTOP FOLLOW-UP VISIT: CPT | Performed by: SURGERY

## 2019-02-13 NOTE — PROGRESS NOTES
Follow-up cholecystectomy.    Subjective:  Overall feeling much better.  The bloating and nausea and upper abdominal pain he had after eating seems to be resolved.  He still does have some right-sided abdominal pain which comes and goes.  That doesn't seem to be made worse by eating.    Objective:  Abdomen is soft and benign, incisions are healed nicely.    Assessment and plan:  -Biliary dyskinesia status post cholecystectomy.  His heart to tell how much of his abdominal pain may be functional and how much is still residual after his operation.  I recommend gradual return to his normal activity.  Follow up here as needed.    Reji Hoffman MD  General and Endoscopic Surgery  [facility]    [address], [address]  [place], [place], [address]  P: [phone]  F: [phone]

## 2019-02-19 ENCOUNTER — TELEPHONE (OUTPATIENT)
Dept: GASTROENTEROLOGY | Facility: CLINIC | Age: 44
End: 2019-02-19

## 2019-02-19 RX ORDER — HYDROCORTISONE ACETATE 25 MG/1
SUPPOSITORY RECTAL
Qty: 10 SUPPOSITORY | Refills: 5 | Status: SHIPPED | OUTPATIENT
Start: 2019-02-19 | End: 2022-08-17

## 2019-02-19 NOTE — TELEPHONE ENCOUNTER
Regarding: Prescription Question  Contact: [phone]  ----- Message from [employer], Generic sent at 2/18/2019 10:59 PM EST -----    Can I get a new prescription for the suppositories for hemorrhoids. I used my last one today and they don't show up on [employer] to request a refill to my pharmacy.

## 2019-02-19 NOTE — TELEPHONE ENCOUNTER
Okay for refills on Anusol HC Suppository  Significance: 1 per rectum at bedtime as needed for hemorrhoidal pain/bleeding  Dispense: #10  Refill x5

## 2019-02-19 NOTE — TELEPHONE ENCOUNTER
Escribe for anusol supp completed per DR Allen order.      Notification to pt via [employer] re: refill.

## 2019-04-08 ENCOUNTER — OFFICE VISIT (OUTPATIENT)
Dept: INTERNAL MEDICINE | Age: 44
End: 2019-04-08

## 2019-04-08 VITALS
DIASTOLIC BLOOD PRESSURE: 64 MMHG | HEART RATE: 62 BPM | TEMPERATURE: 98.1 F | WEIGHT: 179 LBS | SYSTOLIC BLOOD PRESSURE: 110 MMHG | HEIGHT: 69 IN | BODY MASS INDEX: 26.51 KG/M2 | OXYGEN SATURATION: 98 %

## 2019-04-08 DIAGNOSIS — F41.9 ANXIETY DISORDER, UNSPECIFIED TYPE: Chronic | ICD-10-CM

## 2019-04-08 DIAGNOSIS — G43.109 MIGRAINE WITH AURA AND WITHOUT STATUS MIGRAINOSUS, NOT INTRACTABLE: Primary | Chronic | ICD-10-CM

## 2019-04-08 DIAGNOSIS — K21.00 GERD WITH ESOPHAGITIS: Chronic | ICD-10-CM

## 2019-04-08 DIAGNOSIS — K58.2 IRRITABLE BOWEL SYNDROME WITH BOTH CONSTIPATION AND DIARRHEA: Chronic | ICD-10-CM

## 2019-04-08 PROCEDURE — 99214 OFFICE O/P EST MOD 30 MIN: CPT | Performed by: INTERNAL MEDICINE

## 2019-04-08 RX ORDER — PANTOPRAZOLE SODIUM 40 MG/1
TABLET, DELAYED RELEASE ORAL
Qty: 30 TABLET | Refills: 5 | Status: SHIPPED | OUTPATIENT
Start: 2019-04-08 | End: 2019-08-18 | Stop reason: SDUPTHER

## 2019-04-08 NOTE — PROGRESS NOTES
[facility] [place] [facility]  SEJAL THACKER M.D.      Sadiq HERNANDES Alexander / 43 y.o. / male  04/08/2019      ASSESSMENT & PLAN:    Problem List Items Addressed This Visit        High    Migraines - Primary (Chronic)    Overview     Continue escitalopram 10 mg daily and zolmitriptan 5 mg as needed.          Current Assessment & Plan     Referral placed to neurologist for migraines.   Recommended increasing Trokendi XR to 100 mg daily for now.   Continue Maxalt PRN            Relevant Medications    ZOLMitriptan (ZOMIG-ZMT) 5 MG disintegrating tablet    promethazine (PHENERGAN) 25 MG tablet    escitalopram (LEXAPRO) 10 MG tablet    Topiramate ER (TROKENDI XR) 50 MG capsule sustained-release 24 hr    Other Relevant Orders    Ambulatory Referral to Neurology       Medium    Anxiety disorder (Chronic)    Overview     H/o ADHD.     Continue escitalopram 10 mg qd.          Relevant Medications    escitalopram (LEXAPRO) 10 MG tablet       Low    Irritable bowel syndrome with both constipation and diarrhea (Chronic)    Current Assessment & Plan     S/p cholecystectomy in February. Continue dietary modifications.              Orders Placed This Encounter   Procedures   • Ambulatory Referral to Neurology     No orders of the defined types were placed in this encounter.      Summary/Discussion:  ·     Return in about 4 months (around 8/8/2019) for Headaches.    ____________________________________________________________________    MEDICATIONS  Current Outpatient Medications   Medication Sig Dispense Refill   • escitalopram (LEXAPRO) 10 MG tablet TAKE 1 TABLET BY MOUTH DAILY. 30 tablet 5   • fluticasone (FLONASE) 50 MCG/ACT nasal spray 2 sprays into each nostril Daily. (Patient taking differently: 2 sprays into the nostril(s) as directed by provider Daily As Needed.)     • hydrocortisone (ANUSOL-HC) 25 MG suppository 1 per rectum at bedtime as needed for hemorrhoidal bleeding 10 suppository 5   • promethazine (PHENERGAN) 25 MG tablet Take 1  "tablet by mouth Every 8 (Eight) Hours As Needed for Nausea or Vomiting. 20 tablet 0   • rosuvastatin (CRESTOR) 10 MG tablet Take 10 mg by mouth Daily.     • Topiramate ER (TROKENDI XR) 50 MG capsule sustained-release 24 hr Take 1 capsule by mouth Daily. 30 capsule 5   • ZOLMitriptan (ZOMIG-ZMT) 5 MG disintegrating tablet Take 1 tablet by mouth 2 (Two) Times a Day As Needed for Migraine. 10 tablet 0   • pantoprazole (PROTONIX) 40 MG EC tablet TAKE 1 TABLET BY MOUTH EVERY DAY 30 tablet 5     No current facility-administered medications for this visit.           VITALS:    Visit Vitals  /64   Pulse 62   Temp 98.1 °F (36.7 °C)   Ht 175.3 cm (69.02\")   Wt 81.2 kg (179 lb)   SpO2 98%   BMI 26.42 kg/m²       BP Readings from Last 3 Encounters:   04/08/19 110/64   02/05/19 117/70   10/24/18 110/66     Wt Readings from Last 3 Encounters:   04/08/19 81.2 kg (179 lb)   02/05/19 81.3 kg (179 lb 3.2 oz)   02/04/19 81.6 kg (179 lb 12.8 oz)      Body mass index is 26.42 kg/m².    CC:  Main reason(s) for today's visit: Headache      HPI:     Complains of ongoing migraines with aura. Has not increased Trokendi XR to 100 mg as previously discussed due to concerned it may aggravate his IBS symptoms.     On escitalopram for anxiety/stress control which is a major trigger for his migraines.     IBS with diarrhea / constipation. Denies worsening symptoms.     Patient Care Team:  Charly Jackson MD as PCP - General (Internal Medicine)    ____________________________________________________________________    REVIEW OF SYSTEMS    Review of Systems  Constitutional neg  Resp neg  CV neg  GI IBS with loose bowels  Psych anxiety  Neuro migraines without other focal neuro changes     PHYSICAL EXAMINATION    Physical Exam  Constitutional  No distress  PERRL, EOMI  Cardiovascular Rate  normal . Rhythm: regular . Heart sounds:  Normal  Pulmonary/Chest  Effort normal. Breath sounds:  Normal  Psychiatric  Alert. Judgment and thought content " normal. Mood normal    REVIEWED DATA:    Labs:     Lab Results   Component Value Date     02/05/2019    K 4.2 02/05/2019    CALCIUM 9.5 02/05/2019    AST 17 02/05/2019    ALT 20 02/05/2019    BUN 14 02/05/2019    CREATININE 1.39 (H) 02/05/2019    CREATININE 1.23 06/20/2018    CREATININE 1.34 (H) 02/13/2015    EGFRIFNONA 56 (L) 02/05/2019    EGFRIFAFRI 79 06/20/2018       Lab Results   Component Value Date    HGBA1C 5.51 06/20/2018    GLUCOSE 56 (L) 02/05/2019    GLUCOSE 70 02/13/2015       Lab Results   Component Value Date    LDL 86 09/21/2018     (H) 06/20/2018    HDL 35 (L) 09/21/2018    HDL 42 06/20/2018    TRIG 102 09/21/2018    TRIG 208 (H) 06/20/2018    CHOLHDLRATIO 4.0 09/21/2018    CHOLHDLRATIO 6.14 06/20/2018       Lab Results   Component Value Date    TSH 0.867 06/20/2018    FREET4 1.10 06/20/2018       Lab Results   Component Value Date    WBC 6.83 02/05/2019    HGB 14.6 02/05/2019    HGB 15.3 06/20/2018    HGB 15.6 02/13/2015     02/05/2019       Lab Results   Component Value Date    PROTEIN Negative 06/20/2018    GLUCOSEU Negative 06/20/2018    BLOODU Negative 06/20/2018    NITRITEU Negative 06/20/2018    LEUKOCYTESUR Negative 06/20/2018       Imaging:         Medical Tests:         Summary of old records / correspondence / consultant report:         Request outside records:         ALLERGIES  No Known Allergies     PFSH:     The following portions of the patient's history were reviewed and updated as appropriate: Allergies / Current Medications / Past Medical History / Surgical History / Social History / Family History    PROBLEM LIST   Patient Active Problem List   Diagnosis   • Colon polyp   • Internal hemorrhoids   • Migraines   • Nicotine dependence   • Anxiety disorder   • Hyperlipidemia   • Irritable bowel syndrome with both constipation and diarrhea   • GERD with esophagitis       PAST MEDICAL HISTORY  Past Medical History:   Diagnosis Date   • Anxiety    • Arthritis    •  Colon polyps    • Colon polyps    • Elevated cholesterol    • GERD (gastroesophageal reflux disease)    • Hyperlipidemia    • Migraines        SURGICAL HISTORY  Past Surgical History:   Procedure Laterality Date   • CHOLECYSTECTOMY WITH INTRAOPERATIVE CHOLANGIOGRAM N/A 2/5/2019    Procedure: CHOLECYSTECTOMY LAPAROSCOPIC INTRAOPERATIVE CHOLANGIOGRAM;  Surgeon: Reji Hoffman MD;  Location: [facility];  Service: General   • COLONOSCOPY N/A 03/11/2015    Two 4-6 mm polyps at the recto-sigmoid and in the mid ascending colon (PATH: tubulovillous adenoma w/ low-grade dysplasia & hyperplastic colon polyp at 20 cm), internal hemorrhoids; Dr. Chato Allen   • REFRACTIVE SURGERY Bilateral 2018   • SHOULDER SURGERY Bilateral 2005,2015    Left Shoulder 2005 Dr. Mehta, Right Shoulder 2015 Dr. Mehta       SOCIAL HISTORY  Social History     Socioeconomic History   • Marital status: Single     Spouse name: Doris   • Number of children: 3   • Years of education: Not on file   • Highest education level: Not on file   Occupational History   • Occupation: Software consulting   Tobacco Use   • Smoking status: Current Some Day Smoker     Packs/day: 0.75     Years: 0.00     Pack years: 0.00     Start date: 3/8/2019   • Smokeless tobacco: Former User     Quit date: 3/8/2019   Substance and Sexual Activity   • Alcohol use: Yes     Alcohol/week: 1.2 oz     Types: 2 Shots of liquor per week     Frequency: 2-4 times a month     Drinks per session: 3 or 4   • Drug use: No   • Sexual activity: Yes     Partners: Female       FAMILY HISTORY  Family History   Problem Relation Age of Onset   • Melanoma Father    • Depression Father    • No Known Problems Sister    • Parkinsonism Maternal Grandmother    • Lung cancer Maternal Grandfather         smoker   • Andrew's disease Maternal Grandfather    • Lung cancer Paternal Grandmother         smoker   • Lung cancer Paternal Aunt         smoker   • Cancer Maternal Aunt         spine    • Heart attack Paternal Uncle 55   • Colon cancer Neg Hx    • Prostate cancer Neg Hx          **Sabrina Disclaimer:   Much of this encounter note is an electronic transcription/translation of spoken language to printed text. The electronic translation of spoken language may permit erroneous, or at times, nonsensical words or phrases to be inadvertently transcribed. Although I have reviewed the note for such errors, some may still exist.       Template created by Kristal Jackson MD

## 2019-04-08 NOTE — ASSESSMENT & PLAN NOTE
Referral placed to neurologist for migraines.   Recommended increasing Trokendi XR to 100 mg daily for now.   Continue Maxalt PRN

## 2019-04-13 DIAGNOSIS — G43.909 MIGRAINE WITHOUT STATUS MIGRAINOSUS, NOT INTRACTABLE, UNSPECIFIED MIGRAINE TYPE: ICD-10-CM

## 2019-04-15 RX ORDER — ZOLMITRIPTAN 5 MG/1
5 TABLET, ORALLY DISINTEGRATING ORAL 2 TIMES DAILY PRN
Qty: 10 TABLET | Refills: 3 | Status: SHIPPED | OUTPATIENT
Start: 2019-04-15 | End: 2020-03-17

## 2019-04-25 ENCOUNTER — OFFICE VISIT (OUTPATIENT)
Dept: NEUROLOGY | Facility: CLINIC | Age: 44
End: 2019-04-25

## 2019-04-25 VITALS
OXYGEN SATURATION: 98 % | BODY MASS INDEX: 26.22 KG/M2 | HEIGHT: 69 IN | WEIGHT: 177 LBS | HEART RATE: 77 BPM | DIASTOLIC BLOOD PRESSURE: 80 MMHG | SYSTOLIC BLOOD PRESSURE: 110 MMHG

## 2019-04-25 DIAGNOSIS — G43.119 INTRACTABLE MIGRAINE WITH AURA WITHOUT STATUS MIGRAINOSUS: Primary | Chronic | ICD-10-CM

## 2019-04-25 PROCEDURE — 99203 OFFICE O/P NEW LOW 30 MIN: CPT | Performed by: NURSE PRACTITIONER

## 2019-04-25 NOTE — PROGRESS NOTES
"Subjective:     Patient ID: Sadiq Alexander is a 43 y.o. male presenting for evaluation for migraine headaches. He has had migraine headaches since he was a teenager. He currently has about 2-3 migraines per week. He is taking Trokendi 50 mg daily. He has been unable to tolerate higher doses and has side effects of difficulty thinking and nausea even on the 50 mg dose. He would like to discontinue this medication. He has tried amitriptyline and immediate release topiramate in the past without improvement. His headaches typically begin with an aura described as tunnel vision or seeing \"lines\" in his vision. The headache usually begins behind one eye and then radiates over the entire head. He has photophobia, phonophobia, nausea, and vomiting with the headaches. He describes the pain as \"a chainsaw inside my skull\". He uses Zomig 5 mg dissolvable tablet which does improve his pain some but does not fully alleviate the headache. He also uses promethazine as needed for nausea.     He has a family history of migraines in his mother, sister, and grandmothers. He quit smoking last year. He drinks two cups of coffee per day. He drinks at least 8 glasses of water per day. Drinks alcohol about 3 drinks per week. Denies drug use. Denies difficulty sleeping. Denies vision changes aside from aura associated with migraine. He had Lasik surgery 2 years ago. He states he is very active. He exercises regularly. He takes Lexapro 10 mg daily for anxiety which he feels does help.     History of Present Illness  The following portions of the patient's history were reviewed and updated as appropriate: allergies, current medications, past family history, past medical history, past social history, past surgical history and problem list.    Review of Systems   Constitutional: Positive for appetite change. Negative for activity change and fatigue.   HENT: Negative for facial swelling, sinus pressure and trouble swallowing.    Eyes: Negative for " pain, redness and visual disturbance.   Respiratory: Negative for chest tightness, shortness of breath and wheezing.    Cardiovascular: Negative for chest pain, palpitations and leg swelling.   Gastrointestinal: Positive for diarrhea, nausea and vomiting.   Endocrine: Negative for cold intolerance, heat intolerance and polyphagia.   Genitourinary: Negative for difficulty urinating, frequency and urgency.   Musculoskeletal: Positive for back pain, neck pain and neck stiffness.   Neurological: Positive for headaches. Negative for dizziness, tremors, seizures, syncope, facial asymmetry, speech difficulty, weakness, light-headedness and numbness.   Hematological: Does not bruise/bleed easily.   Psychiatric/Behavioral: Negative for agitation, behavioral problems, confusion, decreased concentration, dysphoric mood, hallucinations, self-injury, sleep disturbance and suicidal ideas. The patient is not nervous/anxious and is not hyperactive.         Objective:    Neurologic Exam  General: Well nourished, well developed, and in no acute distress.  HEENT: Normocephalic/atraumatic. Mucous membranes moist. Sclerae anicteric.   Heart: Regular rate and rhythm. No murmurs, rubs or gallops.  Lungs: Clear to auscultation bilaterally.  Skin: No notable rashes or lesions on the visible surfaces.   Extremities: No clubbing, cyanosis or significant edema.   Psychiatric: Pleasant, cooperative, and appropriate.   Neurologic Exam:  Mental Status:  Alert and oriented. Speech is fluent. Comprehension is intact.   Cranial Nerves II-XII: Pupils equal, round, reactive to light. Extraocular movements are full and conjugate in all directions. Pursuit movements do not provoke any apparent dizziness or discomfort.  No nystagmus noted.  Hearing is intact to voice. Facial strength and sensation are preserved and symmetric. Tongue and palate midline. Voice non-hoarse, non-dysarthric.   Motor: Normal bulk and tone of bilateral upper and lower  extremities. Strength is 5/5 in all 4 extremities both proximally and distally. There are no abnormal or involuntary movements noted.  Sensation: Intact to light touch throughout. Romberg was negative with no significant sway.   Coordination: Fully intact. Finger-to-nose performed accurately bilaterally.  Reflexes: The deep tendon reflexes are 2+/4 in bilateral biceps, brachioradialis, triceps, patella, and Achilles.  No pathologic reflexes were noted.  Gait: Normal. No ataxia or apraxia.       Physical Exam    Assessment/Plan:     Sadiq was seen today for migraine.    Diagnoses and all orders for this visit:    Intractable migraine with aura without status migrainosus  -     Fremanezumab-vfrm (AJOVY) 225 MG/1.5ML solution prefilled syringe; Inject 1.5 mL under the skin into the appropriate area as directed Every 30 (Thirty) Days.  -     MRI Brain With & Without Contrast; Future       Migraine headaches:   1. MRI brain with and without contrast. We will call him with these results once available.   2. Will try Ajovy 225 mg once monthly. Side effects reviewed. Norbert showed to patient along and he was injected on injection sites and how to perform injection. He voiced understanding of this. Ajovy patient packet given.   3. He will discontinue Trokendi after starting Ajovy.     Follow up in 10 weeks, he will call with any problems.

## 2019-04-27 DIAGNOSIS — F41.9 ANXIETY DISORDER, UNSPECIFIED TYPE: ICD-10-CM

## 2019-04-27 DIAGNOSIS — G43.909 MIGRAINE WITHOUT STATUS MIGRAINOSUS, NOT INTRACTABLE, UNSPECIFIED MIGRAINE TYPE: ICD-10-CM

## 2019-04-29 RX ORDER — ESCITALOPRAM OXALATE 10 MG/1
10 TABLET ORAL DAILY
Qty: 30 TABLET | Refills: 5 | Status: SHIPPED | OUTPATIENT
Start: 2019-04-29 | End: 2019-11-12

## 2019-05-04 DIAGNOSIS — G43.109 MIGRAINE WITH AURA AND WITHOUT STATUS MIGRAINOSUS, NOT INTRACTABLE: Chronic | ICD-10-CM

## 2019-05-05 DIAGNOSIS — G43.109 MIGRAINE WITH AURA AND WITHOUT STATUS MIGRAINOSUS, NOT INTRACTABLE: Chronic | ICD-10-CM

## 2019-05-06 RX ORDER — TOPIRAMATE 50 MG/1
CAPSULE, EXTENDED RELEASE ORAL
Qty: 30 CAPSULE | Refills: 5 | Status: SHIPPED | OUTPATIENT
Start: 2019-05-06 | End: 2019-08-12

## 2019-05-06 RX ORDER — PROMETHAZINE HYDROCHLORIDE 25 MG/1
25 TABLET ORAL EVERY 8 HOURS PRN
Qty: 20 TABLET | Refills: 0 | OUTPATIENT
Start: 2019-05-06

## 2019-05-09 ENCOUNTER — HOSPITAL ENCOUNTER (OUTPATIENT)
Dept: MRI IMAGING | Facility: HOSPITAL | Age: 44
Discharge: HOME | End: 2019-05-09
Admitting: NURSE PRACTITIONER

## 2019-05-09 DIAGNOSIS — G43.119 INTRACTABLE MIGRAINE WITH AURA WITHOUT STATUS MIGRAINOSUS: Chronic | ICD-10-CM

## 2019-05-09 PROCEDURE — A9577 INJ MULTIHANCE: HCPCS | Performed by: NURSE PRACTITIONER

## 2019-05-09 PROCEDURE — 0 GADOBENATE DIMEGLUMINE 529 MG/ML SOLUTION: Performed by: NURSE PRACTITIONER

## 2019-05-09 PROCEDURE — 70553 MRI BRAIN STEM W/O & W/DYE: CPT

## 2019-05-09 RX ADMIN — GADOBENATE DIMEGLUMINE 17 ML: 529 INJECTION, SOLUTION INTRAVENOUS at 08:10

## 2019-05-10 ENCOUNTER — APPOINTMENT (OUTPATIENT)
Dept: MRI IMAGING | Facility: HOSPITAL | Age: 44
End: 2019-05-10

## 2019-07-09 ENCOUNTER — OFFICE VISIT (OUTPATIENT)
Dept: NEUROLOGY | Facility: CLINIC | Age: 44
End: 2019-07-09

## 2019-07-09 VITALS
SYSTOLIC BLOOD PRESSURE: 108 MMHG | HEIGHT: 69 IN | WEIGHT: 185.4 LBS | DIASTOLIC BLOOD PRESSURE: 72 MMHG | BODY MASS INDEX: 27.46 KG/M2

## 2019-07-09 DIAGNOSIS — G43.119 INTRACTABLE MIGRAINE WITH AURA WITHOUT STATUS MIGRAINOSUS: Primary | Chronic | ICD-10-CM

## 2019-07-09 DIAGNOSIS — G25.81 RESTLESS LEGS SYNDROME (RLS): ICD-10-CM

## 2019-07-09 PROCEDURE — 99213 OFFICE O/P EST LOW 20 MIN: CPT | Performed by: NURSE PRACTITIONER

## 2019-08-12 ENCOUNTER — OFFICE VISIT (OUTPATIENT)
Dept: INTERNAL MEDICINE | Age: 44
End: 2019-08-12

## 2019-08-12 VITALS
HEART RATE: 64 BPM | HEIGHT: 69 IN | SYSTOLIC BLOOD PRESSURE: 102 MMHG | BODY MASS INDEX: 26.1 KG/M2 | TEMPERATURE: 98.1 F | DIASTOLIC BLOOD PRESSURE: 76 MMHG | WEIGHT: 176.2 LBS | OXYGEN SATURATION: 97 %

## 2019-08-12 DIAGNOSIS — F41.9 ANXIETY DISORDER, UNSPECIFIED TYPE: Chronic | ICD-10-CM

## 2019-08-12 DIAGNOSIS — E78.5 HYPERLIPIDEMIA, UNSPECIFIED HYPERLIPIDEMIA TYPE: Primary | Chronic | ICD-10-CM

## 2019-08-12 DIAGNOSIS — K21.00 GERD WITH ESOPHAGITIS: Chronic | ICD-10-CM

## 2019-08-12 DIAGNOSIS — G43.119 INTRACTABLE MIGRAINE WITH AURA WITHOUT STATUS MIGRAINOSUS: Chronic | ICD-10-CM

## 2019-08-12 PROCEDURE — 99214 OFFICE O/P EST MOD 30 MIN: CPT | Performed by: INTERNAL MEDICINE

## 2019-08-12 NOTE — ASSESSMENT & PLAN NOTE
Started Ajovy by neurologist in May and doing much better. Still uses zolmitriptan 5 mg as needed about 4 times a month.

## 2019-08-12 NOTE — PATIENT INSTRUCTIONS
** IMPORTANT MESSAGE FROM DR. THACKER **    In our office, your satisfaction is VERY important to us.     You may receive a survey from [facility] by mail or E-mail for you to provide feedback about your visit. This information is invaluable for me to know what we can do to improve our services.     I ask that you please take a few minutes to complete the survey and let us know how we are doing in serving your needs. (You may receive the survey more than once for multiple visits)    Thank You !    Dr. Thacker & Staff    __________________________________________________________      ADDITIONAL INSTRUCTION / REMINDERS FROM DR. THACKER

## 2019-08-12 NOTE — PROGRESS NOTES
[facility] – [place] [facility]  SEJAL THACKER M.D.      Sadiq CECILIO Alexander / 44 y.o. / male  08/12/2019      CHIEF COMPLAINT     Headache (F/U on headache )      ASSESSMENT & PLAN     Problem List Items Addressed This Visit        High    Migraines (Chronic)    Current Assessment & Plan     Started Ajovy by neurologist in May and doing much better. Still uses zolmitriptan 5 mg as needed about 4 times a month.          Relevant Medications    promethazine (PHENERGAN) 25 MG tablet    ZOLMitriptan (ZOMIG-ZMT) 5 MG disintegrating tablet    escitalopram (LEXAPRO) 10 MG tablet    Fremanezumab-vfrm 225 MG/1.5ML solution prefilled syringe       Medium    Anxiety disorder (Chronic)    Overview     H/o ADHD.     Continue escitalopram 10 mg qd.          Relevant Medications    escitalopram (LEXAPRO) 10 MG tablet    Hyperlipidemia - Primary (Chronic)    Overview     Continue rosuvastatin 10 mg.          Relevant Medications    rosuvastatin (CRESTOR) 10 MG tablet    Other Relevant Orders    Lipid Panel With / Chol / HDL Ratio    Comprehensive Metabolic Panel       Low    GERD with esophagitis (Chronic)    Overview     Continue pantoprazole 40 mg qd.   Advised to quit smoking.          Relevant Medications    pantoprazole (PROTONIX) 40 MG EC tablet        Orders Placed This Encounter   Procedures   • Lipid Panel With / Chol / HDL Ratio   • Comprehensive Metabolic Panel     No orders of the defined types were placed in this encounter.      Summary/Discussion:  ·       Next Appointment with me: Visit date not found    Return in about 6 months (around 2/12/2020) for Hyperlipidemia, Anxiety, Headaches.      MEDICATIONS     Current Outpatient Medications   Medication Sig Dispense Refill   • escitalopram (LEXAPRO) 10 MG tablet TAKE 1 TABLET BY MOUTH DAILY. 30 tablet 5   • fluticasone (FLONASE) 50 MCG/ACT nasal spray 2 sprays into each nostril Daily. (Patient taking differently: 2 sprays into the nostril(s) as directed by provider Daily As Needed.)    "  • Fremanezumab-vfrm 225 MG/1.5ML solution prefilled syringe Inject 1.5 mL under the skin into the appropriate area as directed Every 30 (Thirty) Days. 1.5 mL 11   • hydrocortisone (ANUSOL-HC) 25 MG suppository 1 per rectum at bedtime as needed for hemorrhoidal bleeding 10 suppository 5   • pantoprazole (PROTONIX) 40 MG EC tablet TAKE 1 TABLET BY MOUTH EVERY DAY 30 tablet 5   • promethazine (PHENERGAN) 25 MG tablet Take 1 tablet by mouth Every 8 (Eight) Hours As Needed for Nausea or Vomiting. 20 tablet 0   • rosuvastatin (CRESTOR) 10 MG tablet Take 10 mg by mouth Daily.     • ZOLMitriptan (ZOMIG-ZMT) 5 MG disintegrating tablet TAKE 1 TABLET BY MOUTH 2 (TWO) TIMES A DAY AS NEEDED FOR MIGRAINE. 10 tablet 3     No current facility-administered medications for this visit.           VITAL SIGNS     Visit Vitals  /76   Pulse 64   Temp 98.1 °F (36.7 °C)   Ht 175.3 cm (69.02\")   Wt 79.9 kg (176 lb 3.2 oz)   SpO2 97%   BMI 26.01 kg/m²       BP Readings from Last 3 Encounters:   08/12/19 102/76   07/09/19 108/72   04/25/19 110/80     Wt Readings from Last 3 Encounters:   08/12/19 79.9 kg (176 lb 3.2 oz)   07/09/19 84.1 kg (185 lb 6.4 oz)   04/25/19 80.3 kg (177 lb)      Body mass index is 26.01 kg/m².      HISTORY OF PRESENT ILLNESS     Started on Ajovy injection monthly by neurologist and doing much better with migraines. Takes zolmitriptan about 4 times a month now.     Anxiety disorder: stable on escitalopram 10 mg daily. Denies problems.     On rosuvastatin for hyperlipidemia without myalgia.   Lab Results   Component Value Date    LDL 86 09/21/2018     (H) 06/20/2018    HDL 35 (L) 09/21/2018    HDL 42 06/20/2018    TRIG 102 09/21/2018    CHOLHDLRATIO 4.0 09/21/2018    CHOLHDLRATIO 6.14 06/20/2018     GERD on pantoprazole without worsening symptoms. Still smokes.       Patient Care Team:  Charly Jackson MD as PCP - General (Internal Medicine)      REVIEW OF SYSTEMS     Review of Systems  Constitutional " neg  Resp neg  CV neg  Neuro per HPI  Psych stable anxiety; no depression       PHYSICAL EXAMINATION     Physical Exam  Constitutional  No distress  Psychiatric  Alert. Judgment and thought content normal. Mood normal      REVIEWED DATA     Labs:     Lab Results   Component Value Date     02/05/2019    K 4.2 02/05/2019    CALCIUM 9.5 02/05/2019    AST 17 02/05/2019    ALT 20 02/05/2019    BUN 14 02/05/2019    CREATININE 1.39 (H) 02/05/2019    CREATININE 1.23 06/20/2018    CREATININE 1.34 (H) 02/13/2015    EGFRIFNONA 56 (L) 02/05/2019    EGFRIFAFRI 79 06/20/2018       Lab Results   Component Value Date    HGBA1C 5.51 06/20/2018     (H) 06/20/2018       Lab Results   Component Value Date    LDL 86 09/21/2018     (H) 06/20/2018    HDL 35 (L) 09/21/2018    HDL 42 06/20/2018    TRIG 102 09/21/2018    TRIG 208 (H) 06/20/2018    CHOLHDLRATIO 4.0 09/21/2018    CHOLHDLRATIO 6.14 06/20/2018       Lab Results   Component Value Date    TSH 0.867 06/20/2018    FREET4 1.10 06/20/2018       Lab Results   Component Value Date    WBC 6.83 02/05/2019    HGB 14.6 02/05/2019    HGB 15.3 06/20/2018    HGB 15.6 02/13/2015     02/05/2019       Lab Results   Component Value Date    PROTEIN Negative 06/20/2018    GLUCOSEU Negative 06/20/2018    BLOODU Negative 06/20/2018    NITRITEU Negative 06/20/2018    LEUKOCYTESUR Negative 06/20/2018       Imaging:         Medical Tests:         Summary of old records / correspondence / consultant report:   Neuro note reviewed      Request outside records:         ALLERGIES  No Known Allergies     PFSH:     The following portions of the patient's history were reviewed and updated as appropriate: Allergies / Current Medications / Past Medical History / Surgical History / Social History / Family History    PROBLEM LIST   Patient Active Problem List   Diagnosis   • Colon polyp   • Internal hemorrhoids   • Migraines   • Nicotine dependence   • Anxiety disorder   • Hyperlipidemia    • Irritable bowel syndrome with both constipation and diarrhea   • GERD with esophagitis   • Restless legs syndrome (RLS)       PAST MEDICAL HISTORY  Past Medical History:   Diagnosis Date   • Anxiety    • Arthritis    • Colon polyps    • Colon polyps    • Elevated cholesterol    • GERD (gastroesophageal reflux disease)    • Hyperlipidemia    • Migraines        SURGICAL HISTORY  Past Surgical History:   Procedure Laterality Date   • CHOLECYSTECTOMY WITH INTRAOPERATIVE CHOLANGIOGRAM N/A 2/5/2019    Procedure: CHOLECYSTECTOMY LAPAROSCOPIC INTRAOPERATIVE CHOLANGIOGRAM;  Surgeon: Reji Hoffman MD;  Location: [facility];  Service: General   • COLONOSCOPY N/A 03/11/2015    Two 4-6 mm polyps at the recto-sigmoid and in the mid ascending colon (PATH: tubulovillous adenoma w/ low-grade dysplasia & hyperplastic colon polyp at 20 cm), internal hemorrhoids; Dr. Chato Allen   • REFRACTIVE SURGERY Bilateral 2018   • SHOULDER SURGERY Bilateral 2005,2015    Left Shoulder 2005 Dr. Mehta, Right Shoulder 2015 Dr. Mehta       SOCIAL HISTORY  Social History     Socioeconomic History   • Marital status: Single     Spouse name: Doris   • Number of children: 3   • Years of education: Not on file   • Highest education level: Not on file   Occupational History   • Occupation: Software consulting   Tobacco Use   • Smoking status: Current Some Day Smoker     Packs/day: 0.75     Years: 0.00     Pack years: 0.00     Start date: 3/8/2019   • Smokeless tobacco: Former User     Quit date: 3/8/2019   Substance and Sexual Activity   • Alcohol use: Yes     Alcohol/week: 1.2 oz     Types: 2 Shots of liquor per week     Frequency: 2-4 times a month     Drinks per session: 3 or 4   • Drug use: No   • Sexual activity: Yes     Partners: Female       FAMILY HISTORY  Family History   Problem Relation Age of Onset   • Melanoma Father    • Depression Father    • No Known Problems Sister    • Parkinsonism Maternal Grandmother    • Lung  cancer Maternal Grandfather         smoker   • Bowman's disease Maternal Grandfather    • Lung cancer Paternal Grandmother         smoker   • Lung cancer Paternal Aunt         smoker   • Cancer Maternal Aunt         spine   • Heart attack Paternal Uncle 55   • Colon cancer Neg Hx    • Prostate cancer Neg Hx          **Dragon Disclaimer:   Much of this encounter note is an electronic transcription/translation of spoken language to printed text. The electronic translation of spoken language may permit erroneous, or at times, nonsensical words or phrases to be inadvertently transcribed. Although I have reviewed the note for such errors, some may still exist.       Template created by Kristal Jackson MD

## 2019-08-13 LAB
ALBUMIN SERPL-MCNC: 4.6 G/DL (ref 3.5–5.2)
ALBUMIN/GLOB SERPL: 2.7 G/DL
ALP SERPL-CCNC: 85 U/L (ref 39–117)
ALT SERPL-CCNC: 18 U/L (ref 1–41)
AST SERPL-CCNC: 20 U/L (ref 1–40)
BILIRUB SERPL-MCNC: 0.5 MG/DL (ref 0.2–1.2)
BUN SERPL-MCNC: 15 MG/DL (ref 6–20)
BUN/CREAT SERPL: 12.4 (ref 7–25)
CALCIUM SERPL-MCNC: 8.9 MG/DL (ref 8.6–10.5)
CHLORIDE SERPL-SCNC: 105 MMOL/L (ref 98–107)
CHOLEST SERPL-MCNC: 163 MG/DL (ref 0–200)
CHOLEST/HDLC SERPL: 3.98 {RATIO}
CO2 SERPL-SCNC: 22.7 MMOL/L (ref 22–29)
CREAT SERPL-MCNC: 1.21 MG/DL (ref 0.76–1.27)
GLOBULIN SER CALC-MCNC: 1.7 GM/DL
GLUCOSE SERPL-MCNC: 103 MG/DL (ref 65–99)
HDLC SERPL-MCNC: 41 MG/DL (ref 40–60)
LDLC SERPL CALC-MCNC: 99 MG/DL (ref 0–100)
POTASSIUM SERPL-SCNC: 4.4 MMOL/L (ref 3.5–5.2)
PROT SERPL-MCNC: 6.3 G/DL (ref 6–8.5)
SODIUM SERPL-SCNC: 141 MMOL/L (ref 136–145)
TRIGL SERPL-MCNC: 114 MG/DL (ref 0–150)
VLDLC SERPL CALC-MCNC: 22.8 MG/DL

## 2019-08-18 DIAGNOSIS — K21.00 GERD WITH ESOPHAGITIS: Chronic | ICD-10-CM

## 2019-08-19 RX ORDER — PANTOPRAZOLE SODIUM 40 MG/1
TABLET, DELAYED RELEASE ORAL
Qty: 30 TABLET | Refills: 5 | Status: SHIPPED | OUTPATIENT
Start: 2019-08-19 | End: 2020-04-30 | Stop reason: SDUPTHER

## 2019-09-12 ENCOUNTER — OFFICE VISIT (OUTPATIENT)
Dept: INTERNAL MEDICINE | Age: 44
End: 2019-09-12

## 2019-09-12 VITALS
DIASTOLIC BLOOD PRESSURE: 72 MMHG | BODY MASS INDEX: 28.05 KG/M2 | TEMPERATURE: 97.4 F | OXYGEN SATURATION: 97 % | SYSTOLIC BLOOD PRESSURE: 112 MMHG | HEART RATE: 66 BPM | WEIGHT: 189.4 LBS | HEIGHT: 69 IN

## 2019-09-12 DIAGNOSIS — S39.012A LUMBAR STRAIN, INITIAL ENCOUNTER: Primary | ICD-10-CM

## 2019-09-12 PROCEDURE — 99213 OFFICE O/P EST LOW 20 MIN: CPT | Performed by: INTERNAL MEDICINE

## 2019-09-12 RX ORDER — CYCLOBENZAPRINE HYDROCHLORIDE 7.5 MG/1
7.5 TABLET, FILM COATED ORAL 3 TIMES DAILY PRN
Qty: 21 TABLET | Refills: 0 | Status: SHIPPED | OUTPATIENT
Start: 2019-09-12 | End: 2019-10-18 | Stop reason: SDUPTHER

## 2019-09-12 NOTE — PROGRESS NOTES
[facility] [place] [facility]  SEJAL THACKER M.D.      Sadiq HERNANDES Alexander / 44 y.o. / male  09/12/2019      CC:  Main reason(s) for today's visit: Back Pain (sprained back x 2 mo, has been to PT, possibly aggravated x 4-5 days ago from standing long period of time; constant pain, worsens with ambulation)      HPI:     Reviewed chief complaint and details of complaint as documented by staff.     Left lower back strain. Did baseball practice with son's team day before. No sciatic symptoms. MRI lumbar 2017 DDD.       Patient Care Team:  Charly Thacker MD as PCP - General (Internal Medicine)    ASSESSMENT & PLAN:    1. Lumbar strain, initial encounter      No orders of the defined types were placed in this encounter.    New Medications Ordered This Visit   Medications   • cyclobenzaprine (FEXMID) 7.5 MG tablet     Sig: Take 1 tablet by mouth 3 (Three) Times a Day As Needed for Muscle Spasms.     Dispense:  21 tablet     Refill:  0        Summary/Discussion:  Continue naproxen 2 BID OTC, may use Tylenol PRN  Use moist heat  Continue stretching for low back pain, discussed ergonomics  If not improving by next week let me know       Next Appointment with me: 2/12/2020    Return for worsening problem or if not improving.    ____________________________________________________________________    MEDICATIONS  Current Outpatient Medications   Medication Sig Dispense Refill   • escitalopram (LEXAPRO) 10 MG tablet TAKE 1 TABLET BY MOUTH DAILY. 30 tablet 5   • fluticasone (FLONASE) 50 MCG/ACT nasal spray 2 sprays into each nostril Daily. (Patient taking differently: 2 sprays into the nostril(s) as directed by provider Daily As Needed.)     • Fremanezumab-vfrm 225 MG/1.5ML solution prefilled syringe Inject 1.5 mL under the skin into the appropriate area as directed Every 30 (Thirty) Days. 1.5 mL 11   • hydrocortisone (ANUSOL-HC) 25 MG suppository 1 per rectum at bedtime as needed for hemorrhoidal bleeding 10 suppository 5   • pantoprazole  "(PROTONIX) 40 MG EC tablet TAKE 1 TABLET BY MOUTH EVERY DAY 30 tablet 5   • promethazine (PHENERGAN) 25 MG tablet Take 1 tablet by mouth Every 8 (Eight) Hours As Needed for Nausea or Vomiting. 20 tablet 0   • rosuvastatin (CRESTOR) 10 MG tablet Take 10 mg by mouth Daily.     • ZOLMitriptan (ZOMIG-ZMT) 5 MG disintegrating tablet TAKE 1 TABLET BY MOUTH 2 (TWO) TIMES A DAY AS NEEDED FOR MIGRAINE. 10 tablet 3         ____________________________________________________________________      REVIEW OF SYSTEMS    Review of Systems  GI neg    neg     VITALS    Visit Vitals  /72   Pulse 66   Temp 97.4 °F (36.3 °C)   Ht 175.3 cm (69\")   Wt 85.9 kg (189 lb 6.4 oz)   SpO2 97%   BMI 27.97 kg/m²       BP Readings from Last 3 Encounters:   09/12/19 112/72   08/12/19 102/76   07/09/19 108/72     Wt Readings from Last 3 Encounters:   09/12/19 85.9 kg (189 lb 6.4 oz)   08/12/19 79.9 kg (176 lb 3.2 oz)   07/09/19 84.1 kg (185 lb 6.4 oz)      Body mass index is 27.97 kg/m².    PHYSICAL EXAMINATION    Physical Exam   Constitutional: No distress.   Appears uncomfortable from back pain   Musculoskeletal:        Back:            REVIEWED DATA:    Labs:       Imaging:      MRI OF THE LUMBAR SPINE WITHOUT CONTRAST     HISTORY:  Low back pain radiating down into both hips for 20 years.     MRI of the lumbar spine was obtained with sagittal T1, proton density,  and T2-weighted images. Additionally, there are axial T1 and T2-weighted  images through the lumbar spine.     FINDINGS:     There is loss of the usual lordotic curvature of the lumbar spine.  Minimal levoconvex scoliotic curvature of the lumbar spine is seen with  its apex centered at L4.     At L1-2, there is no significant degree of canal or foraminal stenosis.   There is mild facet arthropathy. There is a disc osteophyte complex.  Some degenerative disc changes along with adjacent degenerative end  plate marrow changes are noted.     At L2-3, mild facet arthritic changes are " noted. However, there is no  significant degree of canal or foraminal stenosis.     AT L3-4, there is mild-to-moderate facet arthropathy. There is mild disc  bulging. Mild foraminal narrowing is noted. Minimal canal stenosis is  appreciated.     At L4-5, there is minimal disc bulging. Annular tear is noted. There is  a mild degree of left foraminal narrowing secondary to disc bulging.  Mild-to-moderate facet arthropathy is seen.     At L5-S1, there is moderate facet arthritic change. Synovial cysts are  seen arising from the left facet joint which project laterally into the  posterior paravertebral soft tissues. Mild foraminal narrowing is seen  secondary to disc bulging.     The conus medullaris terminates at the L1-2 level and has normal signal  intensity.     IMPRESSION:  1.  There is loss of the usual lordotic curvature of the lumbar spine.  Minimal levoconvex scoliotic curvature of the lumbar spine is seen with  its apex centered at L4.  2.  Relatively mild foraminal narrowing is identified within the lower  lumbar spine from L3-4 down to L5-S1.     This report was finalized on 1/26/2017   Medical Tests:       Summary of old records / correspondence / consultant report:        Request outside records:       ALLERGIES  No Known Allergies     PFSH:     The following portions of the patient's history were reviewed and updated as appropriate: Allergies / Current Medications / Past Medical History / Surgical History / Social History / Family History    PROBLEM LIST   Patient Active Problem List   Diagnosis   • Colon polyp   • Internal hemorrhoids   • Migraines   • Nicotine dependence   • Anxiety disorder   • Hyperlipidemia   • Irritable bowel syndrome with both constipation and diarrhea   • GERD with esophagitis   • Restless legs syndrome (RLS)       PAST MEDICAL HISTORY  Past Medical History:   Diagnosis Date   • Anxiety    • Arthritis    • Colon polyps    • Colon polyps    • Elevated cholesterol    • GERD  (gastroesophageal reflux disease)    • Hyperlipidemia    • Migraines        SURGICAL HISTORY  Past Surgical History:   Procedure Laterality Date   • CHOLECYSTECTOMY WITH INTRAOPERATIVE CHOLANGIOGRAM N/A 2/5/2019    Procedure: CHOLECYSTECTOMY LAPAROSCOPIC INTRAOPERATIVE CHOLANGIOGRAM;  Surgeon: Reji Hoffman MD;  Location: [facility];  Service: General   • COLONOSCOPY N/A 03/11/2015    Two 4-6 mm polyps at the recto-sigmoid and in the mid ascending colon (PATH: tubulovillous adenoma w/ low-grade dysplasia & hyperplastic colon polyp at 20 cm), internal hemorrhoids; Dr. Chato Allen   • REFRACTIVE SURGERY Bilateral 2018   • SHOULDER SURGERY Bilateral 2005,2015    Left Shoulder 2005 Dr. Mehta, Right Shoulder 2015 Dr. Mehta       SOCIAL HISTORY  Social History     Socioeconomic History   • Marital status: Single     Spouse name: Doris   • Number of children: 3   • Years of education: Not on file   • Highest education level: Not on file   Occupational History   • Occupation: Software consulting   Tobacco Use   • Smoking status: Current Some Day Smoker     Packs/day: 0.75     Years: 0.00     Pack years: 0.00     Start date: 3/8/2019   • Smokeless tobacco: Former User     Quit date: 3/8/2019   • Tobacco comment: when he drinks   Substance and Sexual Activity   • Alcohol use: Yes     Alcohol/week: 1.2 oz     Types: 2 Shots of liquor per week     Frequency: 2-4 times a month     Drinks per session: 3 or 4   • Drug use: No   • Sexual activity: Yes     Partners: Female       FAMILY HISTORY  Family History   Problem Relation Age of Onset   • Melanoma Father    • Depression Father    • No Known Problems Sister    • Parkinsonism Maternal Grandmother    • Lung cancer Maternal Grandfather         smoker   • [place]'s disease Maternal Grandfather    • Lung cancer Paternal Grandmother         smoker   • Lung cancer Paternal Aunt         smoker   • Cancer Maternal Aunt         spine   • Heart attack Paternal Uncle  55   • Colon cancer Neg Hx    • Prostate cancer Neg Hx          **Sabrina Disclaimer:   Much of this encounter note is an electronic transcription/translation of spoken language to printed text. The electronic translation of spoken language may permit erroneous, or at times, nonsensical words or phrases to be inadvertently transcribed. Although I have reviewed the note for such errors, some may still exist.       Template created by Kristal Jackson MD

## 2019-10-17 NOTE — PROGRESS NOTES
[facility] [place] [facility]  CIERA Garcia Alexander / 44 y.o. / male  10/18/2019      ASSESSMENT & PLAN:    Problem List Items Addressed This Visit     None      Visit Diagnoses     Strain of lumbar region, initial encounter    -  Primary    Relevant Medications    methylPREDNISolone acetate (DEPO-medrol) injection 80 mg (Completed)    methylPREDNISolone (MEDROL, LUX,) 4 MG tablet    cyclobenzaprine (FLEXERIL) 10 MG tablet        No orders of the defined types were placed in this encounter.    New Medications Ordered This Visit   Medications   • methylPREDNISolone acetate (DEPO-medrol) injection 80 mg   • methylPREDNISolone (MEDROL, LUX,) 4 MG tablet     Sig: Take as directed on package instructions.     Dispense:  21 each     Refill:  0   • cyclobenzaprine (FLEXERIL) 10 MG tablet     Sig: Take 1 tablet by mouth 2 (Two) Times a Day As Needed for Muscle Spasms.     Dispense:  15 tablet     Refill:  0       Summary/Discussion:  1. Strain of lumbar region, initial encounter  - R lumbar strain. See orders. Advised patient that car travel will likely exacerbate pain and stiffness and encouraged frequent breaks and stretching/mobility. Cotninue with PT when he returns home.   - methylPREDNISolone acetate (DEPO-medrol) injection 80 mg  - methylPREDNISolone (MEDROL, LUX,) 4 MG tablet; Take as directed on package instructions.  Dispense: 21 each; Refill: 0  - cyclobenzaprine (FLEXERIL) 10 MG tablet; Take 1 tablet by mouth 2 (Two) Times a Day As Needed for Muscle Spasms.  Dispense: 15 tablet; Refill: 0    Spent 25 minutes in face-to-face encounter time and >50% of time spent in counseling regarding above medical problems/issues.     I counseled the patient on the following items regarding their musculoskeletal pain:    1. Perform physical therapy exercises at home as prescribed by physical therapy or from the clinic handout (given to the patient).  Continuation of these exercises every day, or multiple times per  week, even when the patient has good pain relief, was stressed to the patient as a preventative measure to decrease the frequency and severity of future pain episodes.  2.  Continue pain medicines as already prescribed.  If patient not currently taking any, it is recommended to begin Acetaminophen 1000 mg po q 8 hours.  If other medicines containing Acetaminophen are currently prescribed, maintain daily dose at 3000mg or less.    3.  If they can tolerate NSAIDS, it is recommended to take Ibuprofen 600 mg po q 6 hours for 7 days during pain exacerbations.   Alternatively, they may substitute an NSAID of their choice (e.g. Aleve).  4.  Heat and ice to the affected area as tolerated for pain control.  It was discussed that heating pads can cause burns. Only apply these for 20 minutes at a time.  5.  Low impact exercise such as walking, biking, or water exercise was recommended to maintain overall health and aid in weight control.   6.  Patient was counseled to abstain from tobacco products.    Return if symptoms worsen or fail to improve, for Next scheduled follow up.  ____________________________________________________________________    MEDICATIONS  Current Outpatient Medications   Medication Sig Dispense Refill   • escitalopram (LEXAPRO) 10 MG tablet TAKE 1 TABLET BY MOUTH DAILY. 30 tablet 5   • fluticasone (FLONASE) 50 MCG/ACT nasal spray 2 sprays into each nostril Daily. (Patient taking differently: 2 sprays into the nostril(s) as directed by provider Daily As Needed.)     • Fremanezumab-vfrm 225 MG/1.5ML solution prefilled syringe Inject 1.5 mL under the skin into the appropriate area as directed Every 30 (Thirty) Days. 1.5 mL 11   • HYDROcodone-acetaminophen (NORCO) 5-325 MG per tablet Take 1 tablet by mouth.     • hydrocortisone (ANUSOL-HC) 25 MG suppository 1 per rectum at bedtime as needed for hemorrhoidal bleeding 10 suppository 5   • pantoprazole (PROTONIX) 40 MG EC tablet TAKE 1 TABLET BY MOUTH EVERY DAY  "30 tablet 5   • promethazine (PHENERGAN) 25 MG tablet Take 1 tablet by mouth Every 8 (Eight) Hours As Needed for Nausea or Vomiting. 20 tablet 0   • rosuvastatin (CRESTOR) 10 MG tablet Take 10 mg by mouth Daily.     • ZOLMitriptan (ZOMIG-ZMT) 5 MG disintegrating tablet TAKE 1 TABLET BY MOUTH 2 (TWO) TIMES A DAY AS NEEDED FOR MIGRAINE. 10 tablet 3   • cyclobenzaprine (FLEXERIL) 10 MG tablet Take 1 tablet by mouth 2 (Two) Times a Day As Needed for Muscle Spasms. 15 tablet 0   • methylPREDNISolone (MEDROL, LUX,) 4 MG tablet Take as directed on package instructions. 21 each 0     No current facility-administered medications for this visit.        VITALS    Visit Vitals  /80   Pulse (!) 121   Temp 96.7 °F (35.9 °C) (Temporal)   Ht 175.3 cm (69.02\")   Wt 87.1 kg (192 lb)   SpO2 96%   BMI 28.34 kg/m²       BP Readings from Last 3 Encounters:   10/18/19 124/80   09/12/19 112/72   08/12/19 102/76     Wt Readings from Last 3 Encounters:   10/18/19 87.1 kg (192 lb)   09/12/19 85.9 kg (189 lb 6.4 oz)   08/12/19 79.9 kg (176 lb 3.2 oz)      Body mass index is 28.34 kg/m².    CC:  Main reason(s) for today's visit: Back pain    HPI:   Back pain: This is a chronic issue, due to history of trauma to the lumbar spine greater than 5 years ago.  He is under regular treatment with physical therapy weekly or as needed for acute flareups.  This time he has no known traumatic cause or injury and is having muscle spasms on his right lumbar spine.  Usually his pain is located on the left lumbar spine, but this time spasms are On the right.  .  He has not taken anything over-the-counter for the pain.  He states he has been in acute pain since last Thursday, that began when he was standing without lifting twisting or bending in his kitchen.  He was evaluated initially in the ER, treated with muscle relaxers and pain medication and sent home to follow-up with his PCP.  He denies radicular pain from the right lumbar spine, however his " chronic left lower back pain does have some radicular symptoms into his left buttock her left leg.  His range of motion is fully intact, although tentative.  Observed gait is normal and equally balance.  There is mild palpable tenderness on the right lumbar spine.  He has done one episode of physical therapy this past week since his ER visit, that caused further right-sided muscle spasms, with increased pain and impaired mobility.  He plans to continue with his physical therapist after acute pain calms down.  He does have plans for a long car trip this weekend, and is worried this will make his pain worse.    Patient Care Team:  Charly Jackson MD as PCP - General (Internal Medicine)  ____________________________________________________________________      REVIEW OF SYSTEMS    Review of Systems  As noted per HPI  Constitutional neg  Resp neg  CV neg     PHYSICAL EXAMINATION    Physical Exam   Musculoskeletal: He exhibits tenderness (mild R lumbar spine). He exhibits no edema.        Lumbar back: He exhibits spasm (reported, R side). He exhibits normal range of motion (tentative, but intact) and no swelling.     Constitutional  No distress  Cardiovascular Rate  normal . Rhythm: regular . Heart sounds:  Normal  Pulmonary/Chest: Effort normal and breath sounds normal.    Psychiatric  Alert. Judgment and thought content normal. Mood normal    REVIEWED DATA:    Labs:   Lab Results   Component Value Date     08/12/2019    K 4.4 08/12/2019    AST 20 08/12/2019    ALT 18 08/12/2019    BUN 15 08/12/2019    CREATININE 1.21 08/12/2019    CREATININE 1.39 (H) 02/05/2019    CREATININE 1.23 06/20/2018    EGFRIFNONA 65 08/12/2019    EGFRIFAFRI 79 08/12/2019       Lab Results   Component Value Date    HGBA1C 5.51 06/20/2018    GLUCOSE 56 (L) 02/05/2019    GLUCOSE 70 02/13/2015       Lab Results   Component Value Date    LDL 99 08/12/2019    LDL 86 09/21/2018     (H) 06/20/2018    HDL 41 08/12/2019    HDL 35 (L) 09/21/2018     HDL 42 06/20/2018    TRIG 114 08/12/2019    TRIG 102 09/21/2018    TRIG 208 (H) 06/20/2018    CHOLHDLRATIO 3.98 08/12/2019    CHOLHDLRATIO 4.0 09/21/2018    CHOLHDLRATIO 6.14 06/20/2018       Lab Results   Component Value Date    TSH 0.867 06/20/2018    FREET4 1.10 06/20/2018          Lab Results   Component Value Date    WBC 6.83 02/05/2019    HGB 14.6 02/05/2019    HGB 15.3 06/20/2018    HGB 15.6 02/13/2015     02/05/2019       Lab Results   Component Value Date    PROTEIN Negative 06/20/2018    GLUCOSEU Negative 06/20/2018    BLOODU Negative 06/20/2018    NITRITEU Negative 06/20/2018    LEUKOCYTESUR Negative 06/20/2018       Imaging:        Medical Tests:        Summary of old records / correspondence / consultant report:        Request outside records:        ALLERGIES  No Known Allergies     PFSH:     The following portions of the patient's history were reviewed and updated as appropriate: Allergies / Current Medications / Past Medical History / Surgical History / Social History / Family History    PROBLEM LIST   Patient Active Problem List   Diagnosis   • Colon polyp   • Internal hemorrhoids   • Migraines   • Nicotine dependence   • Anxiety disorder   • Hyperlipidemia   • Irritable bowel syndrome with both constipation and diarrhea   • GERD with esophagitis   • Restless legs syndrome (RLS)       PAST MEDICAL HISTORY  Past Medical History:   Diagnosis Date   • Anxiety    • Arthritis    • Colon polyps    • Colon polyps    • Elevated cholesterol    • GERD (gastroesophageal reflux disease)    • Hyperlipidemia    • Migraines        SURGICAL HISTORY  Past Surgical History:   Procedure Laterality Date   • CHOLECYSTECTOMY WITH INTRAOPERATIVE CHOLANGIOGRAM N/A 2/5/2019    Procedure: CHOLECYSTECTOMY LAPAROSCOPIC INTRAOPERATIVE CHOLANGIOGRAM;  Surgeon: Reji Hoffman MD;  Location: [facility] OR;  Service: General   • COLONOSCOPY N/A 03/11/2015    Two 4-6 mm polyps at the recto-sigmoid and in the mid  ascending colon (PATH: tubulovillous adenoma w/ low-grade dysplasia & hyperplastic colon polyp at 20 cm), internal hemorrhoids; Dr. Chato Allen   • REFRACTIVE SURGERY Bilateral 2018   • SHOULDER SURGERY Bilateral 2005,2015    Left Shoulder 2005 Dr. Mehta, Right Shoulder 2015 Dr. Mehta       SOCIAL HISTORY  Social History     Socioeconomic History   • Marital status: Single     Spouse name: Doris   • Number of children: 3   • Years of education: Not on file   • Highest education level: Not on file   Occupational History   • Occupation: Software consulting   Tobacco Use   • Smoking status: Current Some Day Smoker     Packs/day: 0.75     Years: 0.00     Pack years: 0.00     Start date: 3/8/2019   • Smokeless tobacco: Former User     Quit date: 3/8/2019   • Tobacco comment: when he drinks   Substance and Sexual Activity   • Alcohol use: Yes     Alcohol/week: 1.2 oz     Types: 2 Shots of liquor per week     Frequency: 2-4 times a month     Drinks per session: 3 or 4   • Drug use: No   • Sexual activity: Yes     Partners: Female       FAMILY HISTORY  Family History   Problem Relation Age of Onset   • Melanoma Father    • Depression Father    • No Known Problems Sister    • Parkinsonism Maternal Grandmother    • Lung cancer Maternal Grandfather         smoker   • [place]'s disease Maternal Grandfather    • Lung cancer Paternal Grandmother         smoker   • Lung cancer Paternal Aunt         smoker   • Cancer Maternal Aunt         spine   • Heart attack Paternal Uncle 55   • Colon cancer Neg Hx    • Prostate cancer Neg Hx

## 2019-10-18 ENCOUNTER — OFFICE VISIT (OUTPATIENT)
Dept: INTERNAL MEDICINE | Age: 44
End: 2019-10-18

## 2019-10-18 VITALS
BODY MASS INDEX: 28.44 KG/M2 | SYSTOLIC BLOOD PRESSURE: 124 MMHG | WEIGHT: 192 LBS | TEMPERATURE: 96.7 F | OXYGEN SATURATION: 96 % | HEART RATE: 121 BPM | HEIGHT: 69 IN | DIASTOLIC BLOOD PRESSURE: 80 MMHG

## 2019-10-18 DIAGNOSIS — S39.012A STRAIN OF LUMBAR REGION, INITIAL ENCOUNTER: Primary | ICD-10-CM

## 2019-10-18 PROCEDURE — 99214 OFFICE O/P EST MOD 30 MIN: CPT | Performed by: NURSE PRACTITIONER

## 2019-10-18 PROCEDURE — 96372 THER/PROPH/DIAG INJ SC/IM: CPT | Performed by: NURSE PRACTITIONER

## 2019-10-18 RX ORDER — HYDROCODONE BITARTRATE AND ACETAMINOPHEN 5; 325 MG/1; MG/1
1 TABLET ORAL
COMMUNITY
Start: 2019-10-10 | End: 2019-10-29

## 2019-10-18 RX ORDER — CYCLOBENZAPRINE HCL 10 MG
10 TABLET ORAL 2 TIMES DAILY PRN
Qty: 15 TABLET | Refills: 0 | Status: SHIPPED | OUTPATIENT
Start: 2019-10-18 | End: 2019-11-12

## 2019-10-18 RX ORDER — METHYLPREDNISOLONE 4 MG/1
TABLET ORAL
Qty: 21 EACH | Refills: 0 | Status: SHIPPED | OUTPATIENT
Start: 2019-10-18 | End: 2019-10-29

## 2019-10-18 RX ORDER — METHYLPREDNISOLONE ACETATE 80 MG/ML
80 INJECTION, SUSPENSION INTRA-ARTICULAR; INTRALESIONAL; INTRAMUSCULAR; SOFT TISSUE ONCE
Status: COMPLETED | OUTPATIENT
Start: 2019-10-18 | End: 2019-10-18

## 2019-10-18 RX ADMIN — METHYLPREDNISOLONE ACETATE 80 MG: 80 INJECTION, SUSPENSION INTRA-ARTICULAR; INTRALESIONAL; INTRAMUSCULAR; SOFT TISSUE at 16:01

## 2019-10-28 ENCOUNTER — TELEPHONE (OUTPATIENT)
Dept: INTERNAL MEDICINE | Age: 44
End: 2019-10-28

## 2019-10-28 NOTE — TELEPHONE ENCOUNTER
If not improving, have him come back in for re-evaluation. Will need this prior to his insurance approving an MRI.   He may see me.

## 2019-10-28 NOTE — TELEPHONE ENCOUNTER
Regarding: Referral Request  Contact: [phone]  ----- Message from Zhilian Zhaopin, Generic sent at 10/28/2019  1:23 PM EDT -----    Dr. Jackson,    I've been doing PT for my back and my PT thinks an updated MRI would be helpful. Can you help get this scheduled?

## 2019-10-29 ENCOUNTER — OFFICE VISIT (OUTPATIENT)
Dept: INTERNAL MEDICINE | Age: 44
End: 2019-10-29

## 2019-10-29 VITALS
OXYGEN SATURATION: 98 % | BODY MASS INDEX: 28.44 KG/M2 | TEMPERATURE: 96.9 F | WEIGHT: 192 LBS | DIASTOLIC BLOOD PRESSURE: 78 MMHG | HEART RATE: 96 BPM | HEIGHT: 69 IN | SYSTOLIC BLOOD PRESSURE: 118 MMHG

## 2019-10-29 DIAGNOSIS — M62.830 LUMBAR PARASPINAL MUSCLE SPASM: Primary | ICD-10-CM

## 2019-10-29 DIAGNOSIS — Z23 ENCOUNTER FOR IMMUNIZATION: ICD-10-CM

## 2019-10-29 DIAGNOSIS — M47.816 OSTEOARTHRITIS OF LUMBAR SPINE, UNSPECIFIED SPINAL OSTEOARTHRITIS COMPLICATION STATUS: ICD-10-CM

## 2019-10-29 PROCEDURE — 90674 CCIIV4 VAC NO PRSV 0.5 ML IM: CPT | Performed by: INTERNAL MEDICINE

## 2019-10-29 PROCEDURE — 90471 IMMUNIZATION ADMIN: CPT | Performed by: INTERNAL MEDICINE

## 2019-10-29 PROCEDURE — 99213 OFFICE O/P EST LOW 20 MIN: CPT | Performed by: INTERNAL MEDICINE

## 2019-10-29 NOTE — PROGRESS NOTES
[facility] [place] [facility] MEDICINE  SEJAL THACKER M.D.      Sadiq HERNANDES Alexander / 44 y.o. / male  10/29/2019      CC:  Main reason(s) for today's visit: Back Pain (need MRI)      HPI:     Reviewed chief complaint and details of complaint as documented by staff.     Has been seeing his usual physical therapist for paralumbar back pain. Slowing improving but progress has been slow. His PT suggested getting another MRI of the spine (prior MRI findings noted below). Saw APRN who prescribed another round of steroids and cyclobenzaprine which seemed to help somewhat. Denies radicular symptoms. He was prescribed HC5 by ED but has not been taking it.       Patient Care Team:  Charly Thacker MD as PCP - General (Internal Medicine)    ASSESSMENT & PLAN:    1. Lumbar paraspinal muscle spasm    2. Osteoarthritis of lumbar spine, unspecified spinal osteoarthritis complication status    3. Encounter for immunization      Orders Placed This Encounter   Procedures   • Flucelvax Quad=>4Years ([id number])   • Ambulatory Referral to Pain Management     No orders of the defined types were placed in this encounter.       Summary/Discussion:  Continue cyclobenzaprine PRN for muscle spasm. Heat, stretch etc. Discussed ergonomics. May continue with PT.   Referral to pain mgmt for trigger point injections possible facet injections. May need updated MRI.   OTC NSAIDS PRN.   Advised to not use hydrocodone.       Next Appointment with me: 11/12/2019    Return in about 2 weeks (around 11/12/2019) for Reassess today's problem(s).    ____________________________________________________________________    MEDICATIONS  Current Outpatient Medications   Medication Sig Dispense Refill   • cyclobenzaprine (FLEXERIL) 10 MG tablet Take 1 tablet by mouth 2 (Two) Times a Day As Needed for Muscle Spasms. 15 tablet 0   • escitalopram (LEXAPRO) 10 MG tablet TAKE 1 TABLET BY MOUTH DAILY. 30 tablet 5   • fluticasone (FLONASE) 50 MCG/ACT nasal spray 2 sprays into each nostril Daily.  "(Patient taking differently: 2 sprays into the nostril(s) as directed by provider Daily As Needed.)     • Fremanezumab-vfrm 225 MG/1.5ML solution prefilled syringe Inject 1.5 mL under the skin into the appropriate area as directed Every 30 (Thirty) Days. 1.5 mL 11   • hydrocortisone (ANUSOL-HC) 25 MG suppository 1 per rectum at bedtime as needed for hemorrhoidal bleeding 10 suppository 5   • pantoprazole (PROTONIX) 40 MG EC tablet TAKE 1 TABLET BY MOUTH EVERY DAY 30 tablet 5   • promethazine (PHENERGAN) 25 MG tablet Take 1 tablet by mouth Every 8 (Eight) Hours As Needed for Nausea or Vomiting. 20 tablet 0   • rosuvastatin (CRESTOR) 10 MG tablet Take 10 mg by mouth Daily.     • ZOLMitriptan (ZOMIG-ZMT) 5 MG disintegrating tablet TAKE 1 TABLET BY MOUTH 2 (TWO) TIMES A DAY AS NEEDED FOR MIGRAINE. 10 tablet 3     No current facility-administered medications for this visit.           ____________________________________________________________________      REVIEW OF SYSTEMS    Review of Systems  No fever or chills  GI neg   neg  No sciatic pain   Psych stable mood on medication     VITALS    Visit Vitals  /78   Pulse 96   Temp 96.9 °F (36.1 °C)   Ht 175.3 cm (69.02\")   Wt 87.1 kg (192 lb)   SpO2 98%   BMI 28.34 kg/m²       BP Readings from Last 3 Encounters:   10/29/19 118/78   10/18/19 124/80   09/12/19 112/72     Wt Readings from Last 3 Encounters:   10/29/19 87.1 kg (192 lb)   10/18/19 87.1 kg (192 lb)   09/12/19 85.9 kg (189 lb 6.4 oz)      Body mass index is 28.34 kg/m².    PHYSICAL EXAMINATION    Physical Exam   Constitutional: No distress.   Musculoskeletal:        Back:            REVIEWED DATA:    Labs:       Imaging:      MRI OF THE LUMBAR SPINE WITHOUT CONTRAST     HISTORY:  Low back pain radiating down into both hips for 20 years.     MRI of the lumbar spine was obtained with sagittal T1, proton density,  and T2-weighted images. Additionally, there are axial T1 and T2-weighted  images through the " lumbar spine.     FINDINGS:     There is loss of the usual lordotic curvature of the lumbar spine.  Minimal levoconvex scoliotic curvature of the lumbar spine is seen with  its apex centered at L4.     At L1-2, there is no significant degree of canal or foraminal stenosis.   There is mild facet arthropathy. There is a disc osteophyte complex.  Some degenerative disc changes along with adjacent degenerative end  plate marrow changes are noted.     At L2-3, mild facet arthritic changes are noted. However, there is no  significant degree of canal or foraminal stenosis.     AT L3-4, there is mild-to-moderate facet arthropathy. There is mild disc  bulging. Mild foraminal narrowing is noted. Minimal canal stenosis is  appreciated.     At L4-5, there is minimal disc bulging. Annular tear is noted. There is  a mild degree of left foraminal narrowing secondary to disc bulging.  Mild-to-moderate facet arthropathy is seen.     At L5-S1, there is moderate facet arthritic change. Synovial cysts are  seen arising from the left facet joint which project laterally into the  posterior paravertebral soft tissues. Mild foraminal narrowing is seen  secondary to disc bulging.     The conus medullaris terminates at the L1-2 level and has normal signal  intensity.     IMPRESSION:  1.  There is loss of the usual lordotic curvature of the lumbar spine.  Minimal levoconvex scoliotic curvature of the lumbar spine is seen with  its apex centered at L4.  2.  Relatively mild foraminal narrowing is identified within the lower  lumbar spine from L3-4 down to L5-S1.     This report was finalized on 1/26/2017    Medical Tests:       Summary of old records / correspondence / consultant report:        Request outside records:       ALLERGIES  No Known Allergies     PFSH:     The following portions of the patient's history were reviewed and updated as appropriate: Allergies / Current Medications / Past Medical History / Surgical History / Social  History / Family History    PROBLEM LIST   Patient Active Problem List   Diagnosis   • Colon polyp   • Internal hemorrhoids   • Migraines   • Nicotine dependence   • Anxiety disorder   • Hyperlipidemia   • Irritable bowel syndrome with both constipation and diarrhea   • GERD with esophagitis   • Restless legs syndrome (RLS)       PAST MEDICAL HISTORY  Past Medical History:   Diagnosis Date   • Anxiety    • Arthritis    • Colon polyps    • Colon polyps    • Elevated cholesterol    • GERD (gastroesophageal reflux disease)    • Hyperlipidemia    • Migraines        SURGICAL HISTORY  Past Surgical History:   Procedure Laterality Date   • CHOLECYSTECTOMY WITH INTRAOPERATIVE CHOLANGIOGRAM N/A 2/5/2019    Procedure: CHOLECYSTECTOMY LAPAROSCOPIC INTRAOPERATIVE CHOLANGIOGRAM;  Surgeon: Reji Hoffman MD;  Location: [facility] OR;  Service: General   • COLONOSCOPY N/A 03/11/2015    Two 4-6 mm polyps at the recto-sigmoid and in the mid ascending colon (PATH: tubulovillous adenoma w/ low-grade dysplasia & hyperplastic colon polyp at 20 cm), internal hemorrhoids; Dr. Chato Allen   • REFRACTIVE SURGERY Bilateral 2018   • SHOULDER SURGERY Bilateral 2005,2015    Left Shoulder 2005 Dr. Mehta, Right Shoulder 2015 Dr. Mehta       SOCIAL HISTORY  Social History     Socioeconomic History   • Marital status: Single     Spouse name: Doris   • Number of children: 3   • Years of education: Not on file   • Highest education level: Not on file   Occupational History   • Occupation: Software consulting   Tobacco Use   • Smoking status: Current Some Day Smoker     Packs/day: 0.75     Years: 0.00     Pack years: 0.00     Start date: 3/8/2019   • Smokeless tobacco: Former User     Quit date: 3/8/2019   • Tobacco comment: when he drinks   Substance and Sexual Activity   • Alcohol use: Yes     Alcohol/week: 1.2 oz     Types: 2 Shots of liquor per week     Frequency: 2-4 times a month     Drinks per session: 3 or 4   • Drug use: No    • Sexual activity: Yes     Partners: Female       FAMILY HISTORY  Family History   Problem Relation Age of Onset   • Melanoma Father    • Depression Father    • No Known Problems Sister    • Parkinsonism Maternal Grandmother    • Lung cancer Maternal Grandfather         smoker   • [place]'s disease Maternal Grandfather    • Lung cancer Paternal Grandmother         smoker   • Lung cancer Paternal Aunt         smoker   • Cancer Maternal Aunt         spine   • Heart attack Paternal Uncle 55   • Colon cancer Neg Hx    • Prostate cancer Neg Hx          **Dragon Disclaimer:   Much of this encounter note is an electronic transcription/translation of spoken language to printed text. The electronic translation of spoken language may permit erroneous, or at times, nonsensical words or phrases to be inadvertently transcribed. Although I have reviewed the note for such errors, some may still exist.       Template created by Kristal Jackson MD

## 2019-11-07 ENCOUNTER — TELEPHONE (OUTPATIENT)
Dept: NEUROLOGY | Facility: CLINIC | Age: 44
End: 2019-11-07

## 2019-11-07 RX ORDER — METHYLPREDNISOLONE 4 MG/1
TABLET ORAL
Qty: 21 TABLET | Refills: 0 | Status: SHIPPED | OUTPATIENT
Start: 2019-11-07 | End: 2019-11-12

## 2019-11-07 NOTE — TELEPHONE ENCOUNTER
----- Message from Avni Beth sent at 11/7/2019 10:58 AM EST -----  Contact: PT  Pt says he's been fighting a migraine x 3 days, it's not bad enough to go to the ER but he's exhausted from trying to fight it. Pt wants to ask Tommy Mazariegos what he can do.  Please call him at [phone].     ThanksJennyfer

## 2019-11-07 NOTE — TELEPHONE ENCOUNTER
If he'd like to come into the office for a Toradol injection he can do that. If he can't come in I can send him in a steroid pack to his pharmacy. Let me know which he would like to do. Thanks

## 2019-11-11 ENCOUNTER — OFFICE VISIT (OUTPATIENT)
Dept: PAIN MEDICINE | Facility: CLINIC | Age: 44
End: 2019-11-11

## 2019-11-11 VITALS
DIASTOLIC BLOOD PRESSURE: 85 MMHG | SYSTOLIC BLOOD PRESSURE: 123 MMHG | HEIGHT: 69 IN | RESPIRATION RATE: 20 BRPM | WEIGHT: 191.6 LBS | TEMPERATURE: 97.6 F | OXYGEN SATURATION: 95 % | BODY MASS INDEX: 28.38 KG/M2 | HEART RATE: 80 BPM

## 2019-11-11 DIAGNOSIS — G89.29 OTHER CHRONIC PAIN: Primary | ICD-10-CM

## 2019-11-11 DIAGNOSIS — M71.38 SYNOVIAL CYST OF LUMBAR FACET JOINT: ICD-10-CM

## 2019-11-11 DIAGNOSIS — M51.36 DEGENERATIVE LUMBAR DISC: ICD-10-CM

## 2019-11-11 DIAGNOSIS — M79.10 MYALGIA: ICD-10-CM

## 2019-11-11 PROBLEM — M51.369 DEGENERATIVE LUMBAR DISC: Status: ACTIVE | Noted: 2019-11-11

## 2019-11-11 PROCEDURE — 20552 NJX 1/MLT TRIGGER POINT 1/2: CPT | Performed by: NURSE PRACTITIONER

## 2019-11-11 PROCEDURE — 99214 OFFICE O/P EST MOD 30 MIN: CPT | Performed by: NURSE PRACTITIONER

## 2019-11-11 NOTE — PROGRESS NOTES
CHIEF COMPLAINT  New pt referred to our office by Charly Jackson MD for lower back pain. 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 Pt has flare ups from time to time every year and over the past 5 years his back started locking up. Last year pt lifted an exterior door and back locked up and he dropped to his knees and was in bed x 5 hours due to the intense back pain. Pt sts sometimes when he is standing or turning he would feel intense back pain, standing and moving sometimes relieves the pain. Pt sts went to [facility] ER last month for back pain, he had a steroid shot and medrol dose pack and small supply of oxycodone. Pt sought out PT on his own, [facility] Dr. Iván Horne, he currently goes 1-2 times a week and PRN, for his back pain, pt sts it manages pain well. Pt currently takes flexeril, and ibuprofen otc to control pain at home. Pt is interested in lumbar epidurals, never had epidurals in the past, never been in pain management before. Pt currently works for a software company, works from home.      Subjective   Sadiq Alexander is a  RHD 44 y.o. male.   He presents to the office for evaluation of chronic back pain. He was referred here by Dr Charly Jackson(PCP).  He lives with his wife and two kids(16, 14).  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 He currently works as a  for [employer], and works from home. Does not smoke, quit a couple months ago, was smoking half PPD x 20 years. Does drink alcohol, 1-2/week. Denies any illicit substance use. Family history is negative for alcoholism or substance use disorder. Family history is negative for chronic back problems.    Complains of pain in his low back, right worse than left. He can  "have instances where left side hurts more. Today his pain is 4/10VAS. Describes the pain as continuous dull with intermittent sharp pain. Pain increases with certain movements, sneezing, prolonged activity or position; pain decreases with rest, laying down, change position, heating pad. Was prescribed Flexeril 10 mg PRN. \"I'm trying to take it only when it's bad.\"  Taking approximately every other day. Does notice improvement with this. Does not take during day due to work and possible sleepiness. ADL's by self. Denies any bladder incontinence.  Does have minimal intermittent bowel incontinence, but he associates this with gallbladder removal recently.    Back pain started approximately 20 years while working as a paratrooper. He remembers one jump where his equipment got stuck inside plane while he was trying to jump out. Hit side of plane. HAd concussion and ankle injury as well. Had long-standing back pain after that.  He states he saw a chiropractor a few years later and he asked \"when did you break your back?\" No long-term relief. Has returned to PT, which he does notice this helps. He goes quite frequently. PT recommended updating MRI. PCP referred patient here.     Back Pain   This is a chronic problem. The current episode started more than 1 year ago. The problem occurs constantly. The problem has been gradually worsening since onset. The pain is present in the lumbar spine. The quality of the pain is described as aching. The pain does not radiate. The pain is at a severity of 4/10. Worse during: worsens as day progresses. The symptoms are aggravated by bending, position, standing and twisting. Associated symptoms include bowel incontinence, headaches (hx migraines), numbness (left lower leg) and weakness (back). Pertinent negatives include no bladder incontinence, chest pain or fever. He has tried chiropractic manipulation, analgesics, bed rest, heat, home exercises, ice, muscle relaxant and NSAIDs for the " symptoms. The treatment provided mild relief.      PEG Assessment   What number best describes your pain on average in the past week?5  What number best describes how, during the past week, pain has interfered with your enjoyment of life?3  What number best describes how, during the past week, pain has interfered with your general activity?  3      Current Outpatient Medications:   •  cyclobenzaprine (FLEXERIL) 10 MG tablet, Take 1 tablet by mouth 2 (Two) Times a Day As Needed for Muscle Spasms., Disp: 15 tablet, Rfl: 0  •  escitalopram (LEXAPRO) 10 MG tablet, TAKE 1 TABLET BY MOUTH DAILY., Disp: 30 tablet, Rfl: 5  •  fluticasone (FLONASE) 50 MCG/ACT nasal spray, 2 sprays into each nostril Daily. (Patient taking differently: 2 sprays into the nostril(s) as directed by provider Daily As Needed.), Disp: , Rfl:   •  Fremanezumab-vfrm 225 MG/1.5ML solution prefilled syringe, Inject 1.5 mL under the skin into the appropriate area as directed Every 30 (Thirty) Days., Disp: 1.5 mL, Rfl: 11  •  hydrocortisone (ANUSOL-HC) 25 MG suppository, 1 per rectum at bedtime as needed for hemorrhoidal bleeding, Disp: 10 suppository, Rfl: 5  •  pantoprazole (PROTONIX) 40 MG EC tablet, TAKE 1 TABLET BY MOUTH EVERY DAY, Disp: 30 tablet, Rfl: 5  •  rosuvastatin (CRESTOR) 10 MG tablet, Take 10 mg by mouth Daily., Disp: , Rfl:   •  ZOLMitriptan (ZOMIG-ZMT) 5 MG disintegrating tablet, TAKE 1 TABLET BY MOUTH 2 (TWO) TIMES A DAY AS NEEDED FOR MIGRAINE., Disp: 10 tablet, Rfl: 3  •  methylPREDNISolone (MEDROL, LUX,) 4 MG tablet, Take as directed on package instructions., Disp: 21 tablet, Rfl: 0  •  promethazine (PHENERGAN) 25 MG tablet, Take 1 tablet by mouth Every 8 (Eight) Hours As Needed for Nausea or Vomiting., Disp: 20 tablet, Rfl: 0    The following portions of the patient's history were reviewed and updated as appropriate: allergies, current medications, past family history, past medical history, past social history, past surgical history  and problem list.      REVIEW OF PERTINENT MEDICAL DATA    [facility] [id number]--- prescription for hydrocodone 5-3 25 quantity of 12 from Dr. Juan Antonio echevarria on 10/10/2019.  There is also another prescription for hydrocodone 7.5-325 quantity 30 from Dr Reji Hoffman on 2/5/2019.  There are 2 providers and 2 pharmacies on the [facility].    BECK Inventory-- 8    Reviewed Dr Jackson office visit 10-29-19--  Patient seen for back pain, needs MRI> patient has been going to physical therapy for paralumbar back pain.  Is slowly improving the progress has been slow.  PT suggested another MRI.  Has been seen by APRN who prescribed another round of steroids and cyclobenzaprine.  This did seem to help somewhat.  Denies any radicular symptoms.  He was prescribed hydrocodone 5 by ED but is no longer been taking it.  Continue cyclobenzaprine as needed.  Continue PT.  Refer to pain management for trigger point injections versus consideration for facet injections.  May need updated MRI.  OTC NSAIDs as needed.  Advised not to use hydrocodone.    MRI OF THE LUMBAR SPINE WITHOUT CONTRAST     HISTORY:  Low back pain radiating down into both hips for 20 years.     MRI of the lumbar spine was obtained with sagittal T1, proton density,  and T2-weighted images. Additionally, there are axial T1 and T2-weighted  images through the lumbar spine.     FINDINGS:     There is loss of the usual lordotic curvature of the lumbar spine.  Minimal levoconvex scoliotic curvature of the lumbar spine is seen with  its apex centered at L4.     At L1-2, there is no significant degree of canal or foraminal stenosis.   There is mild facet arthropathy. There is a disc osteophyte complex.  Some degenerative disc changes along with adjacent degenerative end  plate marrow changes are noted.     At L2-3, mild facet arthritic changes are noted. However, there is no  significant degree of canal or foraminal stenosis.     AT L3-4, there is mild-to-moderate facet arthropathy. There  is mild disc  bulging. Mild foraminal narrowing is noted. Minimal canal stenosis is  appreciated.     At L4-5, there is minimal disc bulging. Annular tear is noted. There is  a mild degree of left foraminal narrowing secondary to disc bulging.  Mild-to-moderate facet arthropathy is seen.     At L5-S1, there is moderate facet arthritic change. Synovial cysts are  seen arising from the left facet joint which project laterally into the  posterior paravertebral soft tissues. Mild foraminal narrowing is seen  secondary to disc bulging.     The conus medullaris terminates at the L1-2 level and has normal signal  intensity.     IMPRESSION:  1.  There is loss of the usual lordotic curvature of the lumbar spine.  Minimal levoconvex scoliotic curvature of the lumbar spine is seen with  its apex centered at L4.  2.  Relatively mild foraminal narrowing is identified within the lower  lumbar spine from L3-4 down to L5-S1.     This report was finalized on 1/26/2017 9:02 AM by Dr. Ollie Osman MD.    Review of Systems   Constitutional: Positive for activity change (dec) and fatigue (occ). Negative for fever and unexpected weight change.   HENT: Negative for congestion.    Eyes: Negative for visual disturbance.   Respiratory: Negative for shortness of breath.    Cardiovascular: Negative for chest pain.   Gastrointestinal: Positive for bowel incontinence, constipation (occ on stool softner) and nausea (occ with migraines). Negative for abdominal distention, diarrhea and vomiting.   Endocrine: Negative for polyuria.   Genitourinary: Negative for bladder incontinence and difficulty urinating.   Musculoskeletal: Positive for back pain. Negative for gait problem and joint swelling.   Allergic/Immunologic: Negative for immunocompromised state.   Neurological: Positive for weakness (back), numbness (left lower leg) and headaches (hx migraines). Negative for dizziness, seizures and light-headedness.   Psychiatric/Behavioral: Positive for  "sleep disturbance (occ). Negative for agitation and suicidal ideas. The patient is not nervous/anxious.      Vitals:    11/11/19 1009   BP: 123/85   Pulse: 80   Resp: 20   Temp: 97.6 °F (36.4 °C)   SpO2: 95%   Weight: 86.9 kg (191 lb 9.6 oz)   Height: 175.3 cm (69.02\")   PainSc:   4   PainLoc: Back     Objective   Physical Exam   Constitutional: He is oriented to person, place, and time. Vital signs are normal. He appears well-developed and well-nourished. He is cooperative.   HENT:   Head: Normocephalic and atraumatic.   Nose: Nose normal.   Eyes: Conjunctivae and lids are normal.   Cardiovascular: Normal rate.   Pulmonary/Chest: Effort normal.   Abdominal: Normal appearance.   Musculoskeletal:        Lumbar back: He exhibits decreased range of motion, tenderness, bony tenderness and spasm.   Mild to moderate tenderness of right SI joint  Minimal lumbar facet tenderness    Negative SLR bilaterally   Neurological: He is alert and oriented to person, place, and time. He has normal strength. No cranial nerve deficit. Gait normal.   Reflex Scores:       Patellar reflexes are 2+ on the right side and 2+ on the left side.       Achilles reflexes are 2+ on the right side and 2+ on the left side.  Skin: Skin is warm, dry and intact.   Psychiatric: He has a normal mood and affect. His speech is normal and behavior is normal. Judgment and thought content normal. Cognition and memory are normal.   Nursing note and vitals reviewed.    Procedures    Trigger Point Injection -- right lumbosacral musculature    The procedure including risks, expectations, possible complications, and benefits were explained to the patient in detail. Verbal and written consent were obtained.      The area over the myofascial spasm was identified and marked.  4 areas over the right lumbosacral area were identified.  The area over the myofascial spasm was prepped with alcohol.  A solution of 40 mg Depo-Medrol and 4 cc Marcaine 0.25% was prepared and " drawn into syringe using sterile technique.  Using sterile technique a 25 gauge 1 1/2 inch needle was placed in the center of the myofascial spasm.  Positive for muscle twitch response.  Negative aspiration.  1 cc of the Marcaine and Depo-Medrol solution was injection into each trigger point, the needle was withdrawn slightly and then redirected in cardial directions X 4 to breakup taut muscle bands.  The same technique was used to inject each identified trigger point.  The needle was removed intact.    The patient tolerated the procedure well without any apparent difficulties or complication.      No blood loss.      The patient reported feeling relief prior to leaving the office.      Bupivicaine 0.25% 3.5 ml [id number], [id number], FYC-12-2-2020  DepoMedrol 40 mg 1 ml [id number], [id number], EXP-11-      Assessment/Plan   Sadiq was seen today for back pain.    Diagnoses and all orders for this visit:    Other chronic pain    Degenerative lumbar disc  -     MRI Lumbar Spine Without Contrast; Future    Synovial cyst of lumbar facet joint  -     MRI Lumbar Spine Without Contrast; Future    Myalgia  -     Trigger Point Injection.      --- Hold on Routine new patient UDS-- patient desires to stay away from opioids.  --- Trigger point injection. No blood thinners. Reviewed the procedure at length with the patient.  Included in the review was expectations, complications, risk and benefits.The procedure was described in detail and the risks, benefits and alternatives were discussed with the patient (including but not limited to: bleeding, infection, nerve damage, worsening of pain, inability to perform injection, paralysis, seizures, and death) who agreed to proceed.  Discussed the potential for sedation if warranted/wanted.  The procedure will plan to be performed at [facility] with fluoroscopic guidance(unless ultrasound is indicated). Questions were answered and in a way  the patient could understand.  Patient verbalized understanding and wishes to proceed.  This intervention will be ordered.  Discussed with patient that all procedures are part of a multimodal plan of care and include either formal PT or a home exercise program.    --- Lumbar MRI without contrast  --- Continue with PT and previous regiment as otherwise ordered.   --- Consider lumbar MBB in future PRN.  Await updated MRI and results from TPI.  --- Follow-up after MRI or sooner if needed.     JEIMY REPORT      JEIMY report has been reviewed and scanned into the patient's chart.    As the clinician, I personally reviewed the JEIMY from 11-8-19 while the patient was in the office today.        EMR Dragon/Transcription disclaimer:   Much of this encounter note is an electronic transcription/translation of spoken language to printed text. The electronic translation of spoken language may permit erroneous, or at times, nonsensical words or phrases to be inadvertently transcribed; Although I have reviewed the note for such errors, some may still exist.

## 2019-11-12 ENCOUNTER — OFFICE VISIT (OUTPATIENT)
Dept: INTERNAL MEDICINE | Age: 44
End: 2019-11-12

## 2019-11-12 VITALS
WEIGHT: 189 LBS | DIASTOLIC BLOOD PRESSURE: 80 MMHG | TEMPERATURE: 96 F | SYSTOLIC BLOOD PRESSURE: 120 MMHG | BODY MASS INDEX: 27.99 KG/M2 | HEART RATE: 84 BPM | OXYGEN SATURATION: 98 % | HEIGHT: 69 IN

## 2019-11-12 DIAGNOSIS — F41.9 ANXIETY DISORDER, UNSPECIFIED TYPE: ICD-10-CM

## 2019-11-12 DIAGNOSIS — G89.29 CHRONIC BACK PAIN, UNSPECIFIED BACK LOCATION, UNSPECIFIED BACK PAIN LATERALITY: ICD-10-CM

## 2019-11-12 DIAGNOSIS — M54.9 CHRONIC BACK PAIN, UNSPECIFIED BACK LOCATION, UNSPECIFIED BACK PAIN LATERALITY: ICD-10-CM

## 2019-11-12 DIAGNOSIS — G43.909 MIGRAINE WITHOUT STATUS MIGRAINOSUS, NOT INTRACTABLE, UNSPECIFIED MIGRAINE TYPE: ICD-10-CM

## 2019-11-12 PROCEDURE — 99214 OFFICE O/P EST MOD 30 MIN: CPT | Performed by: INTERNAL MEDICINE

## 2019-11-12 RX ORDER — CYCLOBENZAPRINE HCL 5 MG
5 TABLET ORAL 3 TIMES DAILY PRN
Qty: 30 TABLET | Refills: 0 | Status: SHIPPED | OUTPATIENT
Start: 2019-11-12 | End: 2020-01-23 | Stop reason: SDUPTHER

## 2019-11-12 RX ORDER — ESCITALOPRAM OXALATE 10 MG/1
20 TABLET ORAL DAILY
Start: 2019-11-12 | End: 2019-12-05

## 2019-11-12 NOTE — PROGRESS NOTES
"[facility]IC[facility]  SEJAL THACKER M.D.      Sadiq HERNANDES Alexander / 44 y.o. / male  11/12/2019      CHIEF COMPLAINT     Lumbar paraspinal muscle spasm (2 week f/u) and Migraine         ASSESSMENT & PLAN     1. Chronic back pain, unspecified back location, unspecified back pain laterality    2. Migraine without status migrainosus, not intractable, unspecified migraine type    3. Anxiety disorder, unspecified type      No orders of the defined types were placed in this encounter.    New Medications Ordered This Visit   Medications   • cyclobenzaprine (FLEXERIL) 5 MG tablet     Sig: Take 1 tablet by mouth 3 (Three) Times a Day As Needed for Muscle Spasms.     Dispense:  30 tablet     Refill:  0   • escitalopram (LEXAPRO) 10 MG tablet     Sig: Take 2 tablets by mouth Daily.       Summary/Discussion:  ·     Next Appointment with me: 1/14/2020    No Follow-up on file.      VITAL SIGNS     Visit Vitals  /80   Pulse 84   Temp 96 °F (35.6 °C)   Ht 175.3 cm (69.02\")   Wt 85.7 kg (189 lb)   SpO2 98%   BMI 27.89 kg/m²       BP Readings from Last 3 Encounters:   11/12/19 120/80   11/11/19 123/85   10/29/19 118/78     Wt Readings from Last 3 Encounters:   11/12/19 85.7 kg (189 lb)   11/11/19 86.9 kg (191 lb 9.6 oz)   10/29/19 87.1 kg (192 lb)      Body mass index is 27.89 kg/m².        HISTORY OF PRESENT ILLNESS      Seen by pain mgmt and had trigger point injections and MRI was ordered for lumbar spine.   He deferred any pain medication.   Cyclobenzaprine 10 mg does help with the back pain without significant side effects.   Migraines have been little more active due to pain and stress. Sees neurologist and is on Anjovy. Uses Zomig PRN.   On escitalopram 10 mg which helps with stress and migraines.     Patient Care Team:  Charly Thacker MD as PCP - General (Internal Medicine)      REVIEW OF SYSTEMS       Review of Systems  Constitutional neg  Resp neg  CV neg  Neuro migraines  MuSk persistent back pain       PHYSICAL EXAMINATION "     Physical Exam  Constitutional  No distress  Psychiatric  Alert. Judgment intact. Thought content normal. Mood mildly dysthymic.     REVIEWED DATA       Labs:     Lab Results   Component Value Date     08/12/2019    K 4.4 08/12/2019    CALCIUM 8.9 08/12/2019    AST 20 08/12/2019    ALT 18 08/12/2019    BUN 15 08/12/2019    CREATININE 1.21 08/12/2019    CREATININE 1.39 (H) 02/05/2019    CREATININE 1.23 06/20/2018    EGFRIFNONA 65 08/12/2019    EGFRIFAFRI 79 08/12/2019       Lab Results   Component Value Date    HGBA1C 5.51 06/20/2018     (H) 08/12/2019     (H) 06/20/2018       Lab Results   Component Value Date    LDL 99 08/12/2019    LDL 86 09/21/2018     (H) 06/20/2018    HDL 41 08/12/2019    TRIG 114 08/12/2019    CHOLHDLRATIO 3.98 08/12/2019       Lab Results   Component Value Date    TSH 0.867 06/20/2018    FREET4 1.10 06/20/2018       Lab Results   Component Value Date    WBC 6.83 02/05/2019    HGB 14.6 02/05/2019    HGB 15.3 06/20/2018    HGB 15.6 02/13/2015     02/05/2019       Lab Results   Component Value Date    PROTEIN Negative 06/20/2018    GLUCOSEU Negative 06/20/2018    BLOODU Negative 06/20/2018    NITRITEU Negative 06/20/2018    LEUKOCYTESUR Negative 06/20/2018       Imaging:         Medical Tests:         Summary of old records / correspondence / consultant report:     Note from pain mgmt: trigger point injections, MRI     Request outside records:         ALLERGIES  No Known Allergies     MEDICATIONS  Current Outpatient Medications   Medication Sig Dispense Refill   • cyclobenzaprine (FLEXERIL) 5 MG tablet Take 1 tablet by mouth 3 (Three) Times a Day As Needed for Muscle Spasms. 30 tablet 0   • escitalopram (LEXAPRO) 10 MG tablet Take 2 tablets by mouth Daily.     • fluticasone (FLONASE) 50 MCG/ACT nasal spray 2 sprays into each nostril Daily. (Patient taking differently: 2 sprays into the nostril(s) as directed by provider Daily As Needed.)     •  Fremanezumab-vfrm 225 MG/1.5ML solution prefilled syringe Inject 1.5 mL under the skin into the appropriate area as directed Every 30 (Thirty) Days. 1.5 mL 11   • hydrocortisone (ANUSOL-HC) 25 MG suppository 1 per rectum at bedtime as needed for hemorrhoidal bleeding 10 suppository 5   • pantoprazole (PROTONIX) 40 MG EC tablet TAKE 1 TABLET BY MOUTH EVERY DAY 30 tablet 5   • promethazine (PHENERGAN) 25 MG tablet Take 1 tablet by mouth Every 8 (Eight) Hours As Needed for Nausea or Vomiting. 20 tablet 0   • rosuvastatin (CRESTOR) 10 MG tablet Take 10 mg by mouth Daily.     • ZOLMitriptan (ZOMIG-ZMT) 5 MG disintegrating tablet TAKE 1 TABLET BY MOUTH 2 (TWO) TIMES A DAY AS NEEDED FOR MIGRAINE. 10 tablet 3     No current facility-administered medications for this visit.        PFSH:     The following portions of the patient's history were reviewed and updated as appropriate: Allergies / Current Medications / Past Medical History / Surgical History / Social History / Family History    PROBLEM LIST   Patient Active Problem List   Diagnosis   • Colon polyp   • Internal hemorrhoids   • Migraines   • Nicotine dependence   • Anxiety disorder   • Hyperlipidemia   • Irritable bowel syndrome with both constipation and diarrhea   • GERD with esophagitis   • Restless legs syndrome (RLS)   • Other chronic pain   • Degenerative lumbar disc   • Synovial cyst of lumbar facet joint   • Myalgia   • Chronic back pain       PAST MEDICAL HISTORY  Past Medical History:   Diagnosis Date   • Anxiety    • Arthritis    • Colon polyps    • Colon polyps    • Elevated cholesterol    • GERD (gastroesophageal reflux disease)    • Hyperlipidemia    • Migraines        SURGICAL HISTORY  Past Surgical History:   Procedure Laterality Date   • CHOLECYSTECTOMY WITH INTRAOPERATIVE CHOLANGIOGRAM N/A 2/5/2019    Procedure: CHOLECYSTECTOMY LAPAROSCOPIC INTRAOPERATIVE CHOLANGIOGRAM;  Surgeon: Reji Hoffman MD;  Location: [facility] OR;  Service: General    • COLONOSCOPY N/A 2015    Two 4-6 mm polyps at the recto-sigmoid and in the mid ascending colon (PATH: tubulovillous adenoma w/ low-grade dysplasia & hyperplastic colon polyp at 20 cm), internal hemorrhoids; Dr. Chato Allen   • REFRACTIVE SURGERY Bilateral    • SHOULDER SURGERY Bilateral ,    Left Shoulder  Dr. Mehta, Right Shoulder  Dr. Mehta       SOCIAL HISTORY  Social History     Socioeconomic History   • Marital status: Single     Spouse name: Doris   • Number of children: 3   • Years of education: Not on file   • Highest education level: Not on file   Occupational History   • Occupation: Software consulting   Tobacco Use   • Smoking status: Former Smoker     Packs/day: 0.75     Years: 0.00     Pack years: 0.00     Start date: 3/8/2019     Last attempt to quit: 2019     Years since quittin.1   • Smokeless tobacco: Former User     Types: Chew     Quit date: 3/8/2019   • Tobacco comment: when he drinks   Substance and Sexual Activity   • Alcohol use: Yes     Alcohol/week: 1.8 oz     Types: 1 Glasses of wine, 1 Cans of beer, 1 Shots of liquor per week     Frequency: 2-3 times a week     Drinks per session: 1 or 2     Comment: drinks occasionally 1-2 drinks per week   • Drug use: Yes     Types: Marijuana     Comment: used marijuana in the past in college   • Sexual activity: Yes     Partners: Female       FAMILY HISTORY  Family History   Problem Relation Age of Onset   • Melanoma Father    • Depression Father    • No Known Problems Sister    • Parkinsonism Maternal Grandmother    • Lung cancer Maternal Grandfather         smoker   • Dayhoit's disease Maternal Grandfather    • Lung cancer Paternal Grandmother         smoker   • Lung cancer Paternal Aunt         smoker   • Cancer Maternal Aunt         spine   • Heart attack Paternal Uncle 55   • Colon cancer Neg Hx    • Prostate cancer Neg Hx          **Dragon Disclaimer:   Much of this encounter note is an electronic  transcription/translation of spoken language to printed text. The electronic translation of spoken language may permit erroneous, or at times, nonsensical words or phrases to be inadvertently transcribed. Although I have reviewed the note for such errors, some may still exist.       Template created by Kristal Jackson MD

## 2019-11-12 NOTE — ASSESSMENT & PLAN NOTE
Suggested increasing escitalopram up to 20 mg to help with stress from dealing with chronic back pain.

## 2019-11-12 NOTE — ASSESSMENT & PLAN NOTE
Had trigger point injections with pain mgmt. To undergo MRI of lumbar spine.   Refilled cyclobenzaprine at 5 mg 1-2 TID PRN muscle spasm/pain.   Continue follow-up with pain mgmt.   See me in 2 months.

## 2019-12-02 ENCOUNTER — HOSPITAL ENCOUNTER (OUTPATIENT)
Dept: MRI IMAGING | Facility: HOSPITAL | Age: 44
Discharge: HOME | End: 2019-12-02
Admitting: NURSE PRACTITIONER

## 2019-12-02 DIAGNOSIS — M51.36 DEGENERATIVE LUMBAR DISC: ICD-10-CM

## 2019-12-02 DIAGNOSIS — M71.38 SYNOVIAL CYST OF LUMBAR FACET JOINT: ICD-10-CM

## 2019-12-02 PROCEDURE — 72148 MRI LUMBAR SPINE W/O DYE: CPT

## 2019-12-04 NOTE — PROGRESS NOTES
Reviewed MRI. There is a disc bulge at left L3, but this is unchanged from previous MRI. Nothing acute otherwise. Thanks. TIESHA

## 2019-12-05 DIAGNOSIS — G43.909 MIGRAINE WITHOUT STATUS MIGRAINOSUS, NOT INTRACTABLE, UNSPECIFIED MIGRAINE TYPE: ICD-10-CM

## 2019-12-05 DIAGNOSIS — F41.9 ANXIETY DISORDER, UNSPECIFIED TYPE: ICD-10-CM

## 2019-12-05 RX ORDER — ESCITALOPRAM OXALATE 10 MG/1
10 TABLET ORAL DAILY
Qty: 30 TABLET | Refills: 5 | Status: SHIPPED | OUTPATIENT
Start: 2019-12-05 | End: 2020-05-20

## 2019-12-12 ENCOUNTER — OFFICE VISIT (OUTPATIENT)
Dept: PAIN MEDICINE | Facility: CLINIC | Age: 44
End: 2019-12-12

## 2019-12-12 VITALS
BODY MASS INDEX: 28.94 KG/M2 | TEMPERATURE: 97.6 F | OXYGEN SATURATION: 99 % | WEIGHT: 195.4 LBS | HEIGHT: 69 IN | RESPIRATION RATE: 20 BRPM | SYSTOLIC BLOOD PRESSURE: 116 MMHG | HEART RATE: 71 BPM | DIASTOLIC BLOOD PRESSURE: 79 MMHG

## 2019-12-12 DIAGNOSIS — G89.29 OTHER CHRONIC PAIN: Primary | ICD-10-CM

## 2019-12-12 DIAGNOSIS — M51.36 DEGENERATIVE LUMBAR DISC: ICD-10-CM

## 2019-12-12 DIAGNOSIS — M47.816 LUMBAR FACET ARTHROPATHY: ICD-10-CM

## 2019-12-12 DIAGNOSIS — M71.38 SYNOVIAL CYST OF LUMBAR FACET JOINT: ICD-10-CM

## 2019-12-12 PROCEDURE — 99214 OFFICE O/P EST MOD 30 MIN: CPT | Performed by: NURSE PRACTITIONER

## 2019-12-12 NOTE — PROGRESS NOTES
CHIEF COMPLAINT  F/u back pain. Pt had trigger point inj at last ov. Pt sts received muscle relief from trigger point inj. Pt had MRI lumbar on 12/2/19. Pt would like to discuss results of MRI, possible lumbar epidural due to increased lower back pain since trigger point inj.     Subjective   Sadiq Alexander is a 44 y.o. male  who presents to the office for follow-up.He has a history of chronic back pain. Reports his pain improved with TPI.    Patient presents to the office for follow-up of PROCEDURE. Patient had a TPI performed by myself on 11-11-19 for management of low back pain. Patient reports 50% relief from the procedure for 1 week.    Complains of pain in his low back. Today his pain is 4/10VAS. The pain is not really radiating into his legs. The pain is worse in his left low back than right. Describes his pain as nearly continuous aching and throbbing. Pain increases with walking, standing, activity, bending/lifting/twisting; pain decreases with rest, changing position, heat/ice. Failed NSAIDS. Failed PT. ADL's by self. Denies any bowel or bladder changes.     Back Pain   This is a chronic problem. The current episode started more than 1 year ago. The problem occurs constantly. The problem has been gradually worsening (variable since last office visit) since onset. The pain is present in the lumbar spine. The quality of the pain is described as aching. The pain does not radiate. The pain is at a severity of 4/10. Worse during: worsens as day progresses. The symptoms are aggravated by bending, position, standing and twisting. Associated symptoms include bowel incontinence, headaches (hx migraines), numbness (left lower leg) and weakness (back). Pertinent negatives include no bladder incontinence, chest pain or fever. He has tried chiropractic manipulation, analgesics, bed rest, heat, home exercises, ice, muscle relaxant and NSAIDs for the symptoms. The treatment provided mild relief.      MRI OF THE LUMBAR  SPINE WITHOUT CONTRAST      CLINICAL HISTORY: Low back pain radiating into both hips for 20 years.  Re-injured back 1 month ago. Follow-up synovial cyst from previous MRI.     TECHNIQUE:  MRI of the lumbar spine was obtained with sagittal T1,  proton-density, and T2-weighted images. Additionally, there are axial T1  and T2-weighted images through the lumbar spine.     COMPARISON: Comparison is made to previous MRI of the lumbar spine dated  01/25/2017.     FINDINGS:  There is loss of the usual lordotic curvature of the lumbar spine and  minimal levoconvex scoliotic curvature is seen centered at L4.     The conus medullaris terminates at the L1-2 level and has normal signal  intensity.     At L1-2, there is no significant degree of canal or foraminal narrowing.  There is a disc osteophyte complex minimally indenting the ventral  subarachnoid space. Mild facet arthropathy is identified. Prominent  degenerative disc changes are identified at L1-2.     At L2-3, there is mild facet arthropathy but no significant canal or  foraminal narrowing is identified.     At L3-4, there is mild-to-moderate facet arthropathy. There is mild disc  bulging which is eccentric to the left and this results in a mild degree  of left and a minimal degree of right foraminal narrowing. The bulging  disc material approaches and may abut the already exited left L3 nerve  root. Similar findings are noted on the prior examination. Minimal canal  stenosis is identified at the L3-4 level. Again, these findings are  stable.     At L4-5, there is a disc bulge with an annular fissure. The bulging disc  material is eccentric to the left and this results in a mild degree of  left foraminal narrowing. The bulging disc material approaches and may  abut the already exited left L4 nerve root. Mild-to-moderate facet  arthropathy is seen. Again no significant interval change is identified  when compared to the prior exam.     At L5-S1, there is moderate facet  arthropathy. There are synovial cysts  noted arising from the left facet joint and these project laterally into  the posterior paravertebral musculature. There is mild foraminal  narrowing secondary to bulging disc material. Again, the findings are  unchanged when compared to the prior exam.     IMPRESSION:  When compared to the prior study, there is no significant interval  change. There is loss of the usual lordotic curvature of the lumbar  spine and minimal levoconvex scoliotic curvature is seen with its apex  centered at L4.     At L3-4 and L4-5, there is bulging disc material eccentric to the left  which approaches and may abut the already exited left L3 and L4 nerve  roots respectively.     This report was finalized on 12/2/2019 4:38 PM by Dr. Ollie Osman M.D.    PEG Assessment   What number best describes your pain on average in the past week?7  What number best describes how, during the past week, pain has interfered with your enjoyment of life?5  What number best describes how, during the past week, pain has interfered with your general activity?  5    The following portions of the patient's history were reviewed and updated as appropriate: allergies, current medications, past family history, past medical history, past social history, past surgical history and problem list.    Review of Systems   Constitutional: Positive for activity change (dec) and fatigue (occ). Negative for fever and unexpected weight change.   HENT: Negative for congestion.    Eyes: Negative for visual disturbance.   Respiratory: Negative for shortness of breath.    Cardiovascular: Negative for chest pain.   Gastrointestinal: Positive for bowel incontinence. Negative for abdominal distention, constipation, diarrhea, nausea (occ with migraines) and vomiting.   Endocrine: Negative for polyuria.   Genitourinary: Negative for bladder incontinence and difficulty urinating.   Musculoskeletal: Positive for back pain. Negative for gait  "problem and joint swelling.   Allergic/Immunologic: Negative for immunocompromised state.   Neurological: Positive for weakness (back), numbness (left lower leg) and headaches (hx migraines). Negative for dizziness, seizures and light-headedness.   Psychiatric/Behavioral: Positive for sleep disturbance (occ). Negative for agitation and suicidal ideas. The patient is not nervous/anxious.        Vitals:    12/12/19 1124   BP: 116/79   Pulse: 71   Resp: 20   Temp: 97.6 °F (36.4 °C)   SpO2: 99%   Weight: 88.6 kg (195 lb 6.4 oz)   Height: 175.3 cm (69.02\")   PainSc:   4   PainLoc: Back     Objective   Physical Exam   Constitutional: He is oriented to person, place, and time. Vital signs are normal. He appears well-developed and well-nourished. He is cooperative.   HENT:   Head: Normocephalic and atraumatic.   Nose: Nose normal.   Eyes: Conjunctivae and lids are normal.   Cardiovascular: Normal rate.   Pulmonary/Chest: Effort normal.   Abdominal: Normal appearance.   Musculoskeletal:        Lumbar back: He exhibits decreased range of motion, tenderness, bony tenderness (moderate tenderness of bilateral L4-S1 facets; + loading manuever) and spasm.   Negative SLR bilaterally   Neurological: He is alert and oriented to person, place, and time. Gait normal.   Reflex Scores:       Patellar reflexes are 2+ on the right side and 2+ on the left side.  Skin: Skin is warm, dry and intact.   Psychiatric: He has a normal mood and affect. His speech is normal and behavior is normal. Judgment and thought content normal. Cognition and memory are normal.   Nursing note and vitals reviewed.      Assessment/Plan   Sadiq was seen today for back pain.    Diagnoses and all orders for this visit:    Other chronic pain    Degenerative lumbar disc    Synovial cyst of lumbar facet joint  -     Case Request    Lumbar facet arthropathy  -     Case Request      --- bilateral L3-L5. No blood thinners. Reviewed the procedure at length with the " "patient.  Included in the review was expectations, complications, risk and benefits.The procedure was described in detail and the risks, benefits and alternatives were discussed with the patient (including but not limited to: bleeding, infection, nerve damage, worsening of pain, inability to perform injection, paralysis, seizures, and death) who agreed to proceed.  Discussed the potential for sedation if warranted/wanted.  The procedure will plan to be performed at [facility] with fluoroscopic guidance(unless ultrasound is indicated). Questions were answered and in a way the patient could understand.  Patient verbalized understanding and wishes to proceed.  This intervention will be ordered.  Discussed with patient that all procedures are part of a multimodal plan of care and include either formal PT or a home exercise program.    --- Reviewed lumbar MRi with patient, used model to explain.  --- Follow-up after procedure or sooner if needed.    -------  Education about Medial Branch Blockade and RF Therapy:    This medial branch blockade (MBB) suggested is intended for diagnostic purposes, with the intent of offering the patient Radiofrequency thermal rhizotomy (RF) if the MBB is diagnostically effective.  The diagnostic blockade is necessary to determine the likelihood that RF therapy could be efficacious in providing long term relief to the patient.    Medial branches are sensory nerve branches that connect to a facet joint and transmit sensations & pain signals from that joint.  Facet is a term for the type of joints found in the spine.  Medial branches are the nerves that go to a facet, and therefore are also sometimes called \"facet joint nerves\" (FJNs).      In a medial branch blockade procedure, xray fluoroscopy is used to verify the locations of the outside of the joint lines which are being targeted.  Under xray guidance, needles are placed to these areas.  Contrast dye is injected to " confirm proper placement, with dye flowing over the joint area, and to ensure that the dye does not flow into unintended areas such as a vein.  When this is confirmed, local anesthetic is injected to block the medial branch at that joint level.      If MBBs are diagnostically successful in blocking pain, then the patient is most likely a great candidate for Radiofrequency of those facet joint nerves.  In the RF procedure, needles are placed to the joint lines in the same fashion, and after testing, the needle tips are heated to thermally treat the nerves, blocking the nerves by in essence damaging the nerves with the heat treatment.       Medically, a successful RF procedure should provide a patient with 50% pain relief or more for at least 6 months.  Clinical experience suggests that successful patients receive relief more in the range of 12 months on average.  We also discussed that a fortunate minority of patients receive therapeutic success from the MBB, and may not require RF ablation.  If a patient receives more than 8 weeks of relief from MBB, then occasional repeat MBB for therapeutic purposes is a very reasonable alternative therapy.  This course of therapy is consistent with our LCDs according to our CMS  in the area, and therefore other insurance providers should follow accordingly.  We will monitor our patients to screen for these therapeutic responders and will offer RF therapy only when necessary.        We discussed that MBB & RF are not without risks.  Guidelines regarding anticoagulant use & neuraxial procedures will be respected.  Patients that are ill or otherwise may be at risk for sepsis will not have their spines accessed by neuraxial injections of any type.  This patient will not be offered these therapies if there is an increased risk.   We discussed that there is a risk of postprocedural pain and also a risk of worsening of clinical picture with these procedures as with any  neuraxial procedure.    -------       JEIMY REPORT    JEIMY report has been reviewed and scanned into the patient's chart.    As the clinician, I personally reviewed the JEIMY from 12-10-19 while the patient was in the office today.          EMR Dragon/Transcription disclaimer:   Much of this encounter note is an electronic transcription/translation of spoken language to printed text. The electronic translation of spoken language may permit erroneous, or at times, nonsensical words or phrases to be inadvertently transcribed; Although I have reviewed the note for such errors, some may still exist.

## 2019-12-23 RX ORDER — METHYLPREDNISOLONE 4 MG/1
TABLET ORAL
Qty: 21 TABLET | Refills: 0 | Status: SHIPPED | OUTPATIENT
Start: 2019-12-23 | End: 2020-01-14

## 2020-01-14 ENCOUNTER — OFFICE VISIT (OUTPATIENT)
Dept: INTERNAL MEDICINE | Age: 45
End: 2020-01-14

## 2020-01-14 VITALS
OXYGEN SATURATION: 99 % | HEIGHT: 69 IN | BODY MASS INDEX: 29.33 KG/M2 | DIASTOLIC BLOOD PRESSURE: 78 MMHG | WEIGHT: 198 LBS | TEMPERATURE: 96.9 F | HEART RATE: 72 BPM | SYSTOLIC BLOOD PRESSURE: 120 MMHG

## 2020-01-14 DIAGNOSIS — M54.9 CHRONIC BACK PAIN, UNSPECIFIED BACK LOCATION, UNSPECIFIED BACK PAIN LATERALITY: Primary | Chronic | ICD-10-CM

## 2020-01-14 DIAGNOSIS — E78.5 HYPERLIPIDEMIA, UNSPECIFIED HYPERLIPIDEMIA TYPE: Chronic | ICD-10-CM

## 2020-01-14 DIAGNOSIS — G43.119 INTRACTABLE MIGRAINE WITH AURA WITHOUT STATUS MIGRAINOSUS: Chronic | ICD-10-CM

## 2020-01-14 DIAGNOSIS — G89.29 CHRONIC BACK PAIN, UNSPECIFIED BACK LOCATION, UNSPECIFIED BACK PAIN LATERALITY: Primary | Chronic | ICD-10-CM

## 2020-01-14 DIAGNOSIS — F41.9 ANXIETY DISORDER, UNSPECIFIED TYPE: Chronic | ICD-10-CM

## 2020-01-14 PROCEDURE — 99214 OFFICE O/P EST MOD 30 MIN: CPT | Performed by: INTERNAL MEDICINE

## 2020-01-14 RX ORDER — ONDANSETRON HYDROCHLORIDE 8 MG/1
8 TABLET, FILM COATED ORAL EVERY 8 HOURS PRN
COMMUNITY
Start: 2019-12-29 | End: 2021-11-16

## 2020-01-14 NOTE — PROGRESS NOTES
[facility]  SEJAL THACKER M.D.      Sadiq HERNANDES Alexander / 44 y.o. / male  01/14/2020      CHIEF COMPLAINT     Back Pain (chronic) and Anxiety      ASSESSMENT & PLAN     Problem List Items Addressed This Visit        High    Chronic back pain - Primary (Chronic)    Current Assessment & Plan     Continue plans as per pain mgmt.   Discussed Cymbalta.   Continue cyclobenzaprine PRN          Relevant Medications    cyclobenzaprine (FLEXERIL) 5 MG tablet       Medium    Anxiety disorder (Chronic)    Overview     H/o ADHD.     Continue escitalopram 10 mg qd.          Relevant Medications    escitalopram (LEXAPRO) 10 MG tablet    Hyperlipidemia (Chronic)    Overview     Continue rosuvastatin 10 mg.          Current Assessment & Plan     Lab Results   Component Value Date    LDL 99 08/12/2019    LDL 86 09/21/2018     (H) 06/20/2018    HDL 41 08/12/2019    HDL 35 (L) 09/21/2018    HDL 42 06/20/2018    TRIG 114 08/12/2019    CHOLHDLRATIO 3.98 08/12/2019    CHOLHDLRATIO 4.0 09/21/2018    CHOLHDLRATIO 6.14 06/20/2018             Relevant Medications    rosuvastatin (CRESTOR) 10 MG tablet       Low    Migraines (Chronic)    Overview     *Neuro (Mesha)    On Ajovy         Relevant Medications    ZOLMitriptan (ZOMIG-ZMT) 5 MG disintegrating tablet    Fremanezumab-vfrm 225 MG/1.5ML solution prefilled syringe    cyclobenzaprine (FLEXERIL) 5 MG tablet    escitalopram (LEXAPRO) 10 MG tablet        No orders of the defined types were placed in this encounter.    No orders of the defined types were placed in this encounter.      Summary/Discussion:  ·       Next Appointment with me: 2/12/2020    Return in about 4 months (around 5/14/2020) for Reassess chronic medical problems.      MEDICATIONS     Current Outpatient Medications   Medication Sig Dispense Refill   • cyclobenzaprine (FLEXERIL) 5 MG tablet Take 1 tablet by mouth 3 (Three) Times a Day As Needed for Muscle Spasms. 30 tablet 0   • escitalopram (LEXAPRO) 10 MG tablet  "TAKE 1 TABLET BY MOUTH DAILY. 30 tablet 5   • fluticasone (FLONASE) 50 MCG/ACT nasal spray 2 sprays into each nostril Daily. (Patient taking differently: 2 sprays into the nostril(s) as directed by provider Daily As Needed.)     • Fremanezumab-vfrm 225 MG/1.5ML solution prefilled syringe Inject 1.5 mL under the skin into the appropriate area as directed Every 30 (Thirty) Days. 1.5 mL 11   • hydrocortisone (ANUSOL-HC) 25 MG suppository 1 per rectum at bedtime as needed for hemorrhoidal bleeding 10 suppository 5   • ondansetron (ZOFRAN) 8 MG tablet Take 8 mg by mouth Every 8 (Eight) Hours As Needed.     • pantoprazole (PROTONIX) 40 MG EC tablet TAKE 1 TABLET BY MOUTH EVERY DAY 30 tablet 5   • rosuvastatin (CRESTOR) 10 MG tablet Take 10 mg by mouth Daily.     • ZOLMitriptan (ZOMIG-ZMT) 5 MG disintegrating tablet TAKE 1 TABLET BY MOUTH 2 (TWO) TIMES A DAY AS NEEDED FOR MIGRAINE. 10 tablet 3     No current facility-administered medications for this visit.           VITAL SIGNS     Visit Vitals  /78   Pulse 72   Temp 96.9 °F (36.1 °C)   Ht 175.3 cm (69.02\")   Wt 89.8 kg (198 lb)   SpO2 99%   BMI 29.22 kg/m²       BP Readings from Last 3 Encounters:   01/14/20 120/78   12/12/19 116/79   11/12/19 120/80     Wt Readings from Last 3 Encounters:   01/14/20 89.8 kg (198 lb)   12/12/19 88.6 kg (195 lb 6.4 oz)   11/12/19 85.7 kg (189 lb)      Body mass index is 29.22 kg/m².      HISTORY OF PRESENT ILLNESS     Chronic low back pain is the same, working with PT, sees pain mgmt, and plan is for facet injections.   Cyclobenzaprine helps mildly. Plans to get a standing desk.    Anxiety d/o is overall stable on escitalopram. Sees therapist.     Migraines are overall stable on Ajovy, sees neuro.     Chronic hyperlipidemia:  Current therapy include rosuvastatin, denies problems with medication.    Most recent labs:   Lab Results   Component Value Date    LDL 99 08/12/2019    LDL 86 09/21/2018     (H) 06/20/2018    HDL 41 " 08/12/2019    HDL 35 (L) 09/21/2018    HDL 42 06/20/2018    TRIG 114 08/12/2019    CHOLHDLRATIO 3.98 08/12/2019    CHOLHDLRATIO 4.0 09/21/2018    CHOLHDLRATIO 6.14 06/20/2018          Patient Care Team:  Charly Jackson MD as PCP - General (Internal Medicine)  Jordyn Frazier APRN as Nurse Practitioner (Pain Medicine)  Nathaniel Hurst MD as Consulting Physician (Pain Medicine)  Tommy Zimmer APRN as Nurse Practitioner (Neurology)      REVIEW OF SYSTEMS     Review of Systems  Constitutional neg  Resp neg  CV neg  MuSk chronic low back pain without sciatica  Psych anxiety, not depressed       PHYSICAL EXAMINATION     Physical Exam  Constitutional  No distress  MuSk low back paraspinal muscle tightness/ttp   Psychiatric  Alert. Judgment intact. Thought content normal. Mood normal      REVIEWED DATA     Labs:     Lab Results   Component Value Date     08/12/2019    K 4.4 08/12/2019    CALCIUM 8.9 08/12/2019    AST 20 08/12/2019    ALT 18 08/12/2019    BUN 15 08/12/2019    CREATININE 1.21 08/12/2019    CREATININE 1.39 (H) 02/05/2019    CREATININE 1.23 06/20/2018    EGFRIFNONA 65 08/12/2019    EGFRIFAFRI 79 08/12/2019       Lab Results   Component Value Date    HGBA1C 5.51 06/20/2018     (H) 08/12/2019     (H) 06/20/2018       Lab Results   Component Value Date    LDL 99 08/12/2019    LDL 86 09/21/2018     (H) 06/20/2018    HDL 41 08/12/2019    HDL 35 (L) 09/21/2018    HDL 42 06/20/2018    TRIG 114 08/12/2019    TRIG 102 09/21/2018    TRIG 208 (H) 06/20/2018    CHOLHDLRATIO 3.98 08/12/2019    CHOLHDLRATIO 4.0 09/21/2018    CHOLHDLRATIO 6.14 06/20/2018       Lab Results   Component Value Date    TSH 0.867 06/20/2018    FREET4 1.10 06/20/2018       Lab Results   Component Value Date    WBC 6.83 02/05/2019    HGB 14.6 02/05/2019    HGB 15.3 06/20/2018    HGB 15.6 02/13/2015     02/05/2019       Lab Results   Component Value Date    PROTEIN Negative 06/20/2018    GLUCOSEU Negative  06/20/2018    BLOODU Negative 06/20/2018    NITRITEU Negative 06/20/2018    LEUKOCYTESUR Negative 06/20/2018       Imaging:   MRI lumbar reviewed       Medical Tests:         Summary of old records / correspondence / consultant report:         Request outside records:         ALLERGIES  No Known Allergies     PFSH:     The following portions of the patient's history were reviewed and updated as appropriate: Allergies / Current Medications / Past Medical History / Surgical History / Social History / Family History    PROBLEM LIST   Patient Active Problem List   Diagnosis   • Colon polyp   • Internal hemorrhoids   • Migraines   • Nicotine dependence   • Anxiety disorder   • Hyperlipidemia   • Irritable bowel syndrome with both constipation and diarrhea   • GERD with esophagitis   • Restless legs syndrome (RLS)   • Other chronic pain   • Degenerative lumbar disc   • Synovial cyst of lumbar facet joint   • Myalgia   • Chronic back pain       PAST MEDICAL HISTORY  Past Medical History:   Diagnosis Date   • Anxiety    • Arthritis    • Colon polyps    • Colon polyps    • Elevated cholesterol    • GERD (gastroesophageal reflux disease)    • Hyperlipidemia    • Migraines        SURGICAL HISTORY  Past Surgical History:   Procedure Laterality Date   • CHOLECYSTECTOMY WITH INTRAOPERATIVE CHOLANGIOGRAM N/A 2/5/2019    Procedure: CHOLECYSTECTOMY LAPAROSCOPIC INTRAOPERATIVE CHOLANGIOGRAM;  Surgeon: Reji Hoffman MD;  Location: [facility];  Service: General   • COLONOSCOPY N/A 03/11/2015    Two 4-6 mm polyps at the recto-sigmoid and in the mid ascending colon (PATH: tubulovillous adenoma w/ low-grade dysplasia & hyperplastic colon polyp at 20 cm), internal hemorrhoids; Dr. Chato Allen   • REFRACTIVE SURGERY Bilateral 2018   • SHOULDER SURGERY Bilateral 2005,2015    Left Shoulder 2005 Dr. Mehta, Right Shoulder 2015 Dr. Mehta       SOCIAL HISTORY  Social History     Socioeconomic History   • Marital status:  Single     Spouse name: Doris   • Number of children: 3   • Years of education: Not on file   • Highest education level: Not on file   Occupational History   • Occupation: Software consulting   Tobacco Use   • Smoking status: Former Smoker     Packs/day: 0.75     Years: 0.00     Pack years: 0.00     Start date: 3/8/2019     Last attempt to quit: 2019     Years since quittin.3   • Smokeless tobacco: Former User     Types: Chew     Quit date: 3/8/2019   • Tobacco comment: when he drinks   Substance and Sexual Activity   • Alcohol use: Yes     Alcohol/week: 3.0 standard drinks     Types: 1 Glasses of wine, 1 Cans of beer, 1 Shots of liquor per week     Frequency: 2-3 times a week     Drinks per session: 1 or 2     Comment: drinks occasionally 1-2 drinks per week   • Drug use: Yes     Types: Marijuana     Comment: used marijuana in the past in college   • Sexual activity: Yes     Partners: Female       FAMILY HISTORY  Family History   Problem Relation Age of Onset   • Melanoma Father    • Depression Father    • No Known Problems Sister    • Parkinsonism Maternal Grandmother    • Lung cancer Maternal Grandfather         smoker   • Port Tobacco's disease Maternal Grandfather    • Lung cancer Paternal Grandmother         smoker   • Lung cancer Paternal Aunt         smoker   • Cancer Maternal Aunt         spine   • Heart attack Paternal Uncle 55   • Colon cancer Neg Hx    • Prostate cancer Neg Hx          **Dragon Disclaimer:   Much of this encounter note is an electronic transcription/translation of spoken language to printed text. The electronic translation of spoken language may permit erroneous, or at times, nonsensical words or phrases to be inadvertently transcribed. Although I have reviewed the note for such errors, some may still exist.       Template created by Kristal Jackson MD

## 2020-01-14 NOTE — ASSESSMENT & PLAN NOTE
Lab Results   Component Value Date    LDL 99 08/12/2019    LDL 86 09/21/2018     (H) 06/20/2018    HDL 41 08/12/2019    HDL 35 (L) 09/21/2018    HDL 42 06/20/2018    TRIG 114 08/12/2019    CHOLHDLRATIO 3.98 08/12/2019    CHOLHDLRATIO 4.0 09/21/2018    CHOLHDLRATIO 6.14 06/20/2018

## 2020-01-15 ENCOUNTER — DOCUMENTATION (OUTPATIENT)
Dept: PAIN MEDICINE | Facility: CLINIC | Age: 45
End: 2020-01-15

## 2020-01-15 ENCOUNTER — OUTSIDE FACILITY SERVICE (OUTPATIENT)
Dept: PAIN MEDICINE | Facility: CLINIC | Age: 45
End: 2020-01-15

## 2020-01-15 PROCEDURE — 64494 INJ PARAVERT F JNT L/S 2 LEV: CPT | Performed by: ANESTHESIOLOGY

## 2020-01-15 PROCEDURE — 64493 INJ PARAVERT F JNT L/S 1 LEV: CPT | Performed by: ANESTHESIOLOGY

## 2020-01-15 NOTE — PROGRESS NOTES
Bilateral L3-5 Lumbar Medial Branch Blockade  [facility]      PREOPERATIVE DIAGNOSIS:  Lumbar spondylosis without myelopathy    POSTOPERATIVE DIAGNOSIS:  Lumbar spondylosis without myelopathy    PROCEDURE:   Diagnostic Bilateral Lumbar Medial Branch Nerve Blockades, with fluoroscopy:  L3, L4, and L5 nerves (at the L4 & L5 transverse processes and the sacral alar groove) to block facet joints L4-5, and L5-S1  1. 84479-78 -- Bilateral Lumbar Facet blocks, 1st Level  2. 85262-34 -- Bilateral Lumbar Facet blocks, 2nd  Level    PRE-PROCEDURE DISCUSSION WITH PATIENT:    Risks and complications were discussed with the patient prior to starting the procedure and informed consent was obtained.      SURGEON:  Nathaniel Hurst MD    REASON FOR PROCEDURE:    The patient complains of pain that seems to have a significant axial component and Tenderness of the affected facet joints on exam under fluoroscopy    SEDATION:  Versed 8mg IV  ANESTHETIC:  Marcaine 0.5%  STEROID:  Methylprednisolone (DEPO MEDROL) 60mg  TOTAL VOLUME OF SOLUTION:  6ml    DESCRIPTON OF PROCEDURE:  After obtaining informed consent, IV access was obtained in the preoperative area.   The patient was taken to the operating room.  The patient was placed in the prone position with a pillow under the abdomen. All pressure points were well padded.  EKG, blood pressure, and pulse oximeter were monitored.  The patient was monitored and sedated by the RN under my direction. The lumbosacral area was prepped with Chloraprep and draped in a sterile fashion. Under fluoroscopic guidance the transverse processes of the L4 and L5 vertebrae at the junctions of the superior articular processes were identified on the right. Also identified was the groove between the ala and the superior articular process of the sacrum on the ipsilateral side.  Skin and subcutaneous tissue were anesthetized with 1% lidocaine above each of these points. A 22-gauge spinal needle  was introduced under fluoroscopic guidance at the above junctions. Aspiration was negative for blood and CSF.  After confirming the position of the needle with fluoroscope in all views, 0.25 mL of Omnipaque was injected, and after seeing the proper spread a total of 1 mL of the anesthetic solution noted above was injected at each of these points.  Needles were removed intact from each of the areas.  A similar procedure was repeated to block the L3, L4, and L5 nerves on the contralateral side.   Onset of analgesia was noted.  Vital signs remained stable throughout.      ESTIMATED BLOOD LOSS:  <5 mL  SPECIMENS:  none    COMPLICATIONS:   No complications were noted., There was no indication of vascular uptake on live injection of contrast dye., There was not any evidence of dural puncture.   and The patient did not have any signs of postprocedure numbness nor weakness.    TOLERANCE & DISCHARGE CONDITION:    The patient tolerated the procedure well.  The patient was transported to the recovery area without difficulties.  The patient was discharged to home under the care of family in stable and satisfactory condition.    PLAN OF CARE:  1. The patient was given our standard instruction sheet.  2. We discussed that Lumbar Medial Branch Blockade is a diagnostic procedure in consideration for radiofrequency ablation if two diagnostic procedures prove to be positive for significant benefit.  If sustained relief of 6 to eight weeks is obtained, then an alternative plan could be therapeutic lumbar branch blockades.  3. The patient is asked to keep a pain log each hour for 8 hours after the procedure today.  4. The patient will  Return to clinic 1-2 wks.  5. The patient will resume all medications as per the medication reconciliation sheet.

## 2020-01-20 RX ORDER — ROSUVASTATIN CALCIUM 10 MG/1
TABLET, COATED ORAL
Qty: 90 TABLET | Refills: 3 | Status: SHIPPED | OUTPATIENT
Start: 2020-01-20 | End: 2021-02-15

## 2020-01-22 DIAGNOSIS — M54.9 CHRONIC BACK PAIN, UNSPECIFIED BACK LOCATION, UNSPECIFIED BACK PAIN LATERALITY: ICD-10-CM

## 2020-01-22 DIAGNOSIS — G89.29 CHRONIC BACK PAIN, UNSPECIFIED BACK LOCATION, UNSPECIFIED BACK PAIN LATERALITY: ICD-10-CM

## 2020-01-23 RX ORDER — CYCLOBENZAPRINE HCL 5 MG
5 TABLET ORAL 3 TIMES DAILY PRN
Qty: 30 TABLET | Refills: 0 | Status: SHIPPED | OUTPATIENT
Start: 2020-01-23 | End: 2020-03-23

## 2020-01-31 ENCOUNTER — OFFICE VISIT (OUTPATIENT)
Dept: PAIN MEDICINE | Facility: CLINIC | Age: 45
End: 2020-01-31

## 2020-01-31 VITALS
SYSTOLIC BLOOD PRESSURE: 111 MMHG | HEART RATE: 75 BPM | OXYGEN SATURATION: 98 % | HEIGHT: 69 IN | RESPIRATION RATE: 20 BRPM | WEIGHT: 191 LBS | TEMPERATURE: 97.4 F | DIASTOLIC BLOOD PRESSURE: 81 MMHG | BODY MASS INDEX: 28.29 KG/M2

## 2020-01-31 DIAGNOSIS — M51.36 DEGENERATIVE LUMBAR DISC: ICD-10-CM

## 2020-01-31 DIAGNOSIS — G89.29 OTHER CHRONIC PAIN: Primary | ICD-10-CM

## 2020-01-31 DIAGNOSIS — M47.816 LUMBAR FACET ARTHROPATHY: ICD-10-CM

## 2020-01-31 PROCEDURE — 99214 OFFICE O/P EST MOD 30 MIN: CPT | Performed by: NURSE PRACTITIONER

## 2020-01-31 NOTE — PROGRESS NOTES
CHIEF COMPLAINT  F/u back pain. Pt had Bilateral L3-5 Lumbar Medial Branch Blockade on 1/15/20. Pt sts receiving 80% x 3 days then pain returned to baseline. Pt would like to discuss restless leg syndrome, over the past 6 months, left leg jerks when he sleeping.     Initial evaluation by CIERA Gresham   Sadiq Alexander is a 44 y.o. male  who presents to the office for follow-up.He has a history of back pain. He completed Bilateral L3-L5 Lumbar MBB on 1/15/2020 by Dr. Hurst.  He reports 80% relief x 3 days and then his back pain returned to baseline. Today his pain is 3/10VAS.  He describes his pain as continuous aching and intermittent sharp shooting pain.  His pain is worsened by prolonged position, prolonged walking; it is improved by changing position, PT, MBB.    Patient continues with current physical therapy plan.     Back Pain   This is a chronic problem. The current episode started more than 1 year ago. The problem occurs constantly. The problem has been gradually worsening (variable since last office visit) since onset. The pain is present in the lumbar spine. The quality of the pain is described as aching. The pain does not radiate. The pain is at a severity of 3/10. Worse during: worsens as day progresses. The symptoms are aggravated by bending, position, standing and twisting. Associated symptoms include bowel incontinence, headaches (hx migraines) and weakness (back). Pertinent negatives include no bladder incontinence, chest pain, fever or numbness. He has tried chiropractic manipulation, heat, home exercises, analgesics, bed rest, ice, muscle relaxant, NSAIDs and walking (PT, MBB-significant diagnostic relief) for the symptoms. The treatment provided moderate relief.      PEG Assessment   What number best describes your pain on average in the past week?3  What number best describes how, during the past week, pain has interfered with your enjoyment of life?4  What number best describes  "how, during the past week, pain has interfered with your general activity?  3    The following portions of the patient's history were reviewed and updated as appropriate: allergies, current medications, past family history, past medical history, past social history, past surgical history and problem list.    Review of Systems   Constitutional: Positive for fatigue (occ). Negative for activity change, fever and unexpected weight change.   HENT: Negative for congestion.    Eyes: Negative for visual disturbance.   Respiratory: Negative for shortness of breath.    Cardiovascular: Negative for chest pain.   Gastrointestinal: Positive for bowel incontinence. Negative for abdominal distention, constipation, diarrhea, nausea (occ with migraines) and vomiting.   Endocrine: Negative for polyuria.   Genitourinary: Negative for bladder incontinence and difficulty urinating.   Musculoskeletal: Positive for back pain. Negative for gait problem and joint swelling.   Allergic/Immunologic: Negative for immunocompromised state.   Neurological: Positive for weakness (back) and headaches (hx migraines). Negative for dizziness, seizures, light-headedness and numbness.   Psychiatric/Behavioral: Positive for sleep disturbance (occ). Negative for agitation and suicidal ideas. The patient is not nervous/anxious.      Vitals:    01/31/20 1121   Resp: 20   Weight: 86.6 kg (191 lb)   Height: 175.3 cm (69.02\")   PainSc:   3   PainLoc: Back     Objective   Physical Exam   Constitutional: He is oriented to person, place, and time. He appears well-developed and well-nourished.   HENT:   Head: Normocephalic and atraumatic.   Eyes: Conjunctivae and EOM are normal.   Neck: Normal range of motion.   Cardiovascular: Normal rate.   Pulmonary/Chest: Effort normal.   Musculoskeletal:        Lumbar back: He exhibits decreased range of motion, tenderness and pain.   POS Lumbar Loading Maneuver     Neurological: He is alert and oriented to person, place, and " "time. Gait normal.   Skin: Skin is warm and dry.   Psychiatric: He has a normal mood and affect. His behavior is normal. Judgment and thought content normal.   Nursing note and vitals reviewed.    Assessment/Plan   Sadiq was seen today for back pain.    Diagnoses and all orders for this visit:    Other chronic pain    Degenerative lumbar disc    Lumbar facet arthropathy    --- Repeat Bilateral L3-L5 Medial Branch Block x 1 with goal of scheduling RFL   -------  Education about Medial Branch Blockade and RF Therapy:    This medial branch blockade (MBB) suggested is intended for diagnostic purposes, with the intent of offering the patient Radiofrequency thermal rhizotomy (RF) if the MBB is diagnostically effective.  The diagnostic blockade is necessary to determine the likelihood that RF therapy could be efficacious in providing long term relief to the patient.    Medial branches are sensory nerve branches that connect to a facet joint and transmit sensations & pain signals from that joint.  Facet is a term for the type of joints found in the spine.  Medial branches are the nerves that go to a facet, and therefore are also sometimes called \"facet joint nerves\" (FJNs).      In a medial branch blockade procedure, xray fluoroscopy is used to verify the locations of the outside of the joint lines which are being targeted.  Under xray guidance, needles are placed to these areas.  Contrast dye is injected to confirm proper placement, with dye flowing over the joint area, and to ensure that the dye does not flow into unintended areas such as a vein.  When this is confirmed, local anesthetic is injected to block the medial branch at that joint level.      If MBBs are diagnostically successful in blocking pain, then the patient is most likely a great candidate for Radiofrequency of those facet joint nerves.  In the RF procedure, needles are placed to the joint lines in the same fashion, and after testing, the needle tips are " heated to thermally treat the nerves, blocking the nerves by in essence damaging the nerves with the heat treatment.       Medically, a successful RF procedure should provide a patient with 50% pain relief or more for at least 6 months.  Clinical experience suggests that successful patients receive relief more in the range of 12 months on average.  We also discussed that a fortunate minority of patients receive therapeutic success from the MBB, and may not require RF ablation.  If a patient receives more than 8 weeks of relief from MBB, then occasional repeat MBB for therapeutic purposes is a very reasonable alternative therapy.  This course of therapy is consistent with our LCDs according to our CMS  in the area, and therefore other insurance providers should follow accordingly.  We will monitor our patients to screen for these therapeutic responders and will offer RF therapy only when necessary.      We discussed that MBB & RF are not without risks.  Guidelines regarding anticoagulant use & neuraxial procedures will be respected.  Patients that are ill or otherwise may be at risk for sepsis will not have their spines accessed by neuraxial injections of any type.  This patient will not be offered these therapies if there is an increased risk.   We discussed that there is a risk of postprocedural pain and also a risk of worsening of clinical picture with these procedures as with any neuraxial procedure.    -------  --- Follow-up after procedure     JEIMY REPORT  JEIMY report has been reviewed and scanned into the patient's chart.    As the clinician, I personally reviewed the JEIMY from 1/30/2020 while the patient was in the office today.    EMR Dragon/Transcription disclaimer:   Much of this encounter note is an electronic transcription/translation of spoken language to printed text. The electronic translation of spoken language may permit erroneous, or at times, nonsensical words or phrases to be  inadvertently transcribed; Although I have reviewed the note for such errors, some may still exist.

## 2020-02-21 ENCOUNTER — OUTSIDE FACILITY SERVICE (OUTPATIENT)
Dept: PAIN MEDICINE | Facility: CLINIC | Age: 45
End: 2020-02-21

## 2020-02-21 ENCOUNTER — DOCUMENTATION (OUTPATIENT)
Dept: PAIN MEDICINE | Facility: CLINIC | Age: 45
End: 2020-02-21

## 2020-02-21 PROCEDURE — 64493 INJ PARAVERT F JNT L/S 1 LEV: CPT | Performed by: ANESTHESIOLOGY

## 2020-02-21 PROCEDURE — 64494 INJ PARAVERT F JNT L/S 2 LEV: CPT | Performed by: ANESTHESIOLOGY

## 2020-02-21 NOTE — PROGRESS NOTES
Bilateral L3-5 Lumbar Medial Branch Blockade  [facility]      PREOPERATIVE DIAGNOSIS:  Lumbar spondylosis without myelopathy    POSTOPERATIVE DIAGNOSIS:  Lumbar spondylosis without myelopathy    PROCEDURE:   Diagnostic Bilateral Lumbar Medial Branch Nerve Blockades, with fluoroscopy:  L3, L4, and L5 nerves (at the L4 & L5 transverse processes and the sacral alar groove) to block facet joints L4-5, and L5-S1  1. 42281-78 -- Bilateral Lumbar Facet blocks, 1st Level  2. [id number] -- Bilateral Lumbar Facet blocks, 2nd  Level    PRE-PROCEDURE DISCUSSION WITH PATIENT:    Risks and complications were discussed with the patient prior to starting the procedure and informed consent was obtained.      SURGEON:  Nathaniel Hurst MD    REASON FOR PROCEDURE:    The patient complains of pain that seems to have a significant axial component, Previous diagnostic positivity of a Lumbar Medial Branch Blockade at the same levels and The last blockade was helpful with 80 % relief diagnostically.    SEDATION:  Patient declined administration of moderate sedation    ANESTHETIC:  Marcaine 0.5%  STEROID:  Methylprednisolone (DEPO MEDROL) 60mg  TOTAL VOLUME OF SOLUTION:  6ml    DESCRIPTON OF PROCEDURE:  After obtaining informed consent, IV access was not obtained in the preoperative area.   The patient was taken to the operating room.  The patient was placed in the prone position with a pillow under the abdomen. All pressure points were well padded.  EKG, blood pressure, and pulse oximeter were monitored.  The patient was monitored and sedated by the RN under my direction. The lumbosacral area was prepped with Chloraprep and draped in a sterile fashion. Under fluoroscopic guidance the transverse processes of the L4 and L5 vertebrae at the junctions of the superior articular processes were identified on the right. Also identified was the groove between the ala and the superior articular process of the sacrum on the  ipsilateral side.  Skin and subcutaneous tissue were anesthetized with 1% lidocaine above each of these points. A 22-gauge spinal needle was introduced under fluoroscopic guidance at the above junctions. Aspiration was negative for blood and CSF.  After confirming the position of the needle with fluoroscope in all views, 0.25 mL of Omnipaque was injected, and after seeing the proper spread a total of 1 mL of the anesthetic solution noted above was injected at each of these points.  Needles were removed intact from each of the areas.  A similar procedure was repeated to block the L3, L4, and L5 nerves on the contralateral side.   Onset of analgesia was noted.  Vital signs remained stable throughout.      ESTIMATED BLOOD LOSS:  <5 mL  SPECIMENS:  none    COMPLICATIONS:   No complications were noted., There was no indication of vascular uptake on live injection of contrast dye. and The patient did not have any signs of postprocedure numbness nor weakness.    TOLERANCE & DISCHARGE CONDITION:    The patient tolerated the procedure well.  The patient was transported to the recovery area without difficulties.  The patient was discharged to home under the care of family in stable and satisfactory condition.    PLAN OF CARE:  1. The patient was given our standard instruction sheet.  2. We discussed that Lumbar Medial Branch Blockade is a diagnostic procedure in consideration for radiofrequency ablation if two diagnostic procedures prove to be positive for significant benefit.  If sustained relief of 6 to eight weeks is obtained, then an alternative plan could be therapeutic lumbar branch blockades.  3. The patient is asked to keep a pain log each hour for 8 hours after the procedure today.  4. The patient will  Return to clinic 1-2 wks.  5. The patient will resume all medications as per the medication reconciliation sheet.

## 2020-02-27 ENCOUNTER — OFFICE VISIT (OUTPATIENT)
Dept: PAIN MEDICINE | Facility: CLINIC | Age: 45
End: 2020-02-27

## 2020-02-27 VITALS
DIASTOLIC BLOOD PRESSURE: 85 MMHG | OXYGEN SATURATION: 96 % | SYSTOLIC BLOOD PRESSURE: 119 MMHG | HEIGHT: 69 IN | TEMPERATURE: 97.2 F | WEIGHT: 192.8 LBS | RESPIRATION RATE: 16 BRPM | HEART RATE: 67 BPM | BODY MASS INDEX: 28.56 KG/M2

## 2020-02-27 DIAGNOSIS — M47.816 LUMBAR FACET ARTHROPATHY: Primary | ICD-10-CM

## 2020-02-27 DIAGNOSIS — M51.36 DEGENERATIVE LUMBAR DISC: ICD-10-CM

## 2020-02-27 DIAGNOSIS — G89.29 CHRONIC BACK PAIN, UNSPECIFIED BACK LOCATION, UNSPECIFIED BACK PAIN LATERALITY: Chronic | ICD-10-CM

## 2020-02-27 DIAGNOSIS — M54.9 CHRONIC BACK PAIN, UNSPECIFIED BACK LOCATION, UNSPECIFIED BACK PAIN LATERALITY: Chronic | ICD-10-CM

## 2020-02-27 PROCEDURE — 99213 OFFICE O/P EST LOW 20 MIN: CPT | Performed by: NURSE PRACTITIONER

## 2020-02-27 NOTE — PROGRESS NOTES
CHIEF COMPLAINT  F/U Back pain- Bilateral L3-5 Lumbar Medial Branch Blockade- patient states that his pain was improved 80% for 3 days and now his pain is improved 35%.     Subjective   Sadiq Alexander is a 44 y.o. male  who presents to the office for follow-up of procedure.  He completed a Bilateral L3-L5 lumbar medial branch blockade   on  2/21/2020 performed by Dr. Hurst for management of low back pain. Patient reports 80% relief from the procedure for 3 days and ONGOING 35% relief. Today his low back pain is 4/10VAS in severity.     Previous procedures:  1/15/20-Bilateral L3-L5 MBB-80% relief x 3 days     Back Pain   This is a chronic problem. The current episode started more than 1 year ago. The problem occurs constantly. The problem has been gradually worsening (variable since last office visit) since onset. The pain is present in the lumbar spine. The quality of the pain is described as aching. The pain does not radiate. The pain is at a severity of 4/10. Worse during: worsens as day progresses. The symptoms are aggravated by bending, position, standing and twisting. Associated symptoms include bowel incontinence, headaches (hx migraines) and weakness (back). Pertinent negatives include no bladder incontinence, chest pain, dysuria, fever or numbness. He has tried chiropractic manipulation, heat, home exercises, analgesics, bed rest, ice, muscle relaxant, NSAIDs and walking (PT, MBB-significant diagnostic relief) for the symptoms. The treatment provided moderate relief.      PEG Assessment   What number best describes your pain on average in the past week?4  What number best describes how, during the past week, pain has interfered with your enjoyment of life?6  What number best describes how, during the past week, pain has interfered with your general activity?  4    The following portions of the patient's history were reviewed and updated as appropriate: allergies, current medications, past family history,  "past medical history, past social history, past surgical history and problem list.    Review of Systems   Constitutional: Positive for activity change (decreased) and fatigue (occ). Negative for fever and unexpected weight change.   HENT: Negative for congestion.    Eyes: Negative for visual disturbance.   Respiratory: Negative for shortness of breath.    Cardiovascular: Negative for chest pain.   Gastrointestinal: Positive for bowel incontinence. Negative for abdominal distention, constipation, diarrhea, nausea (occ with migraines) and vomiting.   Endocrine: Negative for polyuria.   Genitourinary: Positive for difficulty urinating. Negative for bladder incontinence and dysuria.   Musculoskeletal: Positive for back pain. Negative for gait problem and joint swelling.   Allergic/Immunologic: Negative for immunocompromised state.   Neurological: Positive for weakness (back) and headaches (hx migraines). Negative for dizziness, seizures, light-headedness and numbness.   Psychiatric/Behavioral: Positive for sleep disturbance (occ). Negative for agitation and suicidal ideas. The patient is not nervous/anxious.      Vitals:    02/27/20 0758   BP: 119/85   Pulse: 67   Resp: 16   Temp: 97.2 °F (36.2 °C)   SpO2: 96%   Weight: 87.5 kg (192 lb 12.8 oz)   Height: 175.3 cm (69\")   PainSc:   4   PainLoc: Back     Objective   Physical Exam   Constitutional: He is oriented to person, place, and time. He appears well-developed and well-nourished.   HENT:   Head: Normocephalic and atraumatic.   Eyes: Conjunctivae and EOM are normal.   Neck: Normal range of motion.   Cardiovascular: Normal rate.   Pulmonary/Chest: Effort normal.   Musculoskeletal:        Lumbar back: He exhibits decreased range of motion, tenderness and pain.   POS Lumbar Loading Maneuver     Neurological: He is alert and oriented to person, place, and time. Gait normal.   Skin: Skin is warm and dry.   Psychiatric: He has a normal mood and affect. His behavior is " normal. Judgment and thought content normal.   Nursing note and vitals reviewed.    Assessment/Plan   Sadiq was seen today for back pain.    Diagnoses and all orders for this visit:    Lumbar facet arthropathy  -     Case Request    Degenerative lumbar disc    Chronic back pain, unspecified back location, unspecified back pain laterality    --- Bilateral L3-L5 Radiofrequency ablation  --------  Education about Radiofrequency Lesioning of Medial Branches:    The medial branch blockade was intended for diagnostic purposes, with the intent of offering the patient Radiofrequency thermal rhizotomy if the MBB is diagnostically effective.  The diagnostic blockade is necessary to determine the likelihood that RF therapy could be efficacious in providing long term relief to the patient.  As indicated above, diagnostic efficacy was established.      In the RF procedure, needles are placed to the joint lines in the same fashion, and after testing, the needle tips are heated to thermally treat the nerves, blocking the nerves by in essence damaging the nerves with the heat treatment.      Medically, a successful RF procedure should provide a patient with 50% pain relief or more for at least 6 months.  We estimate a likelihood of about an 80% chance that medical success will be realized.  We discussed & educated once again that not all patients have a medically successful result, and the patient voices understanding.  However, our clinical experience suggests that successful patients receive relief more in the range of 12 months on average.  (We also discussed that a fortunate minority of patients receive therapeutic success from the MBB, and may not require RF ablation.  If a patient receives more than 8 weeks of relief from MBB, then occasional repeat MBB for therapeutic purposes is a very reasonable alternative therapy.  This course of therapy is consistent with our LCDs according to our CMS  in the area, and  therefore other insurance providers should follow accordingly.  We will monitor our patients to screen for these therapeutic responders and will offer RF therapy only when necessary.  However, in this clinical scenario, this therapeutic result was not realized, and therefore Radiofrequency Lesioning is medically necessary.)      We discussed that MBB & RF are not without risks.  Guidelines regarding anticoagulant use & neuraxial procedures will be respected.  Patients that are ill or otherwise may be at risk for sepsis will not have their spines accessed by neuraxial injections of any type.  This patient will not be offered these therapies if there is an increased risk.   We discussed that there is a risk of postprocedural pain and also a risk of worsening of clinical picture with these procedures as with any neuraxial procedure.  All invasive procedures have risks including but not limited to bleeding, infection, injury, nerve injury, paralysis, coma, death, lack of pain relief, and worsening of clinical picture.      In this education session, all of these topics were covered and the patient voiced understanding.    ---------  --- Follow-up after procedure     JEIMY REPORT  JEIMY report has been reviewed and scanned into the patient's chart.    As the clinician, I personally reviewed the JEIMY from 2/26/20 while the patient was in the office today.    EMR Dragon/Transcription disclaimer:   Much of this encounter note is an electronic transcription/translation of spoken language to printed text. The electronic translation of spoken language may permit erroneous, or at times, nonsensical words or phrases to be inadvertently transcribed; Although I have reviewed the note for such errors, some may still exist.

## 2020-03-16 DIAGNOSIS — G43.909 MIGRAINE WITHOUT STATUS MIGRAINOSUS, NOT INTRACTABLE, UNSPECIFIED MIGRAINE TYPE: ICD-10-CM

## 2020-03-17 RX ORDER — ZOLMITRIPTAN 5 MG/1
5 TABLET, ORALLY DISINTEGRATING ORAL 2 TIMES DAILY PRN
Qty: 9 TABLET | Refills: 5 | Status: SHIPPED | OUTPATIENT
Start: 2020-03-17 | End: 2021-12-02

## 2020-03-20 ENCOUNTER — TELEPHONE (OUTPATIENT)
Dept: PAIN MEDICINE | Facility: CLINIC | Age: 45
End: 2020-03-20

## 2020-03-23 DIAGNOSIS — M54.9 CHRONIC BACK PAIN, UNSPECIFIED BACK LOCATION, UNSPECIFIED BACK PAIN LATERALITY: ICD-10-CM

## 2020-03-23 DIAGNOSIS — G89.29 CHRONIC BACK PAIN, UNSPECIFIED BACK LOCATION, UNSPECIFIED BACK PAIN LATERALITY: ICD-10-CM

## 2020-03-23 RX ORDER — CYCLOBENZAPRINE HCL 5 MG
5 TABLET ORAL 3 TIMES DAILY PRN
Qty: 30 TABLET | Refills: 0 | Status: SHIPPED | OUTPATIENT
Start: 2020-03-23 | End: 2020-06-01

## 2020-04-13 ENCOUNTER — TELEPHONE (OUTPATIENT)
Dept: INTERNAL MEDICINE | Age: 45
End: 2020-04-13

## 2020-04-13 NOTE — TELEPHONE ENCOUNTER
----- Message from Sadiq Alexander sent at 4/13/2020 12:44 PM EDT -----  Regarding: Non-Urgent Medical Question  Contact: [phone]  Any concern with me adding a prostate health supplement? I researched against current medications for contraindications and found none.     Starting with saw palmetto as active ingredient.

## 2020-04-30 DIAGNOSIS — K21.00 GERD WITH ESOPHAGITIS: Chronic | ICD-10-CM

## 2020-04-30 RX ORDER — PANTOPRAZOLE SODIUM 40 MG/1
TABLET, DELAYED RELEASE ORAL
Qty: 30 TABLET | Refills: 5 | Status: SHIPPED | OUTPATIENT
Start: 2020-04-30 | End: 2020-12-15

## 2020-05-20 ENCOUNTER — OFFICE VISIT (OUTPATIENT)
Dept: INTERNAL MEDICINE | Age: 45
End: 2020-05-20

## 2020-05-20 VITALS
TEMPERATURE: 97.2 F | WEIGHT: 193 LBS | HEART RATE: 66 BPM | BODY MASS INDEX: 28.58 KG/M2 | HEIGHT: 69 IN | DIASTOLIC BLOOD PRESSURE: 70 MMHG | OXYGEN SATURATION: 99 % | SYSTOLIC BLOOD PRESSURE: 110 MMHG

## 2020-05-20 DIAGNOSIS — F41.9 ANXIETY DISORDER, UNSPECIFIED TYPE: Chronic | ICD-10-CM

## 2020-05-20 DIAGNOSIS — M54.9 CHRONIC BACK PAIN, UNSPECIFIED BACK LOCATION, UNSPECIFIED BACK PAIN LATERALITY: Primary | ICD-10-CM

## 2020-05-20 DIAGNOSIS — G43.119 INTRACTABLE MIGRAINE WITH AURA WITHOUT STATUS MIGRAINOSUS: Chronic | ICD-10-CM

## 2020-05-20 DIAGNOSIS — G89.29 CHRONIC BACK PAIN, UNSPECIFIED BACK LOCATION, UNSPECIFIED BACK PAIN LATERALITY: Primary | ICD-10-CM

## 2020-05-20 DIAGNOSIS — M51.36 DEGENERATIVE LUMBAR DISC: Chronic | ICD-10-CM

## 2020-05-20 DIAGNOSIS — M47.816 LUMBAR FACET ARTHROPATHY: ICD-10-CM

## 2020-05-20 PROBLEM — M51.369 DEGENERATIVE LUMBAR DISC: Chronic | Status: ACTIVE | Noted: 2019-11-11

## 2020-05-20 PROCEDURE — 99214 OFFICE O/P EST MOD 30 MIN: CPT | Performed by: INTERNAL MEDICINE

## 2020-05-20 RX ORDER — DULOXETIN HYDROCHLORIDE 30 MG/1
30 CAPSULE, DELAYED RELEASE ORAL DAILY
Qty: 30 CAPSULE | Refills: 0 | Status: SHIPPED | OUTPATIENT
Start: 2020-05-20 | End: 2020-06-15 | Stop reason: SDUPTHER

## 2020-05-20 NOTE — ASSESSMENT & PLAN NOTE
S/p 2 facet injections with only limited improvement  Waiting for ablation therapy  Will try duloxetine 30/60 for pain.

## 2020-05-20 NOTE — PATIENT INSTRUCTIONS
** IMPORTANT MESSAGE FROM DR. THACKER **    In our office, your satisfaction is VERY important to us.     You may receive a survey from [facility] by mail or E-mail for you to provide feedback about your visit. This information is invaluable for me to know what we can do to improve our services.     I ask that you please take a few minutes to complete the survey and let us know how we are doing in serving your needs. (You may receive the survey more than once for multiple visits)    Thank You !    Dr. Thacker & Staff    _________________________________________________________________________________________________________________________      ** ADDITIONAL INSTRUCTION / REMINDERS FROM DR. THACKER **    Discuss with neurologist about Ubrelvy (for migraine abortive therapy).

## 2020-05-20 NOTE — PROGRESS NOTES
[facility] INTERNAL MEDICINE  SEJAL THACKER M.D.      Sadiq HERNANDES Alexander / 44 y.o. / male  05/20/2020      CHIEF COMPLAINT     chronic back pain and Anxiety      ASSESSMENT & PLAN     Problem List Items Addressed This Visit        High    Degenerative lumbar disc (Chronic)    Chronic back pain - Primary (Chronic)    Overview     Lumbar herniated disc  Facet arthropathy         Current Assessment & Plan     S/p 2 facet injections with only limited improvement  Waiting for ablation therapy  Will try duloxetine 30/60 for pain.          Relevant Medications    cyclobenzaprine (FLEXERIL) 5 MG tablet    DULoxetine (CYMBALTA) 30 MG capsule    Lumbar facet arthropathy (Chronic)    Relevant Medications    DULoxetine (CYMBALTA) 30 MG capsule       Medium    Anxiety disorder (Chronic)    Overview     H/o ADHD.          Current Assessment & Plan     Will start duloxetine for back pain.   Titrate off escitalopram over 2 weeks.          Relevant Medications    DULoxetine (CYMBALTA) 30 MG capsule       Low    Migraines (Chronic)    Overview     *Neuro (Mesha)    On Ajovy         Current Assessment & Plan     Discuss with neuro regarding Ubrelvy.            Relevant Medications    Fremanezumab-vfrm 225 MG/1.5ML solution prefilled syringe    ZOLMitriptan (ZOMIG-ZMT) 5 MG disintegrating tablet    cyclobenzaprine (FLEXERIL) 5 MG tablet    diclofenac (VOLTAREN) 50 MG EC tablet    DULoxetine (CYMBALTA) 30 MG capsule        No orders of the defined types were placed in this encounter.    New Medications Ordered This Visit   Medications   • DULoxetine (CYMBALTA) 30 MG capsule     Sig: Take 1 capsule by mouth Daily.     Dispense:  30 capsule     Refill:  0       Summary/Discussion:      Next Appointment with me: Visit date not found    Return in about 2 months (around 7/20/2020) for TELE-HEALTH VISIT, Reassess today's problem(s).      MEDICATIONS     Current Outpatient Medications   Medication Sig Dispense Refill   • cyclobenzaprine (FLEXERIL) 5 MG  "tablet TAKE 1 TABLET BY MOUTH 3 (THREE) TIMES A DAY AS NEEDED FOR MUSCLE SPASMS. 30 tablet 0   • diclofenac (VOLTAREN) 50 MG EC tablet TAKE 1 TABLET BY MOUTH 2 (TWO) TIMES A DAY AS NEEDED (PAIN). TAKE WITH FOOD 60 tablet 0   • fluticasone (FLONASE) 50 MCG/ACT nasal spray 2 sprays into each nostril Daily. (Patient taking differently: 2 sprays into the nostril(s) as directed by provider Daily As Needed.)     • Fremanezumab-vfrm 225 MG/1.5ML solution prefilled syringe Inject 1.5 mL under the skin into the appropriate area as directed Every 30 (Thirty) Days. 1.5 mL 11   • hydrocortisone (ANUSOL-HC) 25 MG suppository 1 per rectum at bedtime as needed for hemorrhoidal bleeding 10 suppository 5   • ondansetron (ZOFRAN) 8 MG tablet Take 8 mg by mouth Every 8 (Eight) Hours As Needed.     • pantoprazole (PROTONIX) 40 MG EC tablet TAKE 1 TABLET BY MOUTH EVERY DAY 30 tablet 5   • rosuvastatin (CRESTOR) 10 MG tablet TAKE 1 TABLET BY MOUTH EVERY DAY 90 tablet 3   • ZOLMitriptan (ZOMIG-ZMT) 5 MG disintegrating tablet TAKE 1 TABLET BY MOUTH 2 (TWO) TIMES A DAY AS NEEDED FOR MIGRAINE. 9 tablet 5   • Escitalopram 10 mg qd   30 capsule 0     No current facility-administered medications for this visit.           VITAL SIGNS     Visit Vitals  /70   Pulse 66   Temp 97.2 °F (36.2 °C)   Ht 175.3 cm (69\")   Wt 87.5 kg (193 lb)   SpO2 99%   BMI 28.50 kg/m²       BP Readings from Last 3 Encounters:   05/20/20 110/70   02/27/20 119/85   01/31/20 111/81     Wt Readings from Last 3 Encounters:   05/20/20 87.5 kg (193 lb)   02/27/20 87.5 kg (192 lb 12.8 oz)   01/31/20 86.6 kg (191 lb)      Body mass index is 28.5 kg/m².      HISTORY OF PRESENT ILLNESS     Lumbar herniated disc/facet arthropathy s/p 2 facet injections without significant improvement. Awaiting ablation therapy.   MILENA: on escitalopram 10 mg stable.   Migraines overall same on Ajovy. Takes Zomig as needed.       Patient Care Team:  Charly Jackson MD as PCP - General (Internal " Medicine)  Jordyn Frazier APRN as Nurse Practitioner (Pain Medicine)  Nathaniel Hurst MD as Consulting Physician (Pain Medicine)  Tommy Zimmer APRN as Nurse Practitioner (Neurology)      REVIEW OF SYSTEMS     Review of Systems  Constitutional neg  MuSk chronic back pain  Neuro migraines  Psych anxiety       PHYSICAL EXAMINATION     Physical Exam  Constitutional  No distress  Psychiatric  Alert. Judgment intact. Thought content normal. Mood normal      REVIEWED DATA     Labs:     Lab Results   Component Value Date     08/12/2019    K 4.4 08/12/2019    CALCIUM 8.9 08/12/2019    AST 20 08/12/2019    ALT 18 08/12/2019    BUN 15 08/12/2019    CREATININE 1.21 08/12/2019    CREATININE 1.39 (H) 02/05/2019    CREATININE 1.23 06/20/2018    EGFRIFNONA 65 08/12/2019    EGFRIFAFRI 79 08/12/2019       Lab Results   Component Value Date    HGBA1C 5.51 06/20/2018     (H) 08/12/2019     (H) 06/20/2018       Lab Results   Component Value Date    LDL 99 08/12/2019    LDL 86 09/21/2018     (H) 06/20/2018    HDL 41 08/12/2019    HDL 35 (L) 09/21/2018    HDL 42 06/20/2018    TRIG 114 08/12/2019    TRIG 102 09/21/2018    TRIG 208 (H) 06/20/2018    CHOLHDLRATIO 3.98 08/12/2019    CHOLHDLRATIO 4.0 09/21/2018    CHOLHDLRATIO 6.14 06/20/2018       Lab Results   Component Value Date    TSH 0.867 06/20/2018    FREET4 1.10 06/20/2018       Lab Results   Component Value Date    WBC 6.83 02/05/2019    HGB 14.6 02/05/2019    HGB 15.3 06/20/2018    HGB 15.6 02/13/2015     02/05/2019       Lab Results   Component Value Date    PROTEIN Negative 06/20/2018    GLUCOSEU Negative 06/20/2018    BLOODU Negative 06/20/2018    NITRITEU Negative 06/20/2018    LEUKOCYTESUR Negative 06/20/2018       Imaging:   MRI OF THE LUMBAR SPINE WITHOUT CONTRAST      CLINICAL HISTORY: Low back pain radiating into both hips for 20 years.  Re-injured back 1 month ago. Follow-up synovial cyst from previous MRI.     TECHNIQUE:   MRI of the lumbar spine was obtained with sagittal T1,  proton-density, and T2-weighted images. Additionally, there are axial T1  and T2-weighted images through the lumbar spine.     COMPARISON: Comparison is made to previous MRI of the lumbar spine dated  01/25/2017.     FINDINGS:  There is loss of the usual lordotic curvature of the lumbar spine and  minimal levoconvex scoliotic curvature is seen centered at L4.     The conus medullaris terminates at the L1-2 level and has normal signal  intensity.     At L1-2, there is no significant degree of canal or foraminal narrowing.  There is a disc osteophyte complex minimally indenting the ventral  subarachnoid space. Mild facet arthropathy is identified. Prominent  degenerative disc changes are identified at L1-2.     At L2-3, there is mild facet arthropathy but no significant canal or  foraminal narrowing is identified.     At L3-4, there is mild-to-moderate facet arthropathy. There is mild disc  bulging which is eccentric to the left and this results in a mild degree  of left and a minimal degree of right foraminal narrowing. The bulging  disc material approaches and may abut the already exited left L3 nerve  root. Similar findings are noted on the prior examination. Minimal canal  stenosis is identified at the L3-4 level. Again, these findings are  stable.     At L4-5, there is a disc bulge with an annular fissure. The bulging disc  material is eccentric to the left and this results in a mild degree of  left foraminal narrowing. The bulging disc material approaches and may  abut the already exited left L4 nerve root. Mild-to-moderate facet  arthropathy is seen. Again no significant interval change is identified  when compared to the prior exam.     At L5-S1, there is moderate facet arthropathy. There are synovial cysts  noted arising from the left facet joint and these project laterally into  the posterior paravertebral musculature. There is mild foraminal  narrowing  secondary to bulging disc material. Again, the findings are  unchanged when compared to the prior exam.     IMPRESSION:  When compared to the prior study, there is no significant interval  change. There is loss of the usual lordotic curvature of the lumbar  spine and minimal levoconvex scoliotic curvature is seen with its apex  centered at L4.     At L3-4 and L4-5, there is bulging disc material eccentric to the left  which approaches and may abut the already exited left L3 and L4 nerve  roots respectively.     This report was finalized on 12/2/2019       Medical Tests:         Summary of old records / correspondence / consultant report:         Request outside records:         ALLERGIES  No Known Allergies     PFSH:     The following portions of the patient's history were reviewed and updated as appropriate: Allergies / Current Medications / Past Medical History / Surgical History / Social History / Family History    PROBLEM LIST   Patient Active Problem List   Diagnosis   • Colon polyp   • Internal hemorrhoids   • Migraines   • Nicotine dependence   • Anxiety disorder   • Hyperlipidemia   • Irritable bowel syndrome with both constipation and diarrhea   • GERD with esophagitis   • Restless legs syndrome (RLS)   • Other chronic pain   • Degenerative lumbar disc   • Synovial cyst of lumbar facet joint   • Chronic back pain   • Lumbar facet arthropathy       PAST MEDICAL HISTORY  Past Medical History:   Diagnosis Date   • Anxiety    • Arthritis    • Colon polyps    • Colon polyps    • Elevated cholesterol    • GERD (gastroesophageal reflux disease)    • Hyperlipidemia    • Migraines        SURGICAL HISTORY  Past Surgical History:   Procedure Laterality Date   • CHOLECYSTECTOMY WITH INTRAOPERATIVE CHOLANGIOGRAM N/A 2/5/2019    Procedure: CHOLECYSTECTOMY LAPAROSCOPIC INTRAOPERATIVE CHOLANGIOGRAM;  Surgeon: Reji Hoffman MD;  Location: [facility];  Service: General   • COLONOSCOPY N/A 03/11/2015    Two 4-6 mm  polyps at the recto-sigmoid and in the mid ascending colon (PATH: tubulovillous adenoma w/ low-grade dysplasia & hyperplastic colon polyp at 20 cm), internal hemorrhoids; Dr. Chato Allen   • REFRACTIVE SURGERY Bilateral    • SHOULDER SURGERY Bilateral ,    Left Shoulder  Dr. Mehta, Right Shoulder  Dr. Mehta       SOCIAL HISTORY  Social History     Socioeconomic History   • Marital status: Single     Spouse name: Doris   • Number of children: 3   • Years of education: Not on file   • Highest education level: Not on file   Occupational History   • Occupation: Software consulting   Tobacco Use   • Smoking status: Former Smoker     Packs/day: 0.75     Years: 0.00     Pack years: 0.00     Start date: 3/8/2019     Last attempt to quit: 2019     Years since quittin.7   • Smokeless tobacco: Former User     Types: Chew     Quit date: 3/8/2019   • Tobacco comment: when he drinks   Substance and Sexual Activity   • Alcohol use: Yes     Alcohol/week: 3.0 standard drinks     Types: 1 Glasses of wine, 1 Cans of beer, 1 Shots of liquor per week     Frequency: 2-3 times a week     Drinks per session: 1 or 2     Comment: drinks occasionally 1-2 drinks per week   • Drug use: Yes     Types: Marijuana     Comment: used marijuana in the past in college   • Sexual activity: Yes     Partners: Female       FAMILY HISTORY  Family History   Problem Relation Age of Onset   • Melanoma Father    • Depression Father    • No Known Problems Sister    • Parkinsonism Maternal Grandmother    • Lung cancer Maternal Grandfather         smoker   • [place]'s disease Maternal Grandfather    • Lung cancer Paternal Grandmother         smoker   • Lung cancer Paternal Aunt         smoker   • Cancer Maternal Aunt         spine   • Heart attack Paternal Uncle 55   • Colon cancer Neg Hx    • Prostate cancer Neg Hx          **Dragon Disclaimer:   Much of this encounter note is an electronic transcription/translation of  spoken language to printed text. The electronic translation of spoken language may permit erroneous, or at times, nonsensical words or phrases to be inadvertently transcribed. Although I have reviewed the note for such errors, some may still exist.     Template created by Kristal Jackson MD

## 2020-05-25 ENCOUNTER — LAB REQUISITION (OUTPATIENT)
Dept: LAB | Facility: HOSPITAL | Age: 45
End: 2020-05-25

## 2020-05-25 DIAGNOSIS — Z00.00 ENCOUNTER FOR GENERAL ADULT MEDICAL EXAMINATION WITHOUT ABNORMAL FINDINGS: ICD-10-CM

## 2020-05-25 PROCEDURE — U0004 COV-19 TEST NON-CDC HGH THRU: HCPCS | Performed by: ANESTHESIOLOGY

## 2020-05-26 LAB
REF LAB TEST METHOD: NORMAL
SARS-COV-2 RNA RESP QL NAA+PROBE: NOT DETECTED

## 2020-05-27 ENCOUNTER — OUTSIDE FACILITY SERVICE (OUTPATIENT)
Dept: PAIN MEDICINE | Facility: CLINIC | Age: 45
End: 2020-05-27

## 2020-05-27 ENCOUNTER — DOCUMENTATION (OUTPATIENT)
Dept: PAIN MEDICINE | Facility: CLINIC | Age: 45
End: 2020-05-27

## 2020-05-27 PROCEDURE — 64636 DESTROY L/S FACET JNT ADDL: CPT | Performed by: ANESTHESIOLOGY

## 2020-05-27 PROCEDURE — 99152 MOD SED SAME PHYS/QHP 5/>YRS: CPT | Performed by: ANESTHESIOLOGY

## 2020-05-27 PROCEDURE — 64635 DESTROY LUMB/SAC FACET JNT: CPT | Performed by: ANESTHESIOLOGY

## 2020-05-27 NOTE — PROGRESS NOTES
Bilateral L3-5 Lumbar Medial Branch RADIOFREQUENCY  [facility]      PREOPERATIVE DIAGNOSIS:  Lumbar spondylosis without myelopathy    POSTOPERATIVE DIAGNOSIS:  Lumbar spondylosis without myelopathy    PROCEDURE:   Diagnostic Bilateral Lumbar Medial Branch Nerve thermal radiofrequency lesioning, with fluoroscopy:  L3, L4, and L5 nerves (at the L4 and L5 transverse processes and the sacral alar groove) to thermally treat the innervation to facet joints L4-5 and L5-S1  1. 83928-15 -- Bilateral L/S facet neuro destr., 1st Level  2. 45443-49 -- Bilateral L/S facet neuro destr., 2nd  Level    PRE-PROCEDURE DISCUSSION WITH PATIENT:    Risks and complications were discussed with the patient prior to starting the procedure and informed consent was obtained.      SURGEON:  Nathaniel Hurst MD    REASON FOR PROCEDURE:    Previous diagnostic positivity of two Lumbar Medial Branch Blockades at the same levels    SEDATION:  Fentanyl 100 mcg IV  TIME OF PROCEDURE:   The intraoperative procedure time after administration of the sedative was 18 minutes.     ANESTHETIC:  Marcaine 0.5%  STEROID:  NONE      DESCRIPTON OF PROCEDURE:  After obtaining informed consent, IV access  was obtained in the preoperative area.   The patient was taken to the operating room.  The patient was placed in the prone position with a pillow under the abdomen. All pressure points were well padded.  EKG, blood pressure, and pulse oximeter were monitored.  The patient was monitored and sedated by the RN under my direction. The lumbosacral area was prepped with Chloraprep and draped in a sterile fashion.     Under fluoroscopic guidance the transverse processes of the L4 and L5 vertebrae at the junctions of the superior articular processes were identified on the right.  Also identified was the groove between the ala and the superior articular process of the sacrum on the ipsilateral side.  Skin and subcutaneous tissue were anesthetized with  1ml of 1% lidocaine above each of these points. Then, radiofrequency probe needles were advanced in this fluoro view to the above junctions.  Aspiration was negative for blood and CSF.  After confirming the position of the needle with fluoroscope in all views, testing was initiated.  First, sensory testing was started on each needle a 1V and 50Hz and slowly decreased until painful pressure stimulation diminished at 0.5V.  Next, motor testing was confirmed to be negative at 3V and 2Hz for any radicular stimulation.  Then 1mL of the local anesthetic was instilled in each needle.  Two minutes elapsed, and during this time a lateral fluoroscopic view was confirmed again to ensure the needles had not advanced nor retracted.  Then, Radiofrequency Lesioning was initiated for 2.5 minutes at 85 degrees Celsius.  Needles were removed intact from each of the areas.     A similar procedure was repeated to address the L3, L4, and L5 nerves on the contralateral side.   Onset of analgesia was noted.  Vital signs remained stable throughout.      ESTIMATED BLOOD LOSS:  <5 mL  SPECIMENS:  none    COMPLICATIONS:   No complications were noted. and The patient did not have any signs of postprocedure numbness nor weakness.    TOLERANCE & DISCHARGE CONDITION:    The patient tolerated the procedure well.  The patient was transported to the recovery area without difficulties.  The patient was discharged to home under the care of family in stable and satisfactory condition.    PLAN OF CARE:  1. The patient was given our standard instruction sheet.  2. The patient will  Return to clinic 6 wks.  3. The patient will resume all medications as per the medication reconciliation sheet.

## 2020-05-30 DIAGNOSIS — M54.9 CHRONIC BACK PAIN, UNSPECIFIED BACK LOCATION, UNSPECIFIED BACK PAIN LATERALITY: ICD-10-CM

## 2020-05-30 DIAGNOSIS — G89.29 CHRONIC BACK PAIN, UNSPECIFIED BACK LOCATION, UNSPECIFIED BACK PAIN LATERALITY: ICD-10-CM

## 2020-06-01 RX ORDER — CYCLOBENZAPRINE HCL 5 MG
5 TABLET ORAL 3 TIMES DAILY PRN
Qty: 30 TABLET | Refills: 0 | Status: SHIPPED | OUTPATIENT
Start: 2020-06-01 | End: 2020-07-07 | Stop reason: SDUPTHER

## 2020-06-13 DIAGNOSIS — M54.9 CHRONIC BACK PAIN, UNSPECIFIED BACK LOCATION, UNSPECIFIED BACK PAIN LATERALITY: ICD-10-CM

## 2020-06-13 DIAGNOSIS — G89.29 CHRONIC BACK PAIN, UNSPECIFIED BACK LOCATION, UNSPECIFIED BACK PAIN LATERALITY: ICD-10-CM

## 2020-06-13 DIAGNOSIS — M47.816 LUMBAR FACET ARTHROPATHY: ICD-10-CM

## 2020-06-15 RX ORDER — DULOXETIN HYDROCHLORIDE 30 MG/1
CAPSULE, DELAYED RELEASE ORAL
Qty: 30 CAPSULE | Refills: 5 | Status: ON HOLD | OUTPATIENT
Start: 2020-06-15 | End: 2021-11-18

## 2020-07-06 ENCOUNTER — TELEPHONE (OUTPATIENT)
Dept: NEUROLOGY | Facility: CLINIC | Age: 45
End: 2020-07-06

## 2020-07-07 DIAGNOSIS — M54.9 CHRONIC BACK PAIN, UNSPECIFIED BACK LOCATION, UNSPECIFIED BACK PAIN LATERALITY: ICD-10-CM

## 2020-07-07 DIAGNOSIS — G89.29 CHRONIC BACK PAIN, UNSPECIFIED BACK LOCATION, UNSPECIFIED BACK PAIN LATERALITY: ICD-10-CM

## 2020-07-07 RX ORDER — CYCLOBENZAPRINE HCL 5 MG
5 TABLET ORAL 3 TIMES DAILY PRN
Qty: 30 TABLET | Refills: 0 | Status: SHIPPED | OUTPATIENT
Start: 2020-07-07 | End: 2020-07-29 | Stop reason: DRUGHIGH

## 2020-07-10 ENCOUNTER — TELEMEDICINE (OUTPATIENT)
Dept: INTERNAL MEDICINE | Age: 45
End: 2020-07-10

## 2020-07-10 DIAGNOSIS — Z20.822 CLOSE EXPOSURE TO COVID-19 VIRUS: Primary | ICD-10-CM

## 2020-07-10 PROCEDURE — 99213 OFFICE O/P EST LOW 20 MIN: CPT | Performed by: INTERNAL MEDICINE

## 2020-07-10 NOTE — PROGRESS NOTES
[facility] INTERNAL MEDICINE  SEJAL JACKSON M.D.      Sadiq HERNANDES Alexander / 44 y.o. / male  07/10/2020      CC:  Main reason(s) for today's visit: TELEHEALTH ENCOUNTER: Exposed to COVID+ family member since last Friday; remains a      HPI:     THIS WAS A MYCHART TELEHEALTH ENCOUNTER NECESSITATED BY CURRENT COVID-19 CRISIS.      You have chosen to receive care through a telehealth visit.  Do you consent to use a video/audio connection for your medical care today? Yes      Step-daughter returned home from [place] last Friday. She was tested for COVID-19 last Tuesday but result was not available until yesterday. She is reported totally asymptomatic. Patient himself is asymptomatic. ROS Negative for fever or chills, sore throat, congestion, loss of taste/smell, cough, chest pain, shortness of breath, nausea/vomiting/diarrhea, headaches or myalgia.       Patient Care Team:  Charly Jackson MD as PCP - General (Internal Medicine)  Jordyn Frazier APRN as Nurse Practitioner (Pain Medicine)  Nathaniel Hurst MD as Consulting Physician (Pain Medicine)  Tommy Zimmer APRN as Nurse Practitioner (Neurology)    ASSESSMENT & PLAN:    1. Close exposure to COVID-19 virus  Recommended screening for COVID-19 at [facility] (direct close exposure to + COVID family member)  Will place order in system   Quarantine 14 days from 1st date of exposure and self-monitor for fever, symptoms.        Summary/Discussion:  •       Time spent: 15 minutes    Next Appointment with me: Visit date not found    Return for worsening or lack of improvement.    ____________________________________________________________________    MEDICATIONS  Current Outpatient Medications   Medication Sig Dispense Refill   • cyclobenzaprine (FLEXERIL) 5 MG tablet Take 1 tablet by mouth 3 (Three) Times a Day As Needed for Muscle Spasms. 30 tablet 0   • diclofenac (VOLTAREN) 50 MG EC tablet TAKE 1 TABLET BY MOUTH 2 (TWO) TIMES A DAY AS NEEDED (PAIN). TAKE WITH FOOD 60 tablet 3    • DULoxetine (CYMBALTA) 30 MG capsule TAKE 1 CAPSULE BY MOUTH EVERY DAY 30 capsule 5   • fluticasone (FLONASE) 50 MCG/ACT nasal spray 2 sprays into each nostril Daily. (Patient taking differently: 2 sprays into the nostril(s) as directed by provider Daily As Needed.)     • Fremanezumab-vfrm 225 MG/1.5ML solution prefilled syringe Inject 1.5 mL under the skin into the appropriate area as directed Every 30 (Thirty) Days. 1.5 mL 2   • hydrocortisone (ANUSOL-HC) 25 MG suppository 1 per rectum at bedtime as needed for hemorrhoidal bleeding 10 suppository 5   • ondansetron (ZOFRAN) 8 MG tablet Take 8 mg by mouth Every 8 (Eight) Hours As Needed.     • pantoprazole (PROTONIX) 40 MG EC tablet TAKE 1 TABLET BY MOUTH EVERY DAY 30 tablet 5   • rosuvastatin (CRESTOR) 10 MG tablet TAKE 1 TABLET BY MOUTH EVERY DAY 90 tablet 3   • ZOLMitriptan (ZOMIG-ZMT) 5 MG disintegrating tablet TAKE 1 TABLET BY MOUTH 2 (TWO) TIMES A DAY AS NEEDED FOR MIGRAINE. 9 tablet 5     No current facility-administered medications for this visit.           ____________________________________________________________________      REVIEW OF SYSTEMS    Review of Systems  ROS ROS Negative for fever or chills, sore throat, congestion, loss of taste/smell, cough, chest pain, shortness of breath, nausea/vomiting/diarrhea, headaches or myalgia      PHYSICAL EXAMINATION  There were no vitals taken for this visit.   No acute distress.  Alert with normal thought and judgment.       REVIEWED DATA:    Labs:         Imaging:         Medical Tests:         Summary of old records / correspondence / consultant report:          Request outside records:         ALLERGIES  No Known Allergies     PFSH:     The following portions of the patient's history were reviewed and updated as appropriate: Allergies / Current Medications / Past Medical History / Surgical History / Social History / Family History    PROBLEM LIST   Patient Active Problem List   Diagnosis   • Colon polyp    • Internal hemorrhoids   • Migraines   • Nicotine dependence   • Anxiety disorder   • Hyperlipidemia   • Irritable bowel syndrome with both constipation and diarrhea   • GERD with esophagitis   • Restless legs syndrome (RLS)   • Other chronic pain   • Degenerative lumbar disc   • Synovial cyst of lumbar facet joint   • Chronic back pain   • Lumbar facet arthropathy       PAST MEDICAL HISTORY  Past Medical History:   Diagnosis Date   • Anxiety    • Arthritis    • Colon polyps    • Colon polyps    • Elevated cholesterol    • GERD (gastroesophageal reflux disease)    • Hyperlipidemia    • Migraines        SURGICAL HISTORY  Past Surgical History:   Procedure Laterality Date   • CHOLECYSTECTOMY WITH INTRAOPERATIVE CHOLANGIOGRAM N/A 2019    Procedure: CHOLECYSTECTOMY LAPAROSCOPIC INTRAOPERATIVE CHOLANGIOGRAM;  Surgeon: Reji Hoffman MD;  Location: [facility] OR;  Service: General   • COLONOSCOPY N/A 2015    Two 4-6 mm polyps at the recto-sigmoid and in the mid ascending colon (PATH: tubulovillous adenoma w/ low-grade dysplasia & hyperplastic colon polyp at 20 cm), internal hemorrhoids; Dr. Chato Allen   • REFRACTIVE SURGERY Bilateral    • SHOULDER SURGERY Bilateral ,    Left Shoulder  Dr. Mehta, Right Shoulder  Dr. Mehta       SOCIAL HISTORY  Social History     Socioeconomic History   • Marital status:      Spouse name: Doris   • Number of children: 3   • Years of education: Not on file   • Highest education level: Not on file   Occupational History   • Occupation: Software consulting   Tobacco Use   • Smoking status: Former Smoker     Packs/day: 0.75     Years: 0.00     Pack years: 0.00     Start date: 3/8/2019     Last attempt to quit: 2019     Years since quittin.8   • Smokeless tobacco: Former User     Types: Chew     Quit date: 3/8/2019   • Tobacco comment: when he drinks   Substance and Sexual Activity   • Alcohol use: Yes     Alcohol/week: 3.0  standard drinks     Types: 1 Glasses of wine, 1 Cans of beer, 1 Shots of liquor per week     Frequency: 2-3 times a week     Drinks per session: 1 or 2     Comment: drinks occasionally 1-2 drinks per week   • Drug use: Yes     Types: Marijuana     Comment: used marijuana in the past in college   • Sexual activity: Yes     Partners: Female           **Sabrina Disclaimer:   Much of this encounter note is an electronic transcription/translation of spoken language to printed text. The electronic translation of spoken language may permit erroneous, or at times, nonsensical words or phrases to be inadvertently transcribed. Although I have reviewed the note for such errors, some may still exist.     Template created by Kristal Jackson MD

## 2020-07-10 NOTE — PATIENT INSTRUCTIONS
** IMPORTANT MESSAGE FROM DR. THACKER **    In our office, your satisfaction is VERY important to us.     You may receive a survey from [facility] by mail or E-mail for you to provide feedback about your visit. This information is invaluable for me to know what we can do to improve our services.     I ask that you please take a few minutes to complete the survey and let us know how we are doing in serving your needs. (You may receive the survey more than once for multiple visits)    Thank You !    Dr. Thacker & Staff    _________________________________________________________________________________________________________________________      ** ADDITIONAL INSTRUCTION / REMINDERS FROM DR. THACKER **    How to Quarantine at Home  Information for Patients and Families    These instructions are for people with confirmed or suspected COVID-19 who do not need to be hospitalized and those with confirmed COVID-19 who were hospitalized and discharged to care for themselves at home.    If you were tested through the Health Department  The Health Department will monitor your wellbeing.  If it is determined that you do not need to be hospitalized and can be isolated at home, you will be monitored by staff from your local or state health department.     If you were tested through a [facility]  You will need to monitor yourself and report changes in your symptoms to your doctor.  See the section below called Monitor Your Symptoms.    Follow these steps until a healthcare provider or local or state health department says you can return to your normal activities.    Stay home except to get medical care  • Restrict activities outside your home, except for getting medical care.   • Do not go to work, school, or public areas.   • Avoid using public transportation, ride-sharing, or taxis.    Separate yourself from other people and animals in your home  People  As much as possible, you should stay in a specific room and away from other  people in your home. Also, you should use a separate bathroom, if available.    Animals  You should restrict contact with pets and other animals while you are sick with COVID-19, just like you would around other people. When possible, have another member of your household care for your animals while you are sick. If you are sick with COVID-19, avoid contact with your pet, including petting, snuggling, being kissed or licked, and sharing food. If you must care for your pet or be around animals while you are sick, wash your hands before and after you interact with pets and wear a facemask. See COVID-19 and Animals for more information.    Call ahead before visiting your doctor  If you have a medical appointment, call the healthcare provider and tell them that you have or may have COVID-19. This information will help the healthcare provider’s office take steps to keep other people from getting infected or exposed.    Wear a facemask  You should wear a facemask when you are around other people (e.g., sharing a room or vehicle) or pets and before you enter a healthcare provider’s office.     If you are not able to wear a facemask (for example, because it causes trouble breathing), then people who live with you should not stay in the same room with you, or they should wear a facemask if they enter your room.    Cover your coughs and sneezes  • Cover your mouth and nose with a tissue when you cough or sneeze.   • Throw used tissues in a lined trash can.   • Immediately wash your hands with soap and water for at least 20 seconds or, if soap and water are not available, clean your hands with an alcohol-based hand  that contains at least 60% alcohol.    Clean your hands often  • Wash your hands often with soap and water for at least 20 seconds, especially after blowing your nose, coughing, or sneezing; going to the bathroom; and before eating or preparing food.     • If soap and water are not readily available, use  an alcohol-based hand  with at least 60% alcohol, covering all surfaces of your hands and rubbing them together until they feel dry.    • Soap and water are the best option if hands are visibly dirty. Avoid touching your eyes, nose, and mouth with unwashed hands.    Avoid sharing personal household items  • You should not share dishes, drinking glasses, cups, eating utensils, towels, or bedding with other people or pets in your home.   • After using these items, they should be washed thoroughly with soap and water.    Clean all “high-touch” surfaces everyday  • High touch surfaces include counters, tabletops, doorknobs, bathroom fixtures, toilets, phones, keyboards, tablets, and bedside tables.   • Also, clean any surfaces that may have blood, stool, or body fluids on them.   • Use a household cleaning spray or wipe, according to the label instructions. Labels contain instructions for safe and effective use of the cleaning product, including precautions you should take when applying the product, such as wearing gloves and making sure you have good ventilation during use of the product.    Monitor your symptoms  • Seek prompt medical attention if your illness is worsening (e.g., difficulty breathing).   • Before seeking care, call your healthcare provider and tell them that you have, or are being evaluated for, COVID-19.   • Put on a facemask before you enter the facility.     • These steps will help the healthcare provider’s office to keep other people in the office or waiting room from getting infected or exposed.   • Persons who are placed under active monitoring or facilitated self-monitoring should follow instructions provided by their local health department or occupational health professionals, as appropriate.  • If you have a medical emergency and need to call 911, notify the dispatch personnel that you have, or are being evaluated for COVID-19. If possible, put on a facemask before emergency medical  services arrive.    Discontinuing home isolation  Patients with confirmed COVID-19 should remain under home isolation precautions until the risk of secondary transmission to others is thought to be low. The decision to discontinue home isolation precautions should be made on a case-by-case basis, in consultation with healthcare providers and state and local health departments.    The below content are for household members, intimate partners, and caregivers of a patient with symptomatic laboratory-confirmed COVID-19 or a patient under investigation:    Household members, intimate partners, and caregivers may have close contact with a person with symptomatic, laboratory-confirmed COVID-19 or a person under investigation.     Close contacts should monitor their health; they should call their healthcare provider right away if they develop symptoms suggestive of COVID-19 (e.g., fever, cough, shortness of breath)     Close contacts should also follow these recommendations:  • Make sure that you understand and can help the patient follow their healthcare provider’s instructions for medication(s) and care. You should help the patient with basic needs in the home and provide support for getting groceries, prescriptions, and other personal needs.  • Monitor the patient’s symptoms. If the patient is getting sicker, call his or her healthcare provider and tell them that the patient has laboratory-confirmed COVID-19. This will help the healthcare provider’s office take steps to keep other people in the office or waiting room from getting infected. Ask the healthcare provider to call the local or state health department for additional guidance. If the patient has a medical emergency and you need to call 911, notify the dispatch personnel that the patient has, or is being evaluated for COVID-19.  • Household members should stay in another room or be  from the patient as much as possible. Household members should use a  separate bedroom and bathroom, if available.  • Prohibit visitors who do not have an essential need to be in the home.  • Household members should care for any pets in the home. Do not handle pets or other animals while sick.  For more information, see COVID-19 and Animals.  • Make sure that shared spaces in the home have good air flow, such as by an air conditioner or an opened window, weather permitting.  • Perform hand hygiene frequently. Wash your hands often with soap and water for at least 20 seconds or use an alcohol-based hand  that contains 60 to 95% alcohol, covering all surfaces of your hands and rubbing them together until they feel dry. Soap and water should be used preferentially if hands are visibly dirty.  • Avoid touching your eyes, nose, and mouth with unwashed hands.  • The patient should wear a facemask when you are around other people. If the patient is not able to wear a facemask (for example, because it causes trouble breathing), you, as the caregiver, should wear a mask when you are in the same room as the patient.  • Wear a disposable facemask and gloves when you touch or have contact with the patient’s blood, stool, or body fluids, such as saliva, sputum, nasal mucus, vomit, or urine.   o Throw out disposable facemasks and gloves after using them. Do not reuse.  o When removing personal protective equipment, first remove and dispose of gloves. Then, immediately clean your hands with soap and water or alcohol-based hand . Next, remove and dispose of facemask, and immediately clean your hands again with soap and water or alcohol-based hand .  • Avoid sharing household items with the patient. You should not share dishes, drinking glasses, cups, eating utensils, towels, bedding, or other items. After the patient uses these items, you should wash them thoroughly (see below “Wash laundry thoroughly”).  • Clean all “high-touch” surfaces, such as counters, tabletops,  doorknobs, bathroom fixtures, toilets, phones, keyboards, tablets, and bedside tables, every day. Also, clean any surfaces that may have blood, stool, or body fluids on them.   o Use a household cleaning spray or wipe, according to the label instructions. Labels contain instructions for safe and effective use of the cleaning product including precautions you should take when applying the product, such as wearing gloves and making sure you have good ventilation during use of the product.  • Wash laundry thoroughly.   o Immediately remove and wash clothes or bedding that have blood, stool, or body fluids on them.  o Wear disposable gloves while handling soiled items and keep soiled items away from your body. Clean your hands (with soap and water or an alcohol-based hand ) immediately after removing your gloves.  o Read and follow directions on labels of laundry or clothing items and detergent. In general, using a normal laundry detergent according to washing machine instructions and dry thoroughly using the warmest temperatures recommended on the clothing label.  • Place all used disposable gloves, facemasks, and other contaminated items in a lined container before disposing of them with other household waste. Clean your hands (with soap and water or an alcohol-based hand ) immediately after handling these items. Soap and water should be used preferentially if hands are visibly dirty.  • Discuss any additional questions with your state or local health department or healthcare provider.    Adapted from information provided by the Centers for Disease Control and Prevention.  For more information, visit https://www.cdc.gov/coronavirus/2019-ncov/hcp/guidance-prevent-spread.html    COVID-19  COVID-19 is a respiratory infection that is caused by a virus called severe acute respiratory syndrome coronavirus 2 (SARS-CoV-2). The disease is also known as coronavirus disease or novel coronavirus. In some people,  the virus may not cause any symptoms. In others, it may cause a serious infection. The infection can get worse quickly and can lead to complications, such as:  · Pneumonia, or infection of the lungs.  · Acute respiratory distress syndrome or ARDS. This is fluid build-up in the lungs.  · Acute respiratory failure. This is a condition in which there is not enough oxygen passing from the lungs to the body.  · Sepsis or septic shock. This is a serious bodily reaction to an infection.  · Blood clotting problems.  · Secondary infections due to bacteria or fungus.  The virus that causes COVID-19 is contagious. This means that it can spread from person to person through droplets from coughs and sneezes (respiratory secretions).  What are the causes?  This illness is caused by a virus. You may catch the virus by:  · Breathing in droplets from an infected person's cough or sneeze.  · Touching something, like a table or a doorknob, that was exposed to the virus (contaminated) and then touching your mouth, nose, or eyes.  What increases the risk?  Risk for infection  You are more likely to be infected with this virus if you:  · Live in or travel to an area with a COVID-19 outbreak.  · Come in contact with a sick person who recently traveled to an area with a COVID-19 outbreak.  · Provide care for or live with a person who is infected with COVID-19.  Risk for serious illness  You are more likely to become seriously ill from the virus if you:  · Are 65 years of age or older.  · Have a long-term disease that lowers your body's ability to fight infection (immunocompromised).  · Live in a nursing home or long-term care facility.  · Have a long-term (chronic) disease such as:  ? Chronic lung disease, including chronic obstructive pulmonary disease or asthma  ? Heart disease.  ? Diabetes.  ? Chronic kidney disease.  ? Liver disease.  · Are obese.  What are the signs or symptoms?  Symptoms of this condition can range from mild to  severe. Symptoms may appear any time from 2 to 14 days after being exposed to the virus. They include:  · A fever.  · A cough.  · Difficulty breathing.  · Chills.  · Muscle pains.  · A sore throat.  · Loss of taste or smell.  Some people may also have stomach problems, such as nausea, vomiting, or diarrhea.  Other people may not have any symptoms of COVID-19.  How is this diagnosed?  This condition may be diagnosed based on:  · Your signs and symptoms, especially if:  ? You live in an area with a COVID-19 outbreak.  ? You recently traveled to or from an area where the virus is common.  ? You provide care for or live with a person who was diagnosed with COVID-19.  · A physical exam.  · Lab tests, which may include:  ? A nasal swab to take a sample of fluid from your nose.  ? A throat swab to take a sample of fluid from your throat.  ? A sample of mucus from your lungs (sputum).  ? Blood tests.  · Imaging tests, which may include, X-rays, CT scan, or ultrasound.  How is this treated?  At present, there is no medicine to treat COVID-19. Medicines that treat other diseases are being used on a trial basis to see if they are effective against COVID-19.  Your health care provider will talk with you about ways to treat your symptoms. For most people, the infection is mild and can be managed at home with rest, fluids, and over-the-counter medicines.  Treatment for a serious infection usually takes places in a hospital intensive care unit (ICU). It may include one or more of the following treatments. These treatments are given until your symptoms improve.  · Receiving fluids and medicines through an IV.  · Supplemental oxygen. Extra oxygen is given through a tube in the nose, a face mask, or a quiñonez.  · Positioning you to lie on your stomach (prone position). This makes it easier for oxygen to get into the lungs.  · Continuous positive airway pressure (CPAP) or bi-level positive airway pressure (BPAP) machine. This treatment  uses mild air pressure to keep the airways open. A tube that is connected to a motor delivers oxygen to the body.  · Ventilator. This treatment moves air into and out of the lungs by using a tube that is placed in your windpipe.  · Tracheostomy. This is a procedure to create a hole in the neck so that a breathing tube can be inserted.  · Extracorporeal membrane oxygenation (ECMO). This procedure gives the lungs a chance to recover by taking over the functions of the heart and lungs. It supplies oxygen to the body and removes carbon dioxide.  Follow these instructions at home:  Lifestyle  · If you are sick, stay home except to get medical care. Your health care provider will tell you how long to stay home. Call your health care provider before you go for medical care.  · Rest at home as told by your health care provider.  · Do not use any products that contain nicotine or tobacco, such as cigarettes, e-cigarettes, and chewing tobacco. If you need help quitting, ask your health care provider.  · Return to your normal activities as told by your health care provider. Ask your health care provider what activities are safe for you.  General instructions  · Take over-the-counter and prescription medicines only as told by your health care provider.  · Drink enough fluid to keep your urine pale yellow.  · Keep all follow-up visits as told by your health care provider. This is important.  How is this prevented?    There is no vaccine to help prevent COVID-19 infection. However, there are steps you can take to protect yourself and others from this virus.  To protect yourself:   · Do not travel to areas where COVID-19 is a risk. The areas where COVID-19 is reported change often. To identify high-risk areas and travel restrictions, check the CDC travel website: wwwnc.cdc.gov/travel/notices  · If you live in, or must travel to, an area where COVID-19 is a risk, take precautions to avoid infection.  ? Stay away from people who are  sick.  ? Wash your hands often with soap and water for 20 seconds. If soap and water are not available, use an alcohol-based hand .  ? Avoid touching your mouth, face, eyes, or nose.  ? Avoid going out in public, follow guidance from your state and local health authorities.  ? If you must go out in public, wear a cloth face covering or face mask.  ? Disinfect objects and surfaces that are frequently touched every day. This may include:  § Counters and tables.  § Doorknobs and light switches.  § Sinks and faucets.  § Electronics, such as phones, remote controls, keyboards, computers, and tablets.  To protect others:  If you have symptoms of COVID-19, take steps to prevent the virus from spreading to others.  · If you think you have a COVID-19 infection, contact your health care provider right away. Tell your health care team that you think you may have a COVID-19 infection.  · Stay home. Leave your house only to seek medical care. Do not use public transport.  · Do not travel while you are sick.  · Wash your hands often with soap and water for 20 seconds. If soap and water are not available, use alcohol-based hand .  · Stay away from other members of your household. Let healthy household members care for children and pets, if possible. If you have to care for children or pets, wash your hands often and wear a mask. If possible, stay in your own room, separate from others. Use a different bathroom.  · Make sure that all people in your household wash their hands well and often.  · Cough or sneeze into a tissue or your sleeve or elbow. Do not cough or sneeze into your hand or into the air.  · Wear a cloth face covering or face mask.  Where to find more information  · Centers for Disease Control and Prevention: www.cdc.gov/coronavirus/2019-ncov/index.html  · World Health Organization: www.who.int/health-topics/coronavirus  Contact a health care provider if:  · You live in or have traveled to an area  where COVID-19 is a risk and you have symptoms of the infection.  · You have had contact with someone who has COVID-19 and you have symptoms of the infection.  Get help right away if:  · You have trouble breathing.  · You have pain or pressure in your chest.  · You have confusion.  · You have bluish lips and fingernails.  · You have difficulty waking from sleep.  · You have symptoms that get worse.  These symptoms may represent a serious problem that is an emergency. Do not wait to see if the symptoms will go away. Get medical help right away. Call your local emergency services (911 in the [place] Do not drive yourself to the hospital. Let the emergency medical personnel know if you think you have COVID-19.  Summary  · COVID-19 is a respiratory infection that is caused by a virus. It is also known as coronavirus disease or novel coronavirus. It can cause serious infections, such as pneumonia, acute respiratory distress syndrome, acute respiratory failure, or sepsis.  · The virus that causes COVID-19 is contagious. This means that it can spread from person to person through droplets from coughs and sneezes.  · You are more likely to develop a serious illness if you are 65 years of age or older, have a weak immunity, live in a nursing home, or have chronic disease.  · There is no medicine to treat COVID-19. Your health care provider will talk with you about ways to treat your symptoms.  · Take steps to protect yourself and others from infection. Wash your hands often and disinfect objects and surfaces that are frequently touched every day. Stay away from people who are sick and wear a mask if you are sick.  This information is not intended to replace advice given to you by your health care provider. Make sure you discuss any questions you have with your health care provider.  Document Released: 01/23/2020 Document Revised: 05/14/2020 Document Reviewed: 01/23/2020  [employer] Patient Education © 2020 [employer]

## 2020-07-29 ENCOUNTER — OFFICE VISIT (OUTPATIENT)
Dept: PAIN MEDICINE | Facility: CLINIC | Age: 45
End: 2020-07-29

## 2020-07-29 VITALS
RESPIRATION RATE: 20 BRPM | HEIGHT: 69 IN | DIASTOLIC BLOOD PRESSURE: 77 MMHG | SYSTOLIC BLOOD PRESSURE: 115 MMHG | WEIGHT: 193.6 LBS | OXYGEN SATURATION: 98 % | BODY MASS INDEX: 28.68 KG/M2 | TEMPERATURE: 99.3 F | HEART RATE: 92 BPM

## 2020-07-29 DIAGNOSIS — M47.816 LUMBAR FACET ARTHROPATHY: Primary | Chronic | ICD-10-CM

## 2020-07-29 DIAGNOSIS — G89.29 OTHER CHRONIC PAIN: ICD-10-CM

## 2020-07-29 DIAGNOSIS — M62.838 MUSCLE SPASM: ICD-10-CM

## 2020-07-29 PROCEDURE — 99213 OFFICE O/P EST LOW 20 MIN: CPT | Performed by: NURSE PRACTITIONER

## 2020-07-29 RX ORDER — CYCLOBENZAPRINE HCL 10 MG
10 TABLET ORAL 3 TIMES DAILY
Qty: 90 TABLET | Refills: 0 | COMMUNITY
End: 2020-07-29 | Stop reason: SDUPTHER

## 2020-07-29 RX ORDER — CYCLOBENZAPRINE HCL 10 MG
10 TABLET ORAL 3 TIMES DAILY
Qty: 90 TABLET | Refills: 0 | Status: SHIPPED | OUTPATIENT
Start: 2020-07-29 | End: 2020-08-26

## 2020-08-26 RX ORDER — CYCLOBENZAPRINE HCL 10 MG
TABLET ORAL
Qty: 90 TABLET | Refills: 3 | Status: SHIPPED | OUTPATIENT
Start: 2020-08-26 | End: 2021-09-03

## 2020-09-08 DIAGNOSIS — K64.8 INTERNAL HEMORRHOIDS: Primary | ICD-10-CM

## 2020-09-14 RX ORDER — HYDROCORTISONE ACETATE 25 MG/1
SUPPOSITORY RECTAL
Qty: 10 SUPPOSITORY | Refills: 3 | OUTPATIENT
Start: 2020-09-14

## 2020-09-14 RX ORDER — HYDROCORTISONE 25 MG/G
CREAM TOPICAL
Qty: 28.35 G | Refills: 6 | OUTPATIENT
Start: 2020-09-14

## 2020-10-13 RX ORDER — FREMANEZUMAB-VFRM 225 MG/1.5ML
INJECTION SUBCUTANEOUS
Qty: 1.5 SYRINGE | Refills: 2 | Status: SHIPPED | OUTPATIENT
Start: 2020-10-13 | End: 2021-01-19

## 2020-11-06 ENCOUNTER — OFFICE VISIT (OUTPATIENT)
Dept: SURGERY | Facility: CLINIC | Age: 45
End: 2020-11-06

## 2020-11-06 VITALS
SYSTOLIC BLOOD PRESSURE: 130 MMHG | BODY MASS INDEX: 28.81 KG/M2 | TEMPERATURE: 97.8 F | HEIGHT: 69 IN | HEART RATE: 82 BPM | DIASTOLIC BLOOD PRESSURE: 92 MMHG | OXYGEN SATURATION: 99 % | WEIGHT: 194.5 LBS

## 2020-11-06 DIAGNOSIS — K64.8 INTERNAL HEMORRHOIDS: Primary | ICD-10-CM

## 2020-11-06 DIAGNOSIS — Z86.010 HISTORY OF COLON POLYPS: ICD-10-CM

## 2020-11-06 DIAGNOSIS — K58.2 IRRITABLE BOWEL SYNDROME WITH BOTH CONSTIPATION AND DIARRHEA: ICD-10-CM

## 2020-11-06 PROCEDURE — 46600 DIAGNOSTIC ANOSCOPY SPX: CPT | Performed by: COLON & RECTAL SURGERY

## 2020-11-06 PROCEDURE — 99244 OFF/OP CNSLTJ NEW/EST MOD 40: CPT | Performed by: COLON & RECTAL SURGERY

## 2020-11-06 RX ORDER — HYDROCORTISONE 25 MG/G
CREAM TOPICAL
Qty: 30 G | Refills: 1 | Status: SHIPPED | OUTPATIENT
Start: 2020-11-06 | End: 2021-05-04 | Stop reason: SDUPTHER

## 2020-11-06 NOTE — PROGRESS NOTES
Sadiq Alexander is a 45 y.o. male who is seen as a consult at the request of Charly Jackson MD for Hemorrhoids, Rectal Bleeding, Rectal Pain, Itching, Constipation, and Diarrhea.      HPI:  Rb, itching, burning every BM; symptoms x 2 yrs, now worse. Has had IBS symptoms with both constipation and diarrhea and cramping. No fiber-diet only; no ss. Occasionally uses hemorrhoid cream. Saw Dr. Allen in the past. History of colon polyps. Anal leakage of stool.     Past Medical History:   Diagnosis Date   • Anxiety    • Arthritis    • Colon polyps    • Colon polyps    • Elevated cholesterol    • GERD (gastroesophageal reflux disease)    • Hyperlipidemia    • Migraines        Past Surgical History:   Procedure Laterality Date   • CHOLECYSTECTOMY WITH INTRAOPERATIVE CHOLANGIOGRAM N/A 2/5/2019    Procedure: CHOLECYSTECTOMY LAPAROSCOPIC INTRAOPERATIVE CHOLANGIOGRAM;  Surgeon: Reji Hoffman MD;  Location: [facility];  Service: General   • COLONOSCOPY N/A 03/11/2015    Two 4-6 mm polyps at the recto-sigmoid and in the mid ascending colon (PATH: tubulovillous adenoma w/ low-grade dysplasia & hyperplastic colon polyp at 20 cm), internal hemorrhoids; Dr. Chato Allen   • REFRACTIVE SURGERY Bilateral 2018   • SHOULDER SURGERY Bilateral 2005,2015    Left Shoulder 2005 Dr. Mehta, Right Shoulder 2015 Dr. Mehta       Social History:   reports that he quit smoking about 14 months ago. He started smoking about 20 months ago. He smoked 0.75 packs per day for 0.00 years. He quit smokeless tobacco use about 20 months ago.  His smokeless tobacco use included chew. He reports current alcohol use of about 3.0 standard drinks of alcohol per week. He reports current drug use. Drug: Marijuana.      Marriage status:     Family History   Problem Relation Age of Onset   • Melanoma Father    • Depression Father    • No Known Problems Sister    • Parkinsonism Maternal Grandmother    • Lung cancer Maternal Grandfather          smoker   • [place]'s disease Maternal Grandfather    • Lung cancer Paternal Grandmother         smoker   • Lung cancer Paternal Aunt         smoker   • Cancer Maternal Aunt         spine   • Heart attack Paternal Uncle 55   • Colon cancer Neg Hx    • Prostate cancer Neg Hx          Current Outpatient Medications:   •  Ajovy 225 MG/1.5ML solution prefilled syringe, INJECT 1.5 ML UNDER THE SKIN INTO THE APPROPRIATE AREA AS DIRECTED EVERY 30 (THIRTY) DAYS., Disp: 1.5 syringe, Rfl: 2  •  cyclobenzaprine (FLEXERIL) 10 MG tablet, TAKE 1 TABLET BY MOUTH THREE TIMES A DAY, Disp: 90 tablet, Rfl: 3  •  diclofenac (VOLTAREN) 50 MG EC tablet, TAKE 1 TABLET BY MOUTH 2 (TWO) TIMES A DAY AS NEEDED (PAIN). TAKE WITH FOOD, Disp: 60 tablet, Rfl: 3  •  pantoprazole (PROTONIX) 40 MG EC tablet, TAKE 1 TABLET BY MOUTH EVERY DAY, Disp: 30 tablet, Rfl: 5  •  rosuvastatin (CRESTOR) 10 MG tablet, TAKE 1 TABLET BY MOUTH EVERY DAY, Disp: 90 tablet, Rfl: 3  •  ZOLMitriptan (ZOMIG-ZMT) 5 MG disintegrating tablet, TAKE 1 TABLET BY MOUTH 2 (TWO) TIMES A DAY AS NEEDED FOR MIGRAINE., Disp: 9 tablet, Rfl: 5  •  DULoxetine (CYMBALTA) 30 MG capsule, TAKE 1 CAPSULE BY MOUTH EVERY DAY, Disp: 30 capsule, Rfl: 5  •  fluticasone (FLONASE) 50 MCG/ACT nasal spray, 2 sprays into each nostril Daily. (Patient taking differently: 2 sprays into the nostril(s) as directed by provider Daily As Needed.), Disp: , Rfl:   •  hydrocortisone (ANUSOL-HC) 25 MG suppository, 1 per rectum at bedtime as needed for hemorrhoidal bleeding, Disp: 10 suppository, Rfl: 5  •  Hydrocortisone, Perianal, (Anusol-HC) 2.5 % rectal cream, Apply rectally 3 times daily.  Include applicator., Disp: 30 g, Rfl: 1  •  ondansetron (ZOFRAN) 8 MG tablet, Take 8 mg by mouth Every 8 (Eight) Hours As Needed., Disp: , Rfl:     Allergy  Patient has no known allergies.    Review of Systems   Constitution: Negative for decreased appetite and weight gain.   HENT: Negative for congestion, hearing loss and  hoarse voice.    Eyes: Negative for blurred vision, discharge and visual disturbance.   Cardiovascular: Negative for chest pain, cyanosis and leg swelling.   Respiratory: Negative for cough, shortness of breath, sleep disturbances due to breathing and snoring.    Endocrine: Negative for cold intolerance and heat intolerance.   Hematologic/Lymphatic: Does not bruise/bleed easily.   Skin: Negative for itching, poor wound healing and skin cancer.   Musculoskeletal: Negative for arthritis, back pain, joint pain and joint swelling.   Gastrointestinal: Positive for bowel incontinence and constipation. Negative for abdominal pain and change in bowel habit.   Genitourinary: Negative for bladder incontinence, dysuria and hematuria.   Neurological: Positive for headaches. Negative for brief paralysis, excessive daytime sleepiness, dizziness, focal weakness, light-headedness and weakness.   Psychiatric/Behavioral: Negative for altered mental status and hallucinations. The patient does not have insomnia.    Allergic/Immunologic: Negative for HIV exposure and persistent infections.   All other systems reviewed and are negative.      Vitals:    11/06/20 1352   BP: 130/92   Pulse: 82   Temp: 97.8 °F (36.6 °C)   SpO2: 99%     Body mass index is 28.72 kg/m².    Physical Exam  Exam conducted with a chaperone present.   Constitutional:       General: He is not in acute distress.     Appearance: He is well-developed.   HENT:      Head: Normocephalic and atraumatic.      Nose: Nose normal.   Eyes:      Conjunctiva/sclera: Conjunctivae normal.      Pupils: Pupils are equal, round, and reactive to light.   Neck:      Musculoskeletal: Normal range of motion.      Trachea: No tracheal deviation.   Pulmonary:      Effort: Pulmonary effort is normal. No respiratory distress.      Breath sounds: Normal breath sounds.   Abdominal:      General: Bowel sounds are normal. There is no distension.      Palpations: Abdomen is soft.   Genitourinary:      Comments: Perianal exam: external hem - RANT  AMOS- good tone, no masses  Anoscopy performed:  Grade 2 x 3 internal hem    Musculoskeletal: Normal range of motion.         General: No deformity.   Skin:     General: Skin is warm and dry.   Neurological:      Mental Status: He is alert and oriented to person, place, and time.      Cranial Nerves: No cranial nerve deficit.      Coordination: Coordination normal.      Gait: Gait normal.   Psychiatric:         Behavior: Behavior normal.         Judgment: Judgment normal.         Assessment:  1. Internal hemorrhoids    2. History of colon polyps    3. Irritable bowel syndrome with both constipation and diarrhea        Plan:  Fiber supplement. Anusol cream to use as directed. If not improved, at next visit, discussed RBL  RTC 6-8 wks.      Scribed for Rand Wheatley MD by Gabriel Mahan, CIERA. 11/8/2020  20:30 EST  This patient was evaluated by me, recommendations made, documentation reviewed, edited, and revised by me, Rand Wheatley MD

## 2020-11-27 NOTE — TELEPHONE ENCOUNTER
----- Message from Chato WISE MD sent at 1/15/2019  2:19 PM EST -----  Regarding: Biopsy results  Okay to call results, recommend continue PPI.  Await results of gallbladder ultrasound.  If negative would offer HIDA scan.  ----- Message -----  From: Interface, Scans Incoming  Sent: 1/15/2019  12:59 PM  To: Chato WISE MD      
See note of 1/14.  
Positive therapeutic relationships/Identifies reasons for living/Has future plans/Responsibility to family and others

## 2020-12-15 DIAGNOSIS — K21.00 GERD WITH ESOPHAGITIS: Chronic | ICD-10-CM

## 2020-12-15 RX ORDER — PANTOPRAZOLE SODIUM 40 MG/1
TABLET, DELAYED RELEASE ORAL
Qty: 30 TABLET | Refills: 11 | Status: SHIPPED | OUTPATIENT
Start: 2020-12-15 | End: 2022-02-16 | Stop reason: SDUPTHER

## 2020-12-18 ENCOUNTER — PROCEDURE VISIT (OUTPATIENT)
Dept: SURGERY | Facility: CLINIC | Age: 45
End: 2020-12-18

## 2020-12-18 VITALS
HEART RATE: 87 BPM | SYSTOLIC BLOOD PRESSURE: 122 MMHG | DIASTOLIC BLOOD PRESSURE: 76 MMHG | TEMPERATURE: 97.8 F | OXYGEN SATURATION: 98 % | HEIGHT: 69 IN | BODY MASS INDEX: 28.35 KG/M2 | WEIGHT: 191.4 LBS

## 2020-12-18 DIAGNOSIS — K64.8 INTERNAL HEMORRHOIDS WITH COMPLICATION: Primary | ICD-10-CM

## 2020-12-18 PROCEDURE — 99212 OFFICE O/P EST SF 10 MIN: CPT | Performed by: COLON & RECTAL SURGERY

## 2020-12-18 PROCEDURE — 46221 LIGATION OF HEMORRHOID(S): CPT | Performed by: COLON & RECTAL SURGERY

## 2020-12-18 NOTE — PROGRESS NOTES
"Sadiq Alexander is a 45 y.o. male in for follow up of Internal hemorrhoids with complication  Denies improvement in symptoms.  Still having rectal bleeding.  Swelling.  Use the cream.      /76 (BP Location: Left arm, Patient Position: Sitting, Cuff Size: Adult)   Pulse 87   Temp 97.8 °F (36.6 °C) (Temporal)   Ht 175.3 cm (69\")   Wt 86.8 kg (191 lb 6.4 oz)   SpO2 98%   BMI 28.26 kg/m²   Body mass index is 28.26 kg/m².      PE:  Physical Exam  Exam conducted with a chaperone present.   Constitutional:       General: He is not in acute distress.     Appearance: He is well-developed.   HENT:      Head: Normocephalic and atraumatic.   Abdominal:      General: There is no distension.      Palpations: Abdomen is soft.   Genitourinary:     Comments: Anoscopy: Grade 3 hemorrhoid LLAT; Grade 2 RPOST.  Musculoskeletal: Normal range of motion.   Neurological:      Mental Status: He is alert.   Psychiatric:         Thought Content: Thought content normal.           Assessment:   1. Internal hemorrhoids with complication         Plan:    Procedure: Rubber band ligation of internal hemorrhoid    FINDINGS: There were moderate enlarged hemorrhoids in the LLAT, RPOST  postion.    PROCEDURE: After discussion and acceptance of all risks, benefits and alternatives an anoscope was inserted into the anal canal.  The hemorrhoic pedicals were defined and a rubber band was applied to the LLAT and RPOST hemorrhoids approximately 1 cm above the dentate line.  The patient was discharged when comfortable with instructions as to outcome, complications and follow up.     Take tylenol 1GM Q 6 hrs and alternate with ibuprofen for pain control. RTC in 5-6 weeks.    Scribed for Rand Wheatley MD by Gabriel Mahan, CIERA. 12/18/2020  15:45 EST  This patient was evaluated by me, recommendations made, documentation reviewed, edited, and revised by me, Rand Wheatley MD          "

## 2020-12-21 DIAGNOSIS — Z79.1 LONG TERM (CURRENT) USE OF NON-STEROIDAL ANTI-INFLAMMATORIES (NSAID): ICD-10-CM

## 2020-12-21 DIAGNOSIS — Z79.899 MEDICATION MANAGEMENT: Primary | ICD-10-CM

## 2021-01-08 ENCOUNTER — OFFICE VISIT (OUTPATIENT)
Dept: INTERNAL MEDICINE | Age: 46
End: 2021-01-08

## 2021-01-08 ENCOUNTER — LAB (OUTPATIENT)
Dept: LAB | Facility: HOSPITAL | Age: 46
End: 2021-01-08

## 2021-01-08 ENCOUNTER — HOSPITAL ENCOUNTER (OUTPATIENT)
Dept: GENERAL RADIOLOGY | Facility: HOSPITAL | Age: 46
Discharge: HOME | End: 2021-01-08

## 2021-01-08 VITALS
BODY MASS INDEX: 27.7 KG/M2 | OXYGEN SATURATION: 98 % | WEIGHT: 187 LBS | HEART RATE: 91 BPM | TEMPERATURE: 97.3 F | SYSTOLIC BLOOD PRESSURE: 110 MMHG | DIASTOLIC BLOOD PRESSURE: 78 MMHG | HEIGHT: 69 IN

## 2021-01-08 DIAGNOSIS — M25.562 CHRONIC PAIN OF LEFT KNEE: Primary | ICD-10-CM

## 2021-01-08 DIAGNOSIS — M23.92 LIGAMENTOUS LAXITY OF LEFT KNEE: ICD-10-CM

## 2021-01-08 DIAGNOSIS — Z23 NEED FOR INFLUENZA VACCINATION: ICD-10-CM

## 2021-01-08 DIAGNOSIS — Z79.1 LONG TERM (CURRENT) USE OF NON-STEROIDAL ANTI-INFLAMMATORIES (NSAID): ICD-10-CM

## 2021-01-08 DIAGNOSIS — G89.29 CHRONIC PAIN OF LEFT KNEE: Primary | ICD-10-CM

## 2021-01-08 LAB
ALBUMIN SERPL-MCNC: 5.2 G/DL (ref 3.5–5.2)
ALBUMIN/GLOB SERPL: 2.2 G/DL
ALP SERPL-CCNC: 93 U/L (ref 39–117)
ALT SERPL W P-5'-P-CCNC: 38 U/L (ref 1–41)
ANION GAP SERPL CALCULATED.3IONS-SCNC: 11.6 MMOL/L (ref 5–15)
AST SERPL-CCNC: 24 U/L (ref 1–40)
BASOPHILS # BLD AUTO: 0.04 10*3/MM3 (ref 0–0.2)
BASOPHILS NFR BLD AUTO: 0.3 % (ref 0–1.5)
BILIRUB SERPL-MCNC: 0.3 MG/DL (ref 0–1.2)
BUN SERPL-MCNC: 15 MG/DL (ref 6–20)
BUN/CREAT SERPL: 12.3 (ref 7–25)
CALCIUM SPEC-SCNC: 9.7 MG/DL (ref 8.6–10.5)
CHLORIDE SERPL-SCNC: 103 MMOL/L (ref 98–107)
CO2 SERPL-SCNC: 24.4 MMOL/L (ref 22–29)
CREAT SERPL-MCNC: 1.22 MG/DL (ref 0.76–1.27)
DEPRECATED RDW RBC AUTO: 54.2 FL (ref 37–54)
EOSINOPHIL # BLD AUTO: 0.16 10*3/MM3 (ref 0–0.4)
EOSINOPHIL NFR BLD AUTO: 1.3 % (ref 0.3–6.2)
ERYTHROCYTE [DISTWIDTH] IN BLOOD BY AUTOMATED COUNT: 18.2 % (ref 12.3–15.4)
GFR SERPL CREATININE-BSD FRML MDRD: 65 ML/MIN/1.73
GLOBULIN UR ELPH-MCNC: 2.4 GM/DL
GLUCOSE SERPL-MCNC: 95 MG/DL (ref 65–99)
HCT VFR BLD AUTO: 41.5 % (ref 37.5–51)
HGB BLD-MCNC: 13.9 G/DL (ref 13–17.7)
IMM GRANULOCYTES # BLD AUTO: 0.04 10*3/MM3 (ref 0–0.05)
IMM GRANULOCYTES NFR BLD AUTO: 0.3 % (ref 0–0.5)
LYMPHOCYTES # BLD AUTO: 3.34 10*3/MM3 (ref 0.7–3.1)
LYMPHOCYTES NFR BLD AUTO: 26.4 % (ref 19.6–45.3)
MCH RBC QN AUTO: 28 PG (ref 26.6–33)
MCHC RBC AUTO-ENTMCNC: 33.5 G/DL (ref 31.5–35.7)
MCV RBC AUTO: 83.5 FL (ref 79–97)
MONOCYTES # BLD AUTO: 0.87 10*3/MM3 (ref 0.1–0.9)
MONOCYTES NFR BLD AUTO: 6.9 % (ref 5–12)
NEUTROPHILS NFR BLD AUTO: 64.8 % (ref 42.7–76)
NEUTROPHILS NFR BLD AUTO: 8.19 10*3/MM3 (ref 1.7–7)
NRBC BLD AUTO-RTO: 0 /100 WBC (ref 0–0.2)
PLATELET # BLD AUTO: 319 10*3/MM3 (ref 140–450)
PMV BLD AUTO: 10 FL (ref 6–12)
POTASSIUM SERPL-SCNC: 4.5 MMOL/L (ref 3.5–5.2)
PROT SERPL-MCNC: 7.6 G/DL (ref 6–8.5)
RBC # BLD AUTO: 4.97 10*6/MM3 (ref 4.14–5.8)
SODIUM SERPL-SCNC: 139 MMOL/L (ref 136–145)
WBC # BLD AUTO: 12.64 10*3/MM3 (ref 3.4–10.8)

## 2021-01-08 PROCEDURE — 85025 COMPLETE CBC W/AUTO DIFF WBC: CPT | Performed by: NURSE PRACTITIONER

## 2021-01-08 PROCEDURE — 90471 IMMUNIZATION ADMIN: CPT | Performed by: INTERNAL MEDICINE

## 2021-01-08 PROCEDURE — 90686 IIV4 VACC NO PRSV 0.5 ML IM: CPT | Performed by: INTERNAL MEDICINE

## 2021-01-08 PROCEDURE — 99214 OFFICE O/P EST MOD 30 MIN: CPT | Performed by: INTERNAL MEDICINE

## 2021-01-08 PROCEDURE — 73562 X-RAY EXAM OF KNEE 3: CPT

## 2021-01-08 PROCEDURE — 80053 COMPREHEN METABOLIC PANEL: CPT | Performed by: NURSE PRACTITIONER

## 2021-01-08 PROCEDURE — 36415 COLL VENOUS BLD VENIPUNCTURE: CPT | Performed by: INTERNAL MEDICINE

## 2021-01-08 RX ORDER — DIOSMIN COMPLEX NO.1 630 MG
1 TABLET ORAL DAILY
COMMUNITY
Start: 2020-11-06 | End: 2021-11-16

## 2021-01-08 NOTE — PROGRESS NOTES
Miracle:    Sadiq, here are the result(s) of your test(s):     Xray is negative for any specific abnormalities. Let me know if you would like for me to proceed with MRI order.     Please do not hesitate to contact me if you have questions.

## 2021-01-08 NOTE — PROGRESS NOTES
"    I N T E R N A L  M E D I C I N E  J U N O H  K I M,  M D      ENCOUNTER DATE:  01/08/2021    Sadiq Alexander / 45 y.o. / male      CHIEF COMPLAINT / REASON FOR OFFICE VISIT     Left knee pain / instability      ASSESSMENT & PLAN     1. Chronic pain of left knee    2. Ligamentous laxity of left knee    3. Need for influenza vaccination    4. Long term (current) use of non-steroidal anti-inflammatories (nsaid)      Orders Placed This Encounter   Procedures   • XR Knee 3 View Left   • Fluarix Quad >6 Months ([facility]) ([id number])   • CBC Auto Differential     No orders of the defined types were placed in this encounter.     Orders Placed This Encounter   Procedures   • XR Knee 3 View Left   • Fluarix Quad >6 Months ([facility]) ([id number])   • CBC Auto Differential     No orders of the defined types were placed in this encounter.      SUMMARY/DISCUSSION  • Xray of the left knee was negative.   • Will see if he wants to proceed with MRI.   • Continue diclofenac BID PRN.   • Check labs.       Next Appointment with me: Visit date not found    No follow-ups on file.      VITAL SIGNS     Visit Vitals  /78   Pulse 91   Temp 97.3 °F (36.3 °C) (Temporal)   Ht 175.3 cm (69\")   Wt 84.8 kg (187 lb)   SpO2 98%   BMI 27.62 kg/m²       BP Readings from Last 3 Encounters:   01/08/21 110/78   12/18/20 122/76   11/06/20 130/92     Wt Readings from Last 3 Encounters:   01/08/21 84.8 kg (187 lb)   12/18/20 86.8 kg (191 lb 6.4 oz)   11/06/20 88.2 kg (194 lb 8 oz)     Body mass index is 27.62 kg/m².        HISTORY OF PRESENT ILLNESS     > 2.5 months of left knee pain, initially injured with while working on a ladder (he thinks he may have twisted). Denies significant swelling but has ongoing medial/lateral knee pain and laxity (\"gives out\") at times. Takes diclofenac on daily basis.         REVIEW OF SYSTEMS     GI neg for melena or abdominal pain     PHYSICAL EXAMINATION     Physical Exam  No acute distress   Right knee normal  Left knee " appearance normal without swelling  Mild medial knee tenderness to palpation   Mild laxity of left knee on ROM   Crepitus on flexion bilaterally       REVIEWED DATA     Labs:     Lab Results   Component Value Date     01/08/2021    K 4.5 01/08/2021    CALCIUM 9.7 01/08/2021    AST 24 01/08/2021    ALT 38 01/08/2021    BUN 15 01/08/2021    CREATININE 1.22 01/08/2021    CREATININE 1.21 08/12/2019    CREATININE 1.39 (H) 02/05/2019          Imaging:     Xr Knee 3 View Left    Result Date: 1/8/2021  Narrative: THREE-VIEW LEFT KNEE  HISTORY: Chronic knee pain.  FINDINGS: There is perhaps minimal joint space narrowing involving the medial compartment. The knee is otherwise unremarkable. There is no joint effusion.  This report was finalized on 1/8/2021 4:50 PM by Dr. Mario Montano M.D.           Medical Tests:           Summary of old records / correspondence / consultant report:           Request outside records:           MEDICATIONS AT THE TIME OF OFFICE VISIT     Current Outpatient Medications   Medication Sig Dispense Refill   • Ajovy 225 MG/1.5ML solution prefilled syringe INJECT 1.5 ML UNDER THE SKIN INTO THE APPROPRIATE AREA AS DIRECTED EVERY 30 (THIRTY) DAYS. 1.5 syringe 2   • cyclobenzaprine (FLEXERIL) 10 MG tablet TAKE 1 TABLET BY MOUTH THREE TIMES A DAY 90 tablet 3   • diclofenac (VOLTAREN) 50 MG EC tablet TAKE 1 TABLET BY MOUTH 2 (TWO) TIMES A DAY AS NEEDED (PAIN). TAKE WITH FOOD 60 tablet 3   • fluticasone (FLONASE) 50 MCG/ACT nasal spray 2 sprays into each nostril Daily. (Patient taking differently: 2 sprays into the nostril(s) as directed by provider Daily As Needed.)     • hydrocortisone (ANUSOL-HC) 25 MG suppository 1 per rectum at bedtime as needed for hemorrhoidal bleeding 10 suppository 5   • Hydrocortisone, Perianal, (Anusol-HC) 2.5 % rectal cream Apply rectally 3 times daily.  Include applicator. 30 g 1   • pantoprazole (PROTONIX) 40 MG EC tablet TAKE 1 TABLET BY MOUTH EVERY DAY 30 tablet 11    • rosuvastatin (CRESTOR) 10 MG tablet TAKE 1 TABLET BY MOUTH EVERY DAY 90 tablet 3   • ZOLMitriptan (ZOMIG-ZMT) 5 MG disintegrating tablet TAKE 1 TABLET BY MOUTH 2 (TWO) TIMES A DAY AS NEEDED FOR MIGRAINE. 9 tablet 5   • Dietary Management Product (Vasculera) tablet Take 1 tablet by mouth Daily.     • DULoxetine (CYMBALTA) 30 MG capsule TAKE 1 CAPSULE BY MOUTH EVERY DAY 30 capsule 5   • ondansetron (ZOFRAN) 8 MG tablet Take 8 mg by mouth Every 8 (Eight) Hours As Needed.       No current facility-administered medications for this visit.            *Examiner was wearing KN95 mask, face shield and exam gloves during the entire duration of the visit. Patient was masked the entire time.   Minimum social distance of 6 ft maintained entire visit except if physical contact was necessary as documented.     **Dragon Disclaimer:   Much of this encounter note is an electronic transcription/translation of spoken language to printed text. The electronic translation of spoken language may permit erroneous, or at times, nonsensical words or phrases to be inadvertently transcribed. Although I have reviewed the note for such errors, some may still exist.     Template created by Kristal Jackson MD

## 2021-01-11 NOTE — PROGRESS NOTES
Spoke with pt, relayed results to him, he sts he had a flu shot prior to his labs, but doesn't feel sick, no fever or other signs of infection.

## 2021-01-11 NOTE — PROGRESS NOTES
Call patient with his test result(s) and mail the results to him if MyChart is NOT active.    Outside lab result reviewed showing elevated WBC. Verify whether he has any fever or other signs/symptoms of infection.

## 2021-01-11 NOTE — PROGRESS NOTES
Please notify patient that CMP and CBC are OK in regards to continuing NSAIDs.  He has some abnormalities on his WBC indicating possible infection.  These results have been forwarded to Dr. Jackson.

## 2021-01-11 NOTE — PROGRESS NOTES
Dr. Jackson,     Please see abnormal CBC results on this mutual patient.     Thank you,  CIERA Gresham

## 2021-01-12 DIAGNOSIS — M25.562 CHRONIC PAIN OF LEFT KNEE: Primary | ICD-10-CM

## 2021-01-12 DIAGNOSIS — G89.29 CHRONIC PAIN OF LEFT KNEE: Primary | ICD-10-CM

## 2021-01-12 DIAGNOSIS — M23.92 LIGAMENTOUS LAXITY OF LEFT KNEE: ICD-10-CM

## 2021-01-19 RX ORDER — FREMANEZUMAB-VFRM 225 MG/1.5ML
INJECTION SUBCUTANEOUS
Qty: 1.5 SYRINGE | Refills: 2 | Status: SHIPPED | OUTPATIENT
Start: 2021-01-19 | End: 2021-05-04

## 2021-01-29 ENCOUNTER — OFFICE VISIT (OUTPATIENT)
Dept: SURGERY | Facility: CLINIC | Age: 46
End: 2021-01-29

## 2021-01-29 VITALS
TEMPERATURE: 97.1 F | OXYGEN SATURATION: 98 % | HEART RATE: 72 BPM | HEIGHT: 69 IN | BODY MASS INDEX: 28.42 KG/M2 | WEIGHT: 191.9 LBS | SYSTOLIC BLOOD PRESSURE: 126 MMHG | DIASTOLIC BLOOD PRESSURE: 78 MMHG

## 2021-01-29 DIAGNOSIS — K64.8 INTERNAL HEMORRHOIDS WITH COMPLICATION: Primary | ICD-10-CM

## 2021-01-29 PROCEDURE — 99212 OFFICE O/P EST SF 10 MIN: CPT | Performed by: COLON & RECTAL SURGERY

## 2021-02-04 ENCOUNTER — HOSPITAL ENCOUNTER (OUTPATIENT)
Dept: MRI IMAGING | Facility: HOSPITAL | Age: 46
Discharge: HOME | End: 2021-02-04
Admitting: INTERNAL MEDICINE

## 2021-02-04 DIAGNOSIS — M23.92 LIGAMENTOUS LAXITY OF LEFT KNEE: ICD-10-CM

## 2021-02-04 DIAGNOSIS — G89.29 CHRONIC PAIN OF LEFT KNEE: ICD-10-CM

## 2021-02-04 DIAGNOSIS — M25.562 CHRONIC PAIN OF LEFT KNEE: ICD-10-CM

## 2021-02-04 PROCEDURE — 73721 MRI JNT OF LWR EXTRE W/O DYE: CPT

## 2021-02-05 NOTE — PROGRESS NOTES
Call patient with result:     MRI of the left knee shows medial compartment cartilage defect.   If you are still having significant problems, I would recommend seeing an orthopedic specialist.   Would you like for me to place a referral?      Please do not hesitate to contact me if you have questions.

## 2021-02-08 DIAGNOSIS — M25.562 CHRONIC PAIN OF LEFT KNEE: Primary | ICD-10-CM

## 2021-02-08 DIAGNOSIS — R93.6 ABNORMAL MRI, KNEE: ICD-10-CM

## 2021-02-08 DIAGNOSIS — G89.29 CHRONIC PAIN OF LEFT KNEE: Primary | ICD-10-CM

## 2021-02-10 ENCOUNTER — OFFICE VISIT (OUTPATIENT)
Dept: ORTHOPEDIC SURGERY | Facility: CLINIC | Age: 46
End: 2021-02-10

## 2021-02-10 VITALS — TEMPERATURE: 96.9 F | HEIGHT: 69 IN | BODY MASS INDEX: 28.41 KG/M2 | WEIGHT: 191.8 LBS

## 2021-02-10 DIAGNOSIS — M17.12 ARTHRITIS OF LEFT KNEE: Primary | ICD-10-CM

## 2021-02-10 PROCEDURE — 20610 DRAIN/INJ JOINT/BURSA W/O US: CPT | Performed by: ORTHOPAEDIC SURGERY

## 2021-02-10 PROCEDURE — 99204 OFFICE O/P NEW MOD 45 MIN: CPT | Performed by: ORTHOPAEDIC SURGERY

## 2021-02-10 RX ORDER — METHYLPREDNISOLONE ACETATE 80 MG/ML
80 INJECTION, SUSPENSION INTRA-ARTICULAR; INTRALESIONAL; INTRAMUSCULAR; SOFT TISSUE
Status: COMPLETED | OUTPATIENT
Start: 2021-02-10 | End: 2021-02-10

## 2021-02-10 RX ADMIN — METHYLPREDNISOLONE ACETATE 80 MG: 80 INJECTION, SUSPENSION INTRA-ARTICULAR; INTRALESIONAL; INTRAMUSCULAR; SOFT TISSUE at 09:40

## 2021-02-10 NOTE — PROGRESS NOTES
"Patient Name: Sadiq Alexander   YOB: 1975  Referring Primary Care Physician: Charly Jackson MD  BMI: Body mass index is 28.31 kg/m².    Chief Complaint:    Chief Complaint   Patient presents with   • Left Knee - Establish Care, Pain        HPI:     Sadiq Alexander is a 45 y.o. male who presents today for evaluation of   Chief Complaint   Patient presents with   • Left Knee - Establish Care, Pain   .  Patient is seen today complaining of chronic left knee pain.  He gets a sharp pain in it it can get stiff and bother him.  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  He has had chronic back pain after a back fracture and takes baclofen Flexeril and diclofenac.  He had an ablation in his spine that helped at first but now is bothering him.  He has gained a few pounds he is try to get active but the knee bothers him he did do some physical therapy and tries to do it on his own and he is a .  He had an MRI apparently with some findings and he \"wanted to get it checked out.\"      Subjective   Medications:   Home Medications:  Current Outpatient Medications on File Prior to Visit   Medication Sig   • Ajovy 225 MG/1.5ML solution prefilled syringe INJECT 1.5 ML UNDER THE SKIN INTO THE APPROPRIATE AREA AS DIRECTED EVERY 30 (THIRTY) DAYS.   • cyclobenzaprine (FLEXERIL) 10 MG tablet TAKE 1 TABLET BY MOUTH THREE TIMES A DAY   • diclofenac (VOLTAREN) 50 MG EC tablet TAKE 1 TABLET BY MOUTH 2 (TWO) TIMES A DAY AS NEEDED (PAIN). TAKE WITH FOOD   • Dietary Management Product (Vasculera) tablet Take 1 tablet by mouth Daily.   • DULoxetine (CYMBALTA) 30 MG capsule TAKE 1 CAPSULE BY MOUTH EVERY DAY   • fluticasone (FLONASE) 50 MCG/ACT nasal spray 2 sprays into each nostril Daily. (Patient taking differently: 2 sprays into the nostril(s) as directed by provider Daily As Needed.)   • hydrocortisone (ANUSOL-HC) 25 MG suppository 1 per rectum at bedtime as needed for hemorrhoidal bleeding   • Hydrocortisone, " Perianal, (Anusol-HC) 2.5 % rectal cream Apply rectally 3 times daily.  Include applicator.   • ondansetron (ZOFRAN) 8 MG tablet Take 8 mg by mouth Every 8 (Eight) Hours As Needed.   • pantoprazole (PROTONIX) 40 MG EC tablet TAKE 1 TABLET BY MOUTH EVERY DAY   • rosuvastatin (CRESTOR) 10 MG tablet TAKE 1 TABLET BY MOUTH EVERY DAY   • ZOLMitriptan (ZOMIG-ZMT) 5 MG disintegrating tablet TAKE 1 TABLET BY MOUTH 2 (TWO) TIMES A DAY AS NEEDED FOR MIGRAINE.     No current facility-administered medications on file prior to visit.      Current Medications:  Scheduled Meds:  Continuous Infusions:No current facility-administered medications for this visit.     PRN Meds:.    I have reviewed the patient's medical history in detail and updated the computerized patient record.  Review and summarization of old records includes:    Past Medical History:   Diagnosis Date   • ADHD    • Anxiety    • Arthritis    • Biliary dyskinesia 02/05/2019    [facility].   • Blood in stool    • Bowel incontinence    • Chronic low back pain    • Colon polyps    • Constipation    • Degenerative lumbar disc    • Elevated cholesterol    • Fatigue    • GERD (gastroesophageal reflux disease)    • GERD with esophagitis    • Hemorrhoids 05/24/2017   • Hiatal hernia 11/26/2018    Dr. Chato Allen.   • Hyperlipidemia    • IBS (irritable bowel syndrome)     IBS WITH BOTH CONSTIPATION & DIARRHEA.   • Low back pain 10/10/2019    [facility], Right sided low back pain without sciatica.   • Lumbar facet arthropathy    • Lumbar paraspinal muscle spasm    • Lumbar spondylosis    • Migraines    • Multiple atypical skin moles     See's derm and has family history of melanoma.   • Muscle spasm    • Myalgia    • Osteoarthritis of lumbar spine    • RLS (restless legs syndrome)    • RUQ abdominal pain    • Sleep disturbance    • Sprain and strain of left ankle 12/22/2013    [facility], ankle injury.   • Synovial cyst of lumbar facet joint    • Thumb laceration,  left, initial encounter 2017    [facility], cut it while cooking.        Past Surgical History:   Procedure Laterality Date   • CHOLECYSTECTOMY WITH INTRAOPERATIVE CHOLANGIOGRAM N/A 2019    Procedure: CHOLECYSTECTOMY LAPAROSCOPIC INTRAOPERATIVE CHOLANGIOGRAM;  Surgeon: Reji Hoffman MD;  Location: [facility];  Service: General   • COLONOSCOPY N/A 2015    Two 4-6 mm polyps at the recto-sigmoid and in the mid ascending colon (PATH: tubulovillous adenoma w/ low-grade dysplasia & hyperplastic colon polyp at 20 cm), internal hemorrhoids; Dr. Chato Allen   • COLONOSCOPY N/A 2018    Tortuous colon, internal hemorrhoids, Dr. Chato Allen.   • EYE SURGERY      [facility].   • MEDIAL BRANCH BLOCK Bilateral     medial branch block (Bilateral lumbar 3 - Lumbar 5 synovial cyst), Dr. Nathaniel Hurst.   • RADIOFREQUENCY ABLATION Bilateral     L3-L5 Radiofrequency ablation lumbar, Surgeon Nathaniel Hurst.   • REFRACTIVE SURGERY Bilateral    • SHOULDER SURGERY Bilateral ,    Left Shoulder  Dr. Mehta, Right Shoulder  Dr. Mehta   • THROMBECTOMY  2017    Procedure; External hemorrhoid thrombectomy, Dr. Hardy.        Social History     Occupational History   • Occupation: Software consulting   Tobacco Use   • Smoking status: Former Smoker     Packs/day: 0.75     Years: 0.00     Pack years: 0.00     Start date: 3/8/2019     Quit date: 2019     Years since quittin.4   • Smokeless tobacco: Former User     Types: Chew     Quit date: 3/8/2019   • Tobacco comment: when he drinks   Substance and Sexual Activity   • Alcohol use: Yes     Alcohol/week: 3.0 standard drinks     Types: 1 Glasses of wine, 1 Cans of beer, 1 Shots of liquor per week     Frequency: 2-3 times a week     Drinks per session: 1 or 2     Comment: drinks occasionally 1-2 drinks per week   • Drug use: Yes     Types: Marijuana     Comment: used marijuana in the past in college   • Sexual  "activity: Yes     Partners: Female     Comment: .      Social History     Social History Narrative   • Not on file        Family History   Problem Relation Age of Onset   • Melanoma Father    • Depression Father    • No Known Problems Sister    • Parkinsonism Maternal Grandmother    • Lung cancer Maternal Grandfather         smoker   • Andrew's disease Maternal Grandfather    • Lung cancer Paternal Grandmother         smoker   • Lung cancer Paternal Aunt         smoker   • Cancer Maternal Aunt         spine   • Heart attack Paternal Uncle 55   • Colon cancer Neg Hx    • Prostate cancer Neg Hx        ROS: 14 point review of systems was performed and all other systems were reviewed and are negative except for documented findings in HPI and today's encounter.     Allergies: No Known Allergies  Constitutional:  Denies fever, shaking or chills   Eyes:  Denies change in visual acuity   HENT:  Denies nasal congestion or sore throat   Respiratory:  Denies cough or shortness of breath   Cardiovascular:  Denies chest pain or severe LE edema   GI:  Denies abdominal pain, nausea, vomiting, bloody stools or diarrhea   Musculoskeletal:  Numbness, tingling, pain, or loss of motor function only as noted above in history of present illness.  : Denies painful urination or hematuria  Integument:  Denies rash, lesion or ulceration   Neurologic:  Denies headache or focal weakness  Endocrine:  Denies lymphadenopathy  Psych:  Denies confusion or change in mental status   Hem:  Denies active bleeding    OBJECTIVE:  Physical Exam: 45 y.o. male  Wt Readings from Last 3 Encounters:   02/10/21 87 kg (191 lb 12.8 oz)   01/29/21 87 kg (191 lb 14.4 oz)   01/08/21 84.8 kg (187 lb)     Ht Readings from Last 1 Encounters:   02/10/21 175.3 cm (69.02\")     Body mass index is 28.31 kg/m².  Vitals:    02/10/21 0855   Temp: 96.9 °F (36.1 °C)     Vital signs reviewed.     General Appearance:    Alert, cooperative, in no acute distress            "       Eyes: conjunctiva clear  ENT: external ears and nose atraumatic  CV: no peripheral edema  Resp: normal respiratory effort  Skin: no rashes or wounds; normal turgor  Psych: mood and affect appropriate  Lymph: no nodes appreciated  Neuro: gross sensation intact  Vascular:  Palpable peripheral pulse in noted extremity  Musculoskeletal Extremities: Exam today shows pleasant man he has slight swelling in the left knee compared to right knee with some medial joint line tenderness Marilyn's is negative he has some crepitation throughout range of motion ligamentous exam feels fairly stable with a little pseudolaxity and that he is not tender along the medial femoral condyle superior to the joint line or posterior.  He walks without a limp.    Radiology:   AP lateral and rides view left knee taken at [facility] viewed in epic at this time shows minor degenerative type changes.  He had an MRI in [facility] that is viewed that shows a intraosseous lipoma and an area along the medial femoral condyle with chondral loss as well.  There is degeneration but no obvious tears in the menisci.    Assessment:     ICD-10-CM ICD-9-CM   1. Arthritis of left knee  M17.12 716.96        Large Joint Arthrocentesis: L knee  Date/Time: 2/10/2021 9:40 AM  Consent given by: patient  Site marked: site marked  Timeout: Immediately prior to procedure a time out was called to verify the correct patient, procedure, equipment, support staff and site/side marked as required   Supporting Documentation  Indications: pain   Procedure Details  Location: knee - L knee  Preparation: Patient was prepped and draped in the usual sterile fashion  Needle gauge: 21G.  Approach: anterolateral  Medications administered: 4 mL lidocaine (cardiac); 80 mg methylPREDNISolone acetate 80 MG/ML               Plan: The diagnosis(es), natural history, pathophysiology and treatment for diagnosis(es) were discussed. Opportunity given and questions answered.  Biomechanics of  pertinent body areas discussed.  When appropriate, the use of ambulatory aids discussed.  MEDICATIONS:  The risks, benefits, warnings,side effects and alternatives of medications discussed.  Inflammation/pain control; with cold, heat, elevation and/or liniments discussed as appropriate  Cortisone Injection. See procedure note.  HOME EXERCISE/PT program encouraged  Handouts given for home physical therapy exercises      2/10/2021    Much of this encounter note is an electronic transcription/translation of spoken language to printed text. The electronic translation of spoken language may permit erroneous, or at times, nonsensical words or phrases to be inadvertently transcribed; Although I have reviewed the note for such errors, some may still exist

## 2021-02-15 RX ORDER — ROSUVASTATIN CALCIUM 10 MG/1
TABLET, COATED ORAL
Qty: 30 TABLET | Refills: 5 | Status: SHIPPED | OUTPATIENT
Start: 2021-02-15 | End: 2022-02-16 | Stop reason: SDUPTHER

## 2021-02-23 ENCOUNTER — PREP FOR SURGERY (OUTPATIENT)
Dept: SURGERY | Facility: SURGERY CENTER | Age: 46
End: 2021-02-23

## 2021-02-23 ENCOUNTER — OFFICE VISIT (OUTPATIENT)
Dept: PAIN MEDICINE | Facility: CLINIC | Age: 46
End: 2021-02-23

## 2021-02-23 VITALS
DIASTOLIC BLOOD PRESSURE: 75 MMHG | RESPIRATION RATE: 18 BRPM | HEIGHT: 69 IN | TEMPERATURE: 97.7 F | BODY MASS INDEX: 29.03 KG/M2 | HEART RATE: 90 BPM | OXYGEN SATURATION: 98 % | SYSTOLIC BLOOD PRESSURE: 125 MMHG | WEIGHT: 196 LBS

## 2021-02-23 DIAGNOSIS — M47.816 LUMBAR FACET ARTHROPATHY: Primary | Chronic | ICD-10-CM

## 2021-02-23 DIAGNOSIS — M62.838 MUSCLE SPASM: ICD-10-CM

## 2021-02-23 DIAGNOSIS — G89.29 CHRONIC MIDLINE BACK PAIN, UNSPECIFIED BACK LOCATION: Chronic | ICD-10-CM

## 2021-02-23 DIAGNOSIS — M54.9 CHRONIC MIDLINE BACK PAIN, UNSPECIFIED BACK LOCATION: Chronic | ICD-10-CM

## 2021-02-23 DIAGNOSIS — M51.36 DEGENERATIVE LUMBAR DISC: Chronic | ICD-10-CM

## 2021-02-23 PROCEDURE — 99214 OFFICE O/P EST MOD 30 MIN: CPT | Performed by: NURSE PRACTITIONER

## 2021-02-23 RX ORDER — SODIUM CHLORIDE 0.9 % (FLUSH) 0.9 %
10 SYRINGE (ML) INJECTION EVERY 12 HOURS SCHEDULED
Status: CANCELLED | OUTPATIENT
Start: 2021-02-23

## 2021-02-23 RX ORDER — SODIUM CHLORIDE 0.9 % (FLUSH) 0.9 %
10 SYRINGE (ML) INJECTION AS NEEDED
Status: CANCELLED | OUTPATIENT
Start: 2021-02-23

## 2021-02-23 NOTE — PROGRESS NOTES
CHIEF COMPLAINT  Back pain is starting to increased     Subjective   Sadiq Alexander is a 45 y.o. male  who presents for follow-up.  He has a history of back pain. Today his pain is 3/10VAS in severity. He completed a Bilateral L3-L5 RFL on 5/27/2020 with reported 60% relief.  As his pain has started to return over the last month and now that his pain has returned he feels he was getting closer to 75-80% relief from the RFL x 8 months.  He would like to repeat this procedure at this time.     Today his pain is 3/10VAS in severity and is in his low back radiating in a band like fashion.  He denies any pain radiating into his lower extremities.  He reports intermittent muscle spasms in his low back that cause pain to radiate into his mid back.  He continues to utilize flexeril 10 mg sparingly for muscle spasms.     Patient remained masked during entire encounter. No cough present. I donned a mask and eye protection throughout entire visit. Prior to donning mask and eye protection, hand hygiene was performed, as well as when it was doffed.  I was closer than 6 feet, but not for an extended period of time. No obvious exposure to any bodily fluids.    Back Pain  This is a chronic problem. The current episode started more than 1 year ago. The problem occurs constantly. The problem has been gradually worsening (increased since last office visit) since onset. The pain is present in the lumbar spine. The quality of the pain is described as aching. The pain does not radiate. The pain is at a severity of 3/10. Worse during: worsens as day progresses. The symptoms are aggravated by bending, position, standing and twisting. Associated symptoms include bowel incontinence. Pertinent negatives include no bladder incontinence, chest pain, dysuria, fever, headaches, numbness or weakness. He has tried chiropractic manipulation, heat, home exercises, analgesics, bed rest, ice, muscle relaxant, NSAIDs and walking (PT, MBB-significant  "diagnostic relief) for the symptoms. The treatment provided moderate (RFL) relief.      PEG Assessment   What number best describes your pain on average in the past week?3  What number best describes how, during the past week, pain has interfered with your enjoyment of life?3  What number best describes how, during the past week, pain has interfered with your general activity?  4    The following portions of the patient's history were reviewed and updated as appropriate: allergies, current medications, past family history, past medical history, past social history, past surgical history and problem list.    Review of Systems   Constitutional: Negative for chills and fever.   Respiratory: Negative for shortness of breath.    Cardiovascular: Negative for chest pain.   Gastrointestinal: Positive for bowel incontinence. Negative for constipation, diarrhea, nausea and vomiting.   Genitourinary: Negative for bladder incontinence, difficulty urinating, dysuria and enuresis.   Musculoskeletal: Positive for back pain.   Neurological: Negative for dizziness, weakness, light-headedness, numbness and headaches.   Psychiatric/Behavioral: Negative for confusion, hallucinations, self-injury, sleep disturbance and suicidal ideas. The patient is not nervous/anxious.    All other systems reviewed and are negative.    --  The aforementioned information the Chief Complaint section and above subjective data including any HPI data, and also the Review of Systems data, has been personally reviewed and affirmed.  --    Vitals:    02/23/21 1036   BP: 125/75   Pulse: 90   Resp: 18   Temp: 97.7 °F (36.5 °C)   SpO2: 98%   Weight: 88.9 kg (196 lb)   Height: 175.3 cm (69.02\")   PainSc:   3   PainLoc: Back     Objective   Physical Exam  Vitals signs and nursing note reviewed.   Constitutional:       Appearance: Normal appearance. He is well-developed.   Eyes:      General: Lids are normal.   Neck:      Musculoskeletal: Normal range of motion. "   Cardiovascular:      Rate and Rhythm: Normal rate.   Pulmonary:      Effort: Pulmonary effort is normal.   Musculoskeletal:      Lumbar back: He exhibits decreased range of motion, tenderness and bony tenderness.      Comments: POS Lumbar Loading Manuever   Neurological:      Mental Status: He is alert and oriented to person, place, and time.      Gait: Gait normal.   Psychiatric:         Attention and Perception: Attention normal.         Mood and Affect: Mood normal.         Speech: Speech normal.         Behavior: Behavior normal.         Judgment: Judgment normal.       Assessment/Plan   Diagnoses and all orders for this visit:    1. Lumbar facet arthropathy (Primary)    2. Chronic midline back pain, unspecified back location    3. Degenerative lumbar disc    4. Muscle spasm      --- Repeat Bilateral L3-L5 radiofrequency lesioning  --------  Education about Radiofrequency Lesioning of Medial Branches:    In the RF procedure, needles are placed to the joint lines in the same fashion, and after testing, the needle tips are heated to thermally treat the nerves, blocking the nerves by in essence damaging the nerves with the heat treatment.      Medically, a successful RF procedure should provide a patient with 50% pain relief or more for at least 6 months.  We estimate a likelihood of about an 80% chance that medical success will be realized.  We discussed & educated once again that not all patients have a medically successful result, and the patient voices understanding.  However, our clinical experience suggests that successful patients receive relief more in the range of 12 months on average.  (We also discussed that a fortunate minority of patients receive therapeutic success from the MBB, and may not require RF ablation.  If a patient receives more than 8 weeks of relief from MBB, then occasional repeat MBB for therapeutic purposes is a very reasonable alternative therapy.  This course of therapy is  consistent with our LCDs according to our CMS  in the area, and therefore other insurance providers should follow accordingly.  We will monitor our patients to screen for these therapeutic responders and will offer RF therapy only when necessary.  However, in this clinical scenario, this therapeutic result was not realized, and therefore Radiofrequency Lesioning is medically necessary.)      We discussed that MBB & RF are not without risks.  Guidelines regarding anticoagulant use & neuraxial procedures will be respected.  Patients that are ill or otherwise may be at risk for sepsis will not have their spines accessed by neuraxial injections of any type.  This patient will not be offered these therapies if there is an increased risk.   We discussed that there is a risk of postprocedural pain and also a risk of worsening of clinical picture with these procedures as with any neuraxial procedure.  All invasive procedures have risks including but not limited to bleeding, infection, injury, nerve injury, paralysis, coma, death, lack of pain relief, and worsening of clinical picture.      In this education session, all of these topics were covered and the patient voiced understanding.    ---------  --- Follow-up after procedure     JEIMY REPORT    JEIMY report has been reviewed and scanned into the patient's chart.    As the clinician, I personally reviewed the JEIMY from 2/22/2021 while the patient was in the office today.    EMR Dragon/Transcription disclaimer:   Much of this encounter note is an electronic transcription/translation of spoken language to printed text. The electronic translation of spoken language may permit erroneous, or at times, nonsensical words or phrases to be inadvertently transcribed; Although I have reviewed the note for such errors, some may still exist.

## 2021-03-08 PROBLEM — G89.29 CHRONIC MIDLINE BACK PAIN: Status: ACTIVE | Noted: 2021-03-08

## 2021-03-08 PROBLEM — M54.9 CHRONIC MIDLINE BACK PAIN: Status: ACTIVE | Noted: 2021-03-08

## 2021-03-11 NOTE — SIGNIFICANT NOTE
Patient educated on the following :    - If you are receiving Sedation for your procedure Nothing to Eat after 0630     and only clear liquids until     1030  -Your required COVID Test is Scheduled on 3/13/21         Between the Hours of 7302-2928  -You will only be notified if your COVID test Result is POSITIVE  -The importance of reducing your number of contacts by self quarantining after you COVID test until the date of your PROCEDURE  -You will need to have someone drive you home after your PROCEDURE and remain with you for 24 hours after the PROCEDURE  - The date of your PROCEDURE, your ride is welcome to either remain in our parking lot or within 10-15 minutes of alike  -You will need to arrive at  1230         on  3/16/21         for your PROCEDURE  -Please contact alike [facility] at: [phone] with any questions and/or concerns

## 2021-03-12 ENCOUNTER — TRANSCRIBE ORDERS (OUTPATIENT)
Dept: LAB | Facility: SURGERY CENTER | Age: 46
End: 2021-03-12

## 2021-03-12 DIAGNOSIS — Z01.818 OTHER SPECIFIED PRE-OPERATIVE EXAMINATION: Primary | ICD-10-CM

## 2021-03-13 ENCOUNTER — LAB (OUTPATIENT)
Dept: LAB | Facility: SURGERY CENTER | Age: 46
End: 2021-03-13

## 2021-03-13 DIAGNOSIS — Z01.818 OTHER SPECIFIED PRE-OPERATIVE EXAMINATION: ICD-10-CM

## 2021-03-13 LAB — SARS-COV-2 ORF1AB RESP QL NAA+PROBE: NOT DETECTED

## 2021-03-13 PROCEDURE — U0004 COV-19 TEST NON-CDC HGH THRU: HCPCS

## 2021-03-13 PROCEDURE — C9803 HOPD COVID-19 SPEC COLLECT: HCPCS

## 2021-03-16 ENCOUNTER — HOSPITAL ENCOUNTER (OUTPATIENT)
Facility: SURGERY CENTER | Age: 46
Setting detail: HOSPITAL OUTPATIENT SURGERY
Discharge: HOME | End: 2021-03-16
Attending: ANESTHESIOLOGY | Admitting: ANESTHESIOLOGY

## 2021-03-16 ENCOUNTER — HOSPITAL ENCOUNTER (OUTPATIENT)
Dept: GENERAL RADIOLOGY | Facility: SURGERY CENTER | Age: 46
Setting detail: HOSPITAL OUTPATIENT SURGERY
End: 2021-03-16

## 2021-03-16 ENCOUNTER — TRANSCRIBE ORDERS (OUTPATIENT)
Dept: SURGERY | Facility: SURGERY CENTER | Age: 46
End: 2021-03-16

## 2021-03-16 VITALS
BODY MASS INDEX: 28.14 KG/M2 | RESPIRATION RATE: 16 BRPM | WEIGHT: 190 LBS | HEART RATE: 76 BPM | SYSTOLIC BLOOD PRESSURE: 124 MMHG | HEIGHT: 69 IN | OXYGEN SATURATION: 96 % | TEMPERATURE: 98.2 F | DIASTOLIC BLOOD PRESSURE: 89 MMHG

## 2021-03-16 DIAGNOSIS — M47.816 LUMBAR FACET ARTHROPATHY: Chronic | ICD-10-CM

## 2021-03-16 DIAGNOSIS — M54.9 CHRONIC MIDLINE BACK PAIN, UNSPECIFIED BACK LOCATION: Chronic | ICD-10-CM

## 2021-03-16 DIAGNOSIS — Z41.9 SURGERY, ELECTIVE: Primary | ICD-10-CM

## 2021-03-16 DIAGNOSIS — Z41.9 SURGERY, ELECTIVE: ICD-10-CM

## 2021-03-16 DIAGNOSIS — G89.29 CHRONIC MIDLINE BACK PAIN, UNSPECIFIED BACK LOCATION: Chronic | ICD-10-CM

## 2021-03-16 PROCEDURE — 64636 DESTROY L/S FACET JNT ADDL: CPT | Performed by: ANESTHESIOLOGY

## 2021-03-16 PROCEDURE — 25010000002 FENTANYL CITRATE (PF) 100 MCG/2ML SOLUTION: Performed by: ANESTHESIOLOGY

## 2021-03-16 PROCEDURE — 99152 MOD SED SAME PHYS/QHP 5/>YRS: CPT | Performed by: ANESTHESIOLOGY

## 2021-03-16 PROCEDURE — 64635 DESTROY LUMB/SAC FACET JNT: CPT | Performed by: ANESTHESIOLOGY

## 2021-03-16 PROCEDURE — 3E0T3BZ INTRODUCTION OF ANESTHETIC AGENT INTO PERIPHERAL NERVES AND PLEXI, PERCUTANEOUS APPROACH: ICD-10-PCS | Performed by: ANESTHESIOLOGY

## 2021-03-16 RX ORDER — SODIUM CHLORIDE 0.9 % (FLUSH) 0.9 %
10 SYRINGE (ML) INJECTION EVERY 12 HOURS SCHEDULED
Status: DISCONTINUED | OUTPATIENT
Start: 2021-03-16 | End: 2021-03-16 | Stop reason: HOSPADM

## 2021-03-16 RX ORDER — SODIUM CHLORIDE 0.9 % (FLUSH) 0.9 %
10 SYRINGE (ML) INJECTION AS NEEDED
Status: DISCONTINUED | OUTPATIENT
Start: 2021-03-16 | End: 2021-03-16 | Stop reason: HOSPADM

## 2021-03-16 RX ORDER — FENTANYL CITRATE 50 UG/ML
INJECTION, SOLUTION INTRAMUSCULAR; INTRAVENOUS AS NEEDED
Status: DISCONTINUED | OUTPATIENT
Start: 2021-03-16 | End: 2021-03-16 | Stop reason: HOSPADM

## 2021-03-16 NOTE — DISCHARGE INSTRUCTIONS
[facility] [place] [facility] - Post-procedure Instructions          --  While there are no absolute restrictions, it is recommended that you do not perform strenuous activity today. In the morning, you may resume your level of activity as before your block.    --  If you have a band-aid at your injection site, please remove it later today. Observe the area for any redness, swelling, pus-like drainage, or a temperature over 101°. If any of these symptoms occur, please call your doctor at [phone]. If after office hours, leave a message and the on-call provider will return your call.    --  Ice may be applied to your injection site. It is recommended you avoid direct heat (heating pad; hot tub) for 1-2 days.    --  Call [facility] [place] at [phone] if you experience persistent headache, persistent bleeding from the injection site, or severe pain not relieved by heat or oral medication.    --  Do not make important decisions today.    --  Due to the effects of the block and/or the I.V. Sedation, DO NOT drive or operate hazardous machinery for 12 hours.    --  Do not drink alcohol for 12 hours.    -- You may return to work tomorrow, or as directed by your referring doctor.    --  Occasionally you may notice a slight increase in your pain after the procedure. This should start to improve within the next 24-48 hours. Radiofrequency ablation procedure pain may last 3-4 weeks.    --  It may take as long as 3-4 days before you notice a gradual improvement in your pain and/or other symptoms.    -- You may continue to take your prescribed pain medication as needed.    --  Some normal possible side effects of steroid use could include fluid retention, increased blood sugar, dull headache, increased sweating, increased appetite, mood swings and flushing.    --  Diabetics are recommended to watch their blood glucose level closely for 24-48 hours after the injection.    --  Must stay in PACU for 20 min upon arrival and  prove no leg weakness before being discharged.    --  IN THE EVENT OF A LIFE THREATENING EMERGENCY, (CHEST PAIN, BREATHING DIFFICULTIES, PARALYSIS…) YOU SHOULD GO TO YOUR NEAREST EMERGENCY ROOM.    --  You should be contacted by our office within 2-3 days to schedule follow up or next appointment date.  If not contacted within 7 days, please call the office at [phone]

## 2021-03-16 NOTE — OP NOTE
Bilateral L3-5 Lumbar Medial Branch RADIOFREQUENCY  [facility]      PREOPERATIVE DIAGNOSIS:  Lumbar spondylosis without myelopathy    POSTOPERATIVE DIAGNOSIS:  Lumbar spondylosis without myelopathy    PROCEDURE:   Diagnostic Bilateral Lumbar Medial Branch Nerve thermal radiofrequency lesioning, with fluoroscopy:  L3, L4, and L5 nerves (at the L4 and L5 transverse processes and the sacral alar groove) to thermally treat the innervation to facet joints L4-5 and L5-S1  1. 44318-11 -- Bilateral L/S facet neuro destr., 1st Level  2. 22606-38 -- Bilateral L/S facet neuro destr., 2nd  Level    PRE-PROCEDURE DISCUSSION WITH PATIENT:    Risks and complications were discussed with the patient prior to starting the procedure and informed consent was obtained.      SURGEON:  Nathaniel Hurst MD    REASON FOR PROCEDURE:    Previous clinically significant therapeutic effect after prior Radiofrequency procedure    SEDATION:  Fentanyl 100 mcg IV  TIME OF PROCEDURE:   The intraoperative procedure time after administration of the sedative was 20 minutes.     ANESTHETIC:  Lidocaine 1%  STEROID:  NONE      DESCRIPTON OF PROCEDURE:  After obtaining informed consent, IV access  was obtained in the preoperative area.   The patient was taken to the operating room.  The patient was placed in the prone position with a pillow under the abdomen. All pressure points were well padded.  EKG, blood pressure, and pulse oximeter were monitored.  The patient was monitored and sedated by the RN under my direction. The lumbosacral area was prepped with Chloraprep and draped in a sterile fashion.     Under fluoroscopic guidance the transverse processes of the L4 and L5 vertebrae at the junctions of the superior articular processes were identified on the right.  Also identified was the groove between the ala and the superior articular process of the sacrum on the ipsilateral side.  Skin and subcutaneous tissue were anesthetized with  1ml of 1% lidocaine above each of these points. Then, radiofrequency probe needles were advanced in this fluoro view to the above junctions.  Aspiration was negative for blood and CSF.  After confirming the position of the needle with fluoroscope in all views, testing was initiated.  First, sensory testing was started on each needle a 1V and 50Hz and slowly decreased until painful pressure stimulation diminished at 0.5V.  Next, motor testing was confirmed to be negative at 3V and 2Hz for any radicular stimulation.  Then 1mL of the local anesthetic was instilled in each needle.  Two minutes elapsed, and during this time a lateral fluoroscopic view was confirmed again to ensure the needles had not advanced nor retracted.  Then, Radiofrequency Lesioning was initiated for 2.5 minutes at 85 degrees Celsius.  Needles were removed intact from each of the areas.     A similar procedure was repeated to address the L3, L4, and L5 nerves on the contralateral side.   Onset of analgesia was noted.  Vital signs remained stable throughout.      ESTIMATED BLOOD LOSS:  <5 mL  SPECIMENS:  none    COMPLICATIONS:   No complications were noted., There was not any evidence of dural puncture.   and The patient did not have any signs of postprocedure numbness nor weakness.    TOLERANCE & DISCHARGE CONDITION:    The patient tolerated the procedure well.  The patient was transported to the recovery area without difficulties.  The patient was discharged to home under the care of family in stable and satisfactory condition.    PLAN OF CARE:  1. The patient was given our standard instruction sheet.  2. The patient will  Return to clinic 6 wks.  3. The patient will resume all medications as per the medication reconciliation sheet.

## 2021-04-02 ENCOUNTER — BULK ORDERING (OUTPATIENT)
Dept: CASE MANAGEMENT | Facility: OTHER | Age: 46
End: 2021-04-02

## 2021-04-02 DIAGNOSIS — Z23 IMMUNIZATION DUE: ICD-10-CM

## 2021-04-25 NOTE — PROGRESS NOTES
CHIEF COMPLAINT  F/u back pain. Pt had Bilateral L3-5 Lumbar Medial Branch RADIOFREQUENCY on 5/27/20.  Pt has been going to PT which has improved his pain. Pt sts dry needling at PT has been helping so far.  Pt sts he overexerted himself over the weekend kitchen remodeling and pain has increased since the recent weekend.     Subjective   Sadiq Alexander is a 45 y.o. male  who presents for follow-up.  He has a history of low back pain.  Today his pain is 3/10VAS in severity. He completed a Bilateral L3-L5 RFL on 5/27/2020 performed by Dr. Hurst.  He reports 75% initial pain relief from this procedure with 60% ONGOING relief from his bony tenderness and midline low back pain.     Today he complains of pain in his bilateral flanks.  He is currently in PT with dry needling and states that the dry needling helps decrease his pain 80%. He is taking flexeril 5 mg 1-3/day. He denies any side effects. We will increase his flexeril to 10 mg TID PRN for muscle spasm.     Back Pain   This is a chronic problem. The current episode started more than 1 year ago. The problem occurs constantly. The problem has been gradually worsening (variable since last office visit) since onset. The pain is present in the lumbar spine. The quality of the pain is described as aching. The pain does not radiate. The pain is at a severity of 3/10. Worse during: worsens as day progresses. The symptoms are aggravated by bending, position, standing and twisting. Associated symptoms include bowel incontinence, headaches (hx migraines) and weakness (back). Pertinent negatives include no bladder incontinence, chest pain, dysuria, fever or numbness. He has tried chiropractic manipulation, heat, home exercises, analgesics, bed rest, ice, muscle relaxant, NSAIDs and walking (PT, MBB-significant diagnostic relief) for the symptoms. The treatment provided moderate (RFL) relief.      PEG Assessment   What number best describes your pain on average in the past  "week?3  What number best describes how, during the past week, pain has interfered with your enjoyment of life?3  What number best describes how, during the past week, pain has interfered with your general activity?  3    The following portions of the patient's history were reviewed and updated as appropriate: allergies, current medications, past family history, past medical history, past social history, past surgical history and problem list.    Review of Systems   Constitutional: Positive for activity change (inc) and fatigue (occ). Negative for fever and unexpected weight change.   HENT: Negative for congestion.    Eyes: Negative for visual disturbance.   Respiratory: Negative for cough and shortness of breath.    Cardiovascular: Negative for chest pain.   Gastrointestinal: Positive for bowel incontinence. Negative for abdominal distention, constipation, diarrhea, nausea (occ with migraines) and vomiting.   Endocrine: Negative for polyuria.   Genitourinary: Positive for difficulty urinating. Negative for bladder incontinence and dysuria.   Musculoskeletal: Positive for back pain. Negative for gait problem and joint swelling.   Allergic/Immunologic: Negative for immunocompromised state.   Neurological: Positive for weakness (back) and headaches (hx migraines). Negative for dizziness, seizures, light-headedness and numbness.   Psychiatric/Behavioral: Positive for sleep disturbance (occ). Negative for agitation and suicidal ideas. The patient is not nervous/anxious.      Vitals:    07/29/20 1339   BP: 115/77   Pulse: 92   Resp: 20   Temp: 99.3 °F (37.4 °C)   SpO2: 98%   Weight: 87.8 kg (193 lb 9.6 oz)   Height: 175.3 cm (69\")   PainSc:   3   PainLoc: Back     Objective   Physical Exam   Constitutional: He is oriented to person, place, and time. He appears well-developed and well-nourished.   HENT:   Head: Normocephalic and atraumatic.   Eyes: Conjunctivae and EOM are normal.   Neck: Normal range of motion. "   Cardiovascular: Normal rate.   Pulmonary/Chest: Effort normal.   Musculoskeletal:        Lumbar back: He exhibits spasm.   POS Johnathan SI compression    Neurological: He is alert and oriented to person, place, and time. Gait normal.   Skin: Skin is warm and dry.   Psychiatric: He has a normal mood and affect. His behavior is normal. Judgment and thought content normal.   Nursing note and vitals reviewed.    Assessment/Plan   Sadiq was seen today for back pain.    Diagnoses and all orders for this visit:    Lumbar facet arthropathy    Other chronic pain    Muscle spasm    Other orders  -     cyclobenzaprine (FLEXERIL) 10 MG tablet; Take 1 tablet by mouth 3 (Three) Times a Day.      --- Continue PT with dry needling.   --- Consider Bilateral SI injections in the future if pain low back pain dose not resolve with PT, dry needling, and muscle relaxer  --- Follow-up if pain worsens or fails to improve     JEIMY REPORT    JEIMY report has been reviewed and scanned into the patient's chart.    As the clinician, I personally reviewed the JEIMY from 7/28/2020.    EMR Dragon/Transcription disclaimer:   Much of this encounter note is an electronic transcription/translation of spoken language to printed text. The electronic translation of spoken language may permit erroneous, or at times, nonsensical words or phrases to be inadvertently transcribed; Although I have reviewed the note for such errors, some may still exist.          Alert and oriented, no focal deficits, no motor or sensory deficits.

## 2021-04-28 ENCOUNTER — OFFICE VISIT (OUTPATIENT)
Dept: PAIN MEDICINE | Facility: CLINIC | Age: 46
End: 2021-04-28

## 2021-04-28 VITALS
SYSTOLIC BLOOD PRESSURE: 123 MMHG | TEMPERATURE: 97.3 F | HEART RATE: 74 BPM | OXYGEN SATURATION: 94 % | BODY MASS INDEX: 28.73 KG/M2 | DIASTOLIC BLOOD PRESSURE: 83 MMHG | HEIGHT: 69 IN | WEIGHT: 194 LBS | RESPIRATION RATE: 18 BRPM

## 2021-04-28 DIAGNOSIS — M51.36 DEGENERATIVE LUMBAR DISC: Chronic | ICD-10-CM

## 2021-04-28 DIAGNOSIS — M54.50 CHRONIC MIDLINE LOW BACK PAIN WITHOUT SCIATICA: Primary | ICD-10-CM

## 2021-04-28 DIAGNOSIS — G89.29 CHRONIC MIDLINE LOW BACK PAIN WITHOUT SCIATICA: Primary | ICD-10-CM

## 2021-04-28 DIAGNOSIS — G89.29 OTHER CHRONIC PAIN: ICD-10-CM

## 2021-04-28 DIAGNOSIS — M47.816 LUMBAR FACET ARTHROPATHY: Chronic | ICD-10-CM

## 2021-04-28 PROCEDURE — 99213 OFFICE O/P EST LOW 20 MIN: CPT | Performed by: NURSE PRACTITIONER

## 2021-04-28 NOTE — PROGRESS NOTES
CHIEF COMPLAINT  Back pain has decreased since last visit    Subjective   Sadiq Alexander is a 45 y.o. male  who presents to the office for follow-up of procedure.  He completed a Bilateral L3-5 Lumbar Medial Branch RADIOFREQUENCY   on  3/16/21 performed by Dr. Hurst for management of back pain. Patient reports 80% ONGOING relief from the procedure.     Today his pain is 3/10VAS in severity. He has been out of his diclofenac and has been  utilizing Advil for pain.  He requests to resume diclofenac today.  He continues with Flexeril PRN.  He reports some drowsiness with the flexeril, he utilizes this sparingly.     Patient remained masked during entire encounter. No cough present. I donned a mask and eye protection throughout entire visit. Prior to donning mask and eye protection, hand hygiene was performed, as well as when it was doffed.  I was closer than 6 feet, but not for an extended period of time. No obvious exposure to any bodily fluids.    Back Pain  This is a chronic problem. The current episode started more than 1 year ago. The problem occurs constantly. The problem has been gradually worsening (increased since last office visit) since onset. The pain is present in the lumbar spine. The quality of the pain is described as aching. The pain does not radiate. The pain is at a severity of 3/10. Worse during: worsens as day progresses. The symptoms are aggravated by bending, position, standing and twisting. Associated symptoms include bowel incontinence. Pertinent negatives include no bladder incontinence, chest pain, dysuria, fever, headaches, numbness or weakness. He has tried chiropractic manipulation, heat, home exercises, analgesics, bed rest, ice, muscle relaxant, NSAIDs and walking (PT, MBB-significant diagnostic relief) for the symptoms. The treatment provided moderate (RFL) relief.      PEG Assessment   What number best describes your pain on average in the past week?4  What number best describes how,  "during the past week, pain has interfered with your enjoyment of life?4  What number best describes how, during the past week, pain has interfered with your general activity?  3    The following portions of the patient's history were reviewed and updated as appropriate: allergies, current medications, past family history, past medical history, past social history, past surgical history and problem list.    Review of Systems   Constitutional: Negative for chills and fever.   Respiratory: Negative for shortness of breath.    Cardiovascular: Negative for chest pain.   Gastrointestinal: Positive for bowel incontinence. Negative for constipation, diarrhea, nausea and vomiting.   Genitourinary: Negative for bladder incontinence, difficulty urinating, dysuria and enuresis.   Musculoskeletal: Positive for back pain.   Neurological: Negative for dizziness, weakness, light-headedness, numbness and headaches.   Psychiatric/Behavioral: Negative for confusion, hallucinations, self-injury, sleep disturbance and suicidal ideas. The patient is not nervous/anxious.    All other systems reviewed and are negative.    --  The aforementioned information the Chief Complaint section and above subjective data including any HPI data, and also the Review of Systems data, has been personally reviewed and affirmed.  --     Vitals:    04/28/21 1257   BP: 123/83   Pulse: 74   Resp: 18   Temp: 97.3 °F (36.3 °C)   SpO2: 94%   Weight: 88 kg (194 lb)   Height: 175.3 cm (69\")   PainSc:   3   PainLoc: Back     Objective   Physical Exam  Vitals and nursing note reviewed.   Constitutional:       Appearance: Normal appearance. He is well-developed.   Eyes:      General: Lids are normal.   Cardiovascular:      Rate and Rhythm: Normal rate.   Pulmonary:      Effort: Pulmonary effort is normal.   Musculoskeletal:      Cervical back: Normal range of motion.      Lumbar back: Tenderness (muscular) present.   Neurological:      Mental Status: He is alert and " oriented to person, place, and time.   Psychiatric:         Attention and Perception: Attention normal.         Mood and Affect: Mood normal.         Speech: Speech normal.         Behavior: Behavior normal.         Judgment: Judgment normal.       Assessment/Plan   Diagnoses and all orders for this visit:    1. Chronic midline low back pain without sciatica (Primary)    2. Other chronic pain    3. Lumbar facet arthropathy    4. Degenerative lumbar disc    Other orders  -     diclofenac (VOLTAREN) 50 MG EC tablet; Take 1 tablet by mouth 2 (Two) Times a Day As Needed (pain). Take with food  Dispense: 60 tablet; Refill: 5      --- Resume diclofenac 50 mg PO BID with food.  --- Discussed that RFL can be repeated every 6 months as needed. Patient states understanding.   --- Follow-up when pain returns/worsens.      JEIMY REPORT    JEIMY report has been reviewed and scanned into the patient's chart.    As the clinician, I personally reviewed the JEIMY from 4/28/2021 while the patient was in the office today.    EMR Dragon/Transcription disclaimer:   Much of this encounter note is an electronic transcription/translation of spoken language to printed text. The electronic translation of spoken language may permit erroneous, or at times, nonsensical words or phrases to be inadvertently transcribed; Although I have reviewed the note for such errors, some may still exist.

## 2021-05-04 ENCOUNTER — TELEPHONE (OUTPATIENT)
Dept: SURGERY | Facility: CLINIC | Age: 46
End: 2021-05-04

## 2021-05-04 RX ORDER — FREMANEZUMAB-VFRM 225 MG/1.5ML
INJECTION SUBCUTANEOUS
Qty: 1.5 ML | Refills: 2 | Status: SHIPPED | OUTPATIENT
Start: 2021-05-04 | End: 2021-08-10

## 2021-05-04 RX ORDER — HYDROCORTISONE 25 MG/G
CREAM TOPICAL
Qty: 30 G | Refills: 1 | Status: SHIPPED | OUTPATIENT
Start: 2021-05-04 | End: 2021-07-07

## 2021-05-04 NOTE — TELEPHONE ENCOUNTER
[facility] pharmacy [phone].    Refill HYDROCORTISONE 2.5% RECTAL CRM.  Apply rectally 3 times daily. Include applicator.    Last O.V. 01/29/2021.    Approve or deny?    Thank you.

## 2021-07-07 RX ORDER — HYDROCORTISONE 25 MG/G
CREAM TOPICAL
Qty: 30 G | Refills: 1 | Status: SHIPPED | OUTPATIENT
Start: 2021-07-07 | End: 2021-12-16

## 2021-07-07 NOTE — TELEPHONE ENCOUNTER
Please advice, Approve or deny, Rx: HYDROCORTISONE 2.5% RECTAL CREAM.    Last filled 05/04/2021 with 1 refill. [facility].    Last O.V. 01/29/2021.    Thank you.

## 2021-08-10 RX ORDER — FREMANEZUMAB-VFRM 225 MG/1.5ML
INJECTION SUBCUTANEOUS
Qty: 1.5 ML | Refills: 1 | Status: SHIPPED | OUTPATIENT
Start: 2021-08-10 | End: 2021-09-27 | Stop reason: SDUPTHER

## 2021-09-03 RX ORDER — CYCLOBENZAPRINE HCL 10 MG
TABLET ORAL
Qty: 90 TABLET | Refills: 3 | Status: SHIPPED | OUTPATIENT
Start: 2021-09-03 | End: 2022-01-11

## 2021-09-27 NOTE — TELEPHONE ENCOUNTER
Caller: Sadiq Alexander    Relationship: Self    Medication requested (name and dosage):   Ajovy 225 MG/1.5ML solution prefilled syringe    Pharmacy where request should be sent: [facility]/pharmacy #[id number] - [place], [place] - [address]  - [phone]  - [phone] FX    Additional details provided by patient: PT HAS BEEN SCHEDULED FOR F/U APPT WITH ANTONIO CORONEL ON 11/9/21 AND ADDED TO WAITLIST    Best call back number: [phone]    PLEASE REVIEW AND ADVISE.    Yunior Dalton Rep   09/27/21 16:24 EDT

## 2021-09-28 RX ORDER — FREMANEZUMAB-VFRM 225 MG/1.5ML
1.5 INJECTION SUBCUTANEOUS
Qty: 1.5 ML | Refills: 1 | Status: SHIPPED | OUTPATIENT
Start: 2021-09-28 | End: 2021-11-09 | Stop reason: SDUPTHER

## 2021-11-01 ENCOUNTER — TRANSCRIBE ORDERS (OUTPATIENT)
Dept: SURGERY | Facility: SURGERY CENTER | Age: 46
End: 2021-11-01

## 2021-11-01 ENCOUNTER — OFFICE VISIT (OUTPATIENT)
Dept: PAIN MEDICINE | Facility: CLINIC | Age: 46
End: 2021-11-01

## 2021-11-01 ENCOUNTER — TELEPHONE (OUTPATIENT)
Dept: PAIN MEDICINE | Facility: CLINIC | Age: 46
End: 2021-11-01

## 2021-11-01 ENCOUNTER — PREP FOR SURGERY (OUTPATIENT)
Dept: SURGERY | Facility: SURGERY CENTER | Age: 46
End: 2021-11-01

## 2021-11-01 VITALS
TEMPERATURE: 96.8 F | HEART RATE: 89 BPM | BODY MASS INDEX: 28.79 KG/M2 | SYSTOLIC BLOOD PRESSURE: 114 MMHG | HEIGHT: 69 IN | DIASTOLIC BLOOD PRESSURE: 76 MMHG | WEIGHT: 194.4 LBS | RESPIRATION RATE: 16 BRPM | OXYGEN SATURATION: 97 %

## 2021-11-01 DIAGNOSIS — M47.816 LUMBAR FACET ARTHROPATHY: Primary | ICD-10-CM

## 2021-11-01 DIAGNOSIS — M54.50 CHRONIC MIDLINE LOW BACK PAIN WITHOUT SCIATICA: Primary | ICD-10-CM

## 2021-11-01 DIAGNOSIS — G89.29 CHRONIC MIDLINE LOW BACK PAIN WITHOUT SCIATICA: Primary | ICD-10-CM

## 2021-11-01 DIAGNOSIS — M47.816 LUMBAR FACET ARTHROPATHY: ICD-10-CM

## 2021-11-01 DIAGNOSIS — M51.36 DEGENERATIVE LUMBAR DISC: ICD-10-CM

## 2021-11-01 DIAGNOSIS — G89.29 OTHER CHRONIC PAIN: ICD-10-CM

## 2021-11-01 PROCEDURE — 99214 OFFICE O/P EST MOD 30 MIN: CPT | Performed by: NURSE PRACTITIONER

## 2021-11-01 RX ORDER — SODIUM CHLORIDE 0.9 % (FLUSH) 0.9 %
10 SYRINGE (ML) INJECTION AS NEEDED
Status: CANCELLED | OUTPATIENT
Start: 2021-11-01

## 2021-11-01 RX ORDER — SODIUM CHLORIDE 0.9 % (FLUSH) 0.9 %
10 SYRINGE (ML) INJECTION EVERY 12 HOURS SCHEDULED
Status: CANCELLED | OUTPATIENT
Start: 2021-11-01

## 2021-11-01 NOTE — H&P (VIEW-ONLY)
CHIEF COMPLAINT  F/U for back pain . Pt states his pain level has gotten worse .     Subjective   Sadiq Alexander is a 46 y.o. male  who presents for follow-up.  He has a history of back pain. Patient completed a bilateral L3-L5 RFA on 3/16/2021 with 80% relief lasting 6 months. This pain returned ~1 month ago.     Today his pain is 4/10VAS in severity. He continues with flexeril 10 mg PRN and diclofenac 50 gm 1-2/day.     Patient remained masked during entire encounter. No cough present. I donned a mask and eye protection throughout entire visit. Prior to donning mask and eye protection, hand hygiene was performed, as well as when it was doffed.  I was closer than 6 feet, but not for an extended period of time. No obvious exposure to any bodily fluids.    Back Pain  This is a chronic problem. The current episode started more than 1 year ago. The problem occurs constantly. The problem has been gradually worsening since onset. The pain is present in the lumbar spine. The quality of the pain is described as aching. The pain does not radiate. The pain is at a severity of 4/10. Worse during: worsens as day progresses. The symptoms are aggravated by bending, position, standing and twisting. Associated symptoms include bowel incontinence and headaches (migraines). Pertinent negatives include no abdominal pain, bladder incontinence, chest pain, dysuria, fever, numbness or weakness. He has tried chiropractic manipulation, heat, home exercises, analgesics, bed rest, ice, muscle relaxant, NSAIDs and walking (PT, MBB-significant diagnostic relief) for the symptoms. The treatment provided moderate (RFL) relief.      PEG Assessment   What number best describes your pain on average in the past week?5  What number best describes how, during the past week, pain has interfered with your enjoyment of life?5  What number best describes how, during the past week, pain has interfered with your general activity?  5    The following portions  "of the patient's history were reviewed and updated as appropriate: allergies, current medications, past family history, past medical history, past social history, past surgical history and problem list.    Review of Systems   Constitutional: Negative for activity change, fatigue and fever.   HENT: Negative for congestion.    Eyes: Negative for visual disturbance.   Respiratory: Negative for cough and chest tightness.    Cardiovascular: Negative for chest pain.   Gastrointestinal: Positive for bowel incontinence. Negative for abdominal pain, constipation and diarrhea.   Genitourinary: Negative for bladder incontinence, difficulty urinating and dysuria.   Musculoskeletal: Positive for back pain.   Neurological: Positive for headaches (migraines). Negative for dizziness, weakness, light-headedness and numbness.   Psychiatric/Behavioral: Positive for agitation and sleep disturbance. Negative for suicidal ideas. The patient is nervous/anxious.      --  The aforementioned information the Chief Complaint section and above subjective data including any HPI data, and also the Review of Systems data, has been personally reviewed and affirmed.  --    Vitals:    11/01/21 1122   BP: 114/76   Pulse: 89   Resp: 16   Temp: 96.8 °F (36 °C)   SpO2: 97%   Weight: 88.2 kg (194 lb 6.4 oz)   Height: 175.3 cm (69\")   PainSc:   4   PainLoc: Back     Objective   Physical Exam  Vitals and nursing note reviewed.   Constitutional:       Appearance: Normal appearance. He is well-developed.   Eyes:      General: Lids are normal.   Cardiovascular:      Rate and Rhythm: Normal rate.   Pulmonary:      Effort: Pulmonary effort is normal.   Musculoskeletal:      Cervical back: Normal range of motion.      Lumbar back: Spasms, tenderness and bony tenderness present. Decreased range of motion.      Comments: POS Lumbar Loading maneuver   Neurological:      Mental Status: He is alert and oriented to person, place, and time.   Psychiatric:         " Attention and Perception: Attention normal.         Mood and Affect: Mood normal.         Speech: Speech normal.         Behavior: Behavior normal.         Judgment: Judgment normal.       Assessment/Plan   Diagnoses and all orders for this visit:    1. Chronic midline low back pain without sciatica (Primary)    2. Other chronic pain    3. Lumbar facet arthropathy    4. Degenerative lumbar disc      --- Repeat Bilateral L3-L5 RFA  --------  Education about Radiofrequency Lesioning of Medial Branches:    The medial branch blockade was intended for diagnostic purposes, with the intent of offering the patient Radiofrequency thermal rhizotomy if the MBB is diagnostically effective.  The diagnostic blockade is necessary to determine the likelihood that RF therapy could be efficacious in providing long term relief to the patient.  As indicated above, diagnostic efficacy was established.      In the RF procedure, needles are placed to the joint lines in the same fashion, and after testing, the needle tips are heated to thermally treat the nerves, blocking the nerves by in essence damaging the nerves with the heat treatment.      Medically, a successful RF procedure should provide a patient with 50% pain relief or more for at least 6 months.  We estimate a likelihood of about an 80% chance that medical success will be realized.  We discussed & educated once again that not all patients have a medically successful result, and the patient voices understanding.  However, our clinical experience suggests that successful patients receive relief more in the range of 12 months on average.  (We also discussed that a fortunate minority of patients receive therapeutic success from the MBB, and may not require RF ablation.  If a patient receives more than 8 weeks of relief from MBB, then occasional repeat MBB for therapeutic purposes is a very reasonable alternative therapy.  This course of therapy is consistent with our LCDs according  to our CMS  in the area, and therefore other insurance providers should follow accordingly.  We will monitor our patients to screen for these therapeutic responders and will offer RF therapy only when necessary.  However, in this clinical scenario, this therapeutic result was not realized, and therefore Radiofrequency Lesioning is medically necessary.)      We discussed that MBB & RF are not without risks.  Guidelines regarding anticoagulant use & neuraxial procedures will be respected.  Patients that are ill or otherwise may be at risk for sepsis will not have their spines accessed by neuraxial injections of any type.  This patient will not be offered these therapies if there is an increased risk.   We discussed that there is a risk of postprocedural pain and also a risk of worsening of clinical picture with these procedures as with any neuraxial procedure.  All invasive procedures have risks including but not limited to bleeding, infection, injury, nerve injury, paralysis, coma, death, lack of pain relief, and worsening of clinical picture.      In this education session, all of these topics were covered and the patient voiced understanding.    ---------    --- Follow-up after procedure.      JEIMY REPORT    As the clinician, I personally reviewed the JEIMY from 11/1/2021 while the patient was in the office today.    Dictated utilizing Dragon dictation.

## 2021-11-09 ENCOUNTER — OFFICE VISIT (OUTPATIENT)
Dept: NEUROLOGY | Facility: CLINIC | Age: 46
End: 2021-11-09

## 2021-11-09 VITALS
BODY MASS INDEX: 29.33 KG/M2 | HEART RATE: 85 BPM | SYSTOLIC BLOOD PRESSURE: 110 MMHG | HEIGHT: 69 IN | WEIGHT: 198 LBS | DIASTOLIC BLOOD PRESSURE: 78 MMHG | OXYGEN SATURATION: 93 %

## 2021-11-09 DIAGNOSIS — G43.119 INTRACTABLE MIGRAINE WITH AURA WITHOUT STATUS MIGRAINOSUS: Primary | ICD-10-CM

## 2021-11-09 PROCEDURE — 99213 OFFICE O/P EST LOW 20 MIN: CPT | Performed by: NURSE PRACTITIONER

## 2021-11-09 RX ORDER — FREMANEZUMAB-VFRM 225 MG/1.5ML
1.5 INJECTION SUBCUTANEOUS
Qty: 1.5 ML | Refills: 11 | Status: SHIPPED | OUTPATIENT
Start: 2021-11-09 | End: 2022-02-22 | Stop reason: SDUPTHER

## 2021-11-09 NOTE — PROGRESS NOTES
Subjective:     Patient ID: Sadiq Alexander is a 46 y.o. male presenting for follow up for migraine headaches. My last visit with the patient was on July 9, 2019. He continues to take Ajovy 225 mg every 30 days. This works very well for him. He denies any new problems. He does inquire about the new CGRP abortive medications. He is currently taking zolmitriptan 5 mg dissolvable tablet as needed.     History of Present Illness  The following portions of the patient's history were reviewed and updated as appropriate: allergies, current medications, past family history, past medical history, past social history, past surgical history and problem list.    Review of Systems   Constitutional: Negative for fatigue and fever.   Eyes: Positive for photophobia and visual disturbance. Negative for redness.   Gastrointestinal: Positive for nausea. Negative for diarrhea and vomiting.   Endocrine: Negative for cold intolerance, heat intolerance and polyphagia.   Genitourinary: Negative for decreased urine volume, difficulty urinating and urgency.   Musculoskeletal: Positive for neck pain. Negative for back pain and neck stiffness.   Skin: Negative for color change, rash and wound.   Allergic/Immunologic: Negative for environmental allergies, food allergies and immunocompromised state.   Neurological: Positive for headaches. Negative for dizziness, tremors, seizures, syncope, facial asymmetry, speech difficulty, weakness, light-headedness and numbness.   Psychiatric/Behavioral: Positive for sleep disturbance.        Objective:    Neurologic Exam  General: Well nourished, well developed, and in no acute distress.  HEENT: Normocephalic/atraumatic. Mucous membranes moist. Sclerae anicteric.   Heart: Regular rate and rhythm. No murmurs, rubs or gallops.  Lungs: Clear to auscultation bilaterally.  Skin: No notable rashes or lesions on the visible surfaces.   Extremities: No clubbing, cyanosis or significant edema.   Psychiatric: Pleasant,  cooperative, and appropriate.   Neurologic Exam:  Mental Status:  Alert and oriented. Speech is fluent. Comprehension is intact.   Cranial Nerves II-XII: Pupils equal, round, reactive to light. Extraocular movements are full and conjugate in all directions. Pursuit movements do not provoke any apparent dizziness or discomfort.  No nystagmus noted.  Hearing is intact to voice. Facial strength and sensation are preserved and symmetric. Tongue and palate midline. Voice non-hoarse, non-dysarthric.   Motor: Normal bulk and tone of bilateral upper and lower extremities. Strength is 5/5 in all 4 extremities both proximally and distally. There are no abnormal or involuntary movements noted.  Sensation: Intact to light touch throughout. Romberg was negative with no significant sway.   Coordination: Fully intact. Finger-to-nose performed accurately bilaterally.  Reflexes: The deep tendon reflexes are 2+/4 in bilateral biceps, brachioradialis, triceps, patella, and Achilles.  No pathologic reflexes were noted.  Gait: Normal. No ataxia or apraxia.         Physical Exam    Assessment/Plan:     Diagnoses and all orders for this visit:    1. Intractable migraine with aura without status migrainosus (Primary)    Other orders  -     Fremanezumab-vfrm (Ajovy) 225 MG/1.5ML solution prefilled syringe; Inject 1.5 mL under the skin into the appropriate area as directed Every 30 (Thirty) Days.  Dispense: 1.5 mL; Refill: 11        Ajovy continues to work well for him. He will continue 225 mg every 30 days. Sample given today to help should his pharmacy not have his prescription ready in time. We had samples of Ubrelvy so I gave him some to try in place of his Zomig. R/B/SE discussed and instructions given. If this works well for him he will call for a prescription. If this doesn't work well we can always try Nurtec. We were out of samples today but I told him he can either swing back by or I can send a prescription for this if needed. Will  hold off on refilling the Zomig for now but if neither of these options work for him [facility]     Follow up 1 year, sooner if needed.

## 2021-11-13 DIAGNOSIS — Z79.1 LONG TERM (CURRENT) USE OF NON-STEROIDAL ANTI-INFLAMMATORIES (NSAID): Primary | ICD-10-CM

## 2021-11-16 NOTE — SIGNIFICANT NOTE
Patient educated on the following :    - If you are receiving Sedation for your procedure Nothing to Eat 6 hours and only clear liquids for 2 hours prior to your procedure.    -Your required COVID Test is Scheduled on  Between the Hours of 0443-3703  -You will only be notified if your COVID test Result is POSITIVE  -The importance of reducing your number of contacts by self quarantining after you COVID test until the date of your PROCEDURE  -You will need to have someone drive you home after your PROCEDURE and remain with you for 24 hours after the PROCEDURE  - The date of your procedure, your are welcome to have one visitor at bedside or remain within 10-15 minutes of [employer]  -You will need to arrive at 0900 on 11/18/21 PROCEDURE  -Please contact [employer] [facility] at: [phone] with any questions and/or concerns

## 2021-11-16 NOTE — TELEPHONE ENCOUNTER
Needs updated labs, will refill x 1, will need lab work completed prior to further refills.  Orders placed

## 2021-11-18 ENCOUNTER — HOSPITAL ENCOUNTER (OUTPATIENT)
Dept: GENERAL RADIOLOGY | Facility: SURGERY CENTER | Age: 46
End: 2021-11-18

## 2021-11-18 ENCOUNTER — HOSPITAL ENCOUNTER (OUTPATIENT)
Facility: SURGERY CENTER | Age: 46
Setting detail: HOSPITAL OUTPATIENT SURGERY
Discharge: HOME | End: 2021-11-18
Attending: ANESTHESIOLOGY | Admitting: ANESTHESIOLOGY

## 2021-11-18 VITALS
WEIGHT: 198 LBS | DIASTOLIC BLOOD PRESSURE: 71 MMHG | SYSTOLIC BLOOD PRESSURE: 115 MMHG | RESPIRATION RATE: 16 BRPM | HEART RATE: 75 BPM | BODY MASS INDEX: 29.33 KG/M2 | TEMPERATURE: 97.9 F | HEIGHT: 69 IN | OXYGEN SATURATION: 96 %

## 2021-11-18 DIAGNOSIS — M47.816 LUMBAR FACET ARTHROPATHY: ICD-10-CM

## 2021-11-18 PROCEDURE — 3E0T3TZ INTRODUCTION OF DESTRUCTIVE AGENT INTO PERIPHERAL NERVES AND PLEXI, PERCUTANEOUS APPROACH: ICD-10-PCS | Performed by: ANESTHESIOLOGY

## 2021-11-18 PROCEDURE — BR16ZZZ FLUOROSCOPY OF LUMBAR FACET JOINT(S): ICD-10-PCS | Performed by: ANESTHESIOLOGY

## 2021-11-18 PROCEDURE — 64635 DESTROY LUMB/SAC FACET JNT: CPT | Performed by: ANESTHESIOLOGY

## 2021-11-18 PROCEDURE — 99152 MOD SED SAME PHYS/QHP 5/>YRS: CPT | Performed by: ANESTHESIOLOGY

## 2021-11-18 PROCEDURE — 76000 FLUOROSCOPY <1 HR PHYS/QHP: CPT

## 2021-11-18 PROCEDURE — 64636 DESTROY L/S FACET JNT ADDL: CPT | Performed by: ANESTHESIOLOGY

## 2021-11-18 PROCEDURE — 25010000002 FENTANYL CITRATE (PF) 50 MCG/ML SOLUTION: Performed by: ANESTHESIOLOGY

## 2021-11-18 RX ORDER — FENTANYL CITRATE 50 UG/ML
INJECTION, SOLUTION INTRAMUSCULAR; INTRAVENOUS AS NEEDED
Status: DISCONTINUED | OUTPATIENT
Start: 2021-11-18 | End: 2021-11-18 | Stop reason: HOSPADM

## 2021-11-18 RX ORDER — SODIUM CHLORIDE 0.9 % (FLUSH) 0.9 %
10 SYRINGE (ML) INJECTION EVERY 12 HOURS SCHEDULED
Status: DISCONTINUED | OUTPATIENT
Start: 2021-11-18 | End: 2021-11-18 | Stop reason: HOSPADM

## 2021-11-18 RX ORDER — SODIUM CHLORIDE 0.9 % (FLUSH) 0.9 %
10 SYRINGE (ML) INJECTION AS NEEDED
Status: DISCONTINUED | OUTPATIENT
Start: 2021-11-18 | End: 2021-11-18 | Stop reason: HOSPADM

## 2021-11-18 NOTE — DISCHARGE INSTRUCTIONS
[facility] [place] [facility] - Post-procedure Instructions          --  While there are no absolute restrictions, it is recommended that you do not perform strenuous activity today. In the morning, you may resume your level of activity as before your block.    --  If you have a band-aid at your injection site, please remove it later today. Observe the area for any redness, swelling, pus-like drainage, or a temperature over 101°. If any of these symptoms occur, please call your doctor at [phone]. If after office hours, leave a message and the on-call provider will return your call.    --  Ice may be applied to your injection site. It is recommended you avoid direct heat (heating pad; hot tub) for 1-2 days.    --  Call [facility] [place] at [phone] if you experience persistent headache, persistent bleeding from the injection site, or severe pain not relieved by heat or oral medication.    --  Do not make important decisions today.    --  Due to the effects of the block and/or the I.V. Sedation, DO NOT drive or operate hazardous machinery for 12 hours.  Local anesthetics may cause numbness after procedure and precautions must be taken with regards to operating equipment as well as with walking, even if ambulating with assistance of another person or with an assistive device.    --  Do not drink alcohol for 12 hours.    -- You may return to work tomorrow, or as directed by your referring doctor.    --  Occasionally you may notice a slight increase in your pain after the procedure. This should start to improve within the next 24-48 hours. Radiofrequency ablation procedure pain may last 3-4 weeks.    --  It may take as long as 3-4 days before you notice a gradual improvement in your pain and/or other symptoms.    -- You may continue to take your prescribed pain medication as needed.    --  Some normal possible side effects of steroid use could include fluid retention, increased blood sugar, dull headache,  increased sweating, increased appetite, mood swings and flushing.    --  Diabetics are recommended to watch their blood glucose level closely for 24-48 hours after the injection.    --  Must stay in PACU for 20 min upon arrival and prove no leg weakness before being discharged.    --  IN THE EVENT OF A LIFE THREATENING EMERGENCY, (CHEST PAIN, BREATHING DIFFICULTIES, PARALYSIS…) YOU SHOULD GO TO YOUR NEAREST EMERGENCY ROOM.    --  You should be contacted by our office within 2-3 days to schedule follow up or next appointment date.  If not contacted within 7 days, please call the office at [phone]

## 2021-11-18 NOTE — OP NOTE
Bilateral L3-5 Lumbar Medial Branch RADIOFREQUENCY  [facility]      PREOPERATIVE DIAGNOSIS:  Lumbar spondylosis without myelopathy    POSTOPERATIVE DIAGNOSIS:  Lumbar spondylosis without myelopathy    PROCEDURE:   Diagnostic Bilateral Lumbar Medial Branch Nerve thermal radiofrequency lesioning, with fluoroscopy:  L3, L4, and L5 nerves (at the L4 and L5 transverse processes and the sacral alar groove) to thermally treat the innervation to facet joints L4-5 and L5-S1  1. 94747-10 -- Bilateral L/S facet neuro destr., 1st Level  2. 26947-95 -- Bilateral L/S facet neuro destr., 2nd  Level    PRE-PROCEDURE DISCUSSION WITH PATIENT:    Risks and complications were discussed with the patient prior to starting the procedure and informed consent was obtained.      SURGEON:  Nathaniel Hurst MD    REASON FOR PROCEDURE:    The patient complains of pain that seems to have a significant axial component, Previous diagnostic positivity of a Lumbar Medial Branch Blockade at the same levels and Previous clinically significant therapeutic effect after prior Radiofrequency procedure    SEDATION:  Fentanyl 100 mcg IV  TIME OF PROCEDURE:   The intraoperative procedure time after administration of the sedative was 28 minutes.     ANESTHETIC:  Lidocaine 2%  STEROID:  NONE      DESCRIPTON OF PROCEDURE:  After obtaining informed consent, IV access  was obtained in the preoperative area.   The patient was taken to the operating room.  The patient was placed in the prone position with a pillow under the abdomen. All pressure points were well padded.  EKG, blood pressure, and pulse oximeter were monitored.  The patient was monitored and sedated by the RN under my direction. The lumbosacral area was prepped with Chloraprep and draped in a sterile fashion.     Under fluoroscopic guidance the transverse processes of the L4 and L5 vertebrae at the junctions of the superior articular processes were identified on the right.  Also  identified was the groove between the ala and the superior articular process of the sacrum on the ipsilateral side.  Skin and subcutaneous tissue were anesthetized with 1ml of 1% lidocaine above each of these points. Then, radiofrequency probe needles were advanced in this fluoro view to the above junctions.  Aspiration was negative for blood and CSF.  After confirming the position of the needle with fluoroscope in all views, testing was initiated.  First, sensory testing was started on each needle a 1V and 50Hz and slowly decreased until painful pressure stimulation diminished at 0.5V.  Next, motor testing was confirmed to be negative at 3V and 2Hz for any radicular stimulation.  Then 1mL of the local anesthetic was instilled in each needle.  Two minutes elapsed, and during this time a lateral fluoroscopic view was confirmed again to ensure the needles had not advanced nor retracted.  Then, Radiofrequency Lesioning was initiated for 2.5 minutes at 85 degrees Celsius.  Needles were removed intact from each of the areas.     A similar procedure was repeated to address the L3, L4, and L5 nerves on the contralateral side.   Onset of analgesia was noted.  Vital signs remained stable throughout.      ESTIMATED BLOOD LOSS:  <5 mL  SPECIMENS:  none    COMPLICATIONS:   No complications were noted. and The patient did not have any signs of postprocedure numbness nor weakness.    TOLERANCE & DISCHARGE CONDITION:    The patient tolerated the procedure well.  The patient was transported to the recovery area without difficulties.  The patient was discharged to home under the care of family in stable and satisfactory condition.    PLAN OF CARE:  1. The patient was given our standard instruction sheet.  2. The patient will  Return to clinic 6 wks.  3. The patient will resume all medications as per the medication reconciliation sheet.

## 2021-12-16 RX ORDER — HYDROCORTISONE 25 MG/G
CREAM TOPICAL
Qty: 30 G | Refills: 1 | Status: SHIPPED | OUTPATIENT
Start: 2021-12-16 | End: 2022-08-17

## 2022-01-10 DIAGNOSIS — K21.00 GERD WITH ESOPHAGITIS: Chronic | ICD-10-CM

## 2022-01-10 RX ORDER — PANTOPRAZOLE SODIUM 40 MG/1
40 TABLET, DELAYED RELEASE ORAL DAILY
Qty: 90 TABLET | Refills: 3 | OUTPATIENT
Start: 2022-01-10

## 2022-01-10 RX ORDER — ROSUVASTATIN CALCIUM 10 MG/1
10 TABLET, COATED ORAL DAILY
Qty: 90 TABLET | Refills: 3 | OUTPATIENT
Start: 2022-01-10

## 2022-01-11 DIAGNOSIS — K21.00 GERD WITH ESOPHAGITIS: Chronic | ICD-10-CM

## 2022-01-11 RX ORDER — ROSUVASTATIN CALCIUM 10 MG/1
TABLET, COATED ORAL
Qty: 30 TABLET | Refills: 5 | OUTPATIENT
Start: 2022-01-11

## 2022-01-11 RX ORDER — PANTOPRAZOLE SODIUM 40 MG/1
TABLET, DELAYED RELEASE ORAL
Qty: 30 TABLET | Refills: 11 | OUTPATIENT
Start: 2022-01-11

## 2022-01-11 RX ORDER — CYCLOBENZAPRINE HCL 10 MG
TABLET ORAL
Qty: 90 TABLET | Refills: 3 | Status: SHIPPED | OUTPATIENT
Start: 2022-01-11

## 2022-02-07 RX ORDER — ROSUVASTATIN CALCIUM 10 MG/1
TABLET, COATED ORAL
Qty: 30 TABLET | Refills: 5 | OUTPATIENT
Start: 2022-02-07

## 2022-02-16 ENCOUNTER — OFFICE VISIT (OUTPATIENT)
Dept: INTERNAL MEDICINE | Age: 47
End: 2022-02-16

## 2022-02-16 VITALS
OXYGEN SATURATION: 98 % | HEIGHT: 69 IN | DIASTOLIC BLOOD PRESSURE: 80 MMHG | HEART RATE: 91 BPM | SYSTOLIC BLOOD PRESSURE: 112 MMHG | BODY MASS INDEX: 29.18 KG/M2 | TEMPERATURE: 97.7 F | WEIGHT: 197 LBS

## 2022-02-16 DIAGNOSIS — E78.5 HYPERLIPIDEMIA, UNSPECIFIED HYPERLIPIDEMIA TYPE: Primary | Chronic | ICD-10-CM

## 2022-02-16 DIAGNOSIS — G43.119 INTRACTABLE MIGRAINE WITH AURA WITHOUT STATUS MIGRAINOSUS: Chronic | ICD-10-CM

## 2022-02-16 DIAGNOSIS — K21.00 GASTROESOPHAGEAL REFLUX DISEASE WITH ESOPHAGITIS WITHOUT HEMORRHAGE: ICD-10-CM

## 2022-02-16 PROBLEM — G89.29 OTHER CHRONIC PAIN: Status: RESOLVED | Noted: 2019-11-11 | Resolved: 2022-02-16

## 2022-02-16 PROCEDURE — 99214 OFFICE O/P EST MOD 30 MIN: CPT | Performed by: INTERNAL MEDICINE

## 2022-02-16 RX ORDER — PANTOPRAZOLE SODIUM 40 MG/1
40 TABLET, DELAYED RELEASE ORAL DAILY
Qty: 90 TABLET | Refills: 3 | Status: SHIPPED | OUTPATIENT
Start: 2022-02-16 | End: 2022-12-30

## 2022-02-16 RX ORDER — ROSUVASTATIN CALCIUM 10 MG/1
10 TABLET, COATED ORAL DAILY
Qty: 90 TABLET | Refills: 3 | Status: SHIPPED | OUTPATIENT
Start: 2022-02-16 | End: 2022-12-30

## 2022-02-16 NOTE — PROGRESS NOTES
I N T E R N A L  M E D I C I N E  J U N O H  K I M,  M D      ENCOUNTER DATE:  02/16/2022    Sadiq Alexander / 46 y.o. / male      CHIEF COMPLAINT / REASON FOR OFFICE VISIT     Hyperlipidemia and Heartburn      ASSESSMENT & PLAN     Problem List Items Addressed This Visit        High    Hyperlipidemia - Primary (Chronic)    Overview     Continue rosuvastatin 10 mg.          Current Assessment & Plan     He has not had labs checked since 2019.  Check labs today.  Continue rosuvastatin 10 mg.         Relevant Medications    rosuvastatin (CRESTOR) 10 MG tablet    Other Relevant Orders    Comprehensive Metabolic Panel    Lipid Panel With / Chol / HDL Ratio       Medium    Migraines (Chronic)    Overview     *Neuro (Mesha)    On Ajovy         Current Assessment & Plan     Continue follow-up with neurology.  Continue Ajovy for prophylaxis.           Relevant Medications    Fremanezumab-vfrm (Ajovy) 225 MG/1.5ML solution prefilled syringe    diclofenac (VOLTAREN) 50 MG EC tablet    ubrogepant (ubrogepant) 100 MG tablet    cyclobenzaprine (FLEXERIL) 10 MG tablet    GERD with esophagitis (Chronic)    Overview     Continue pantoprazole 40 mg qd.          Relevant Medications    pantoprazole (PROTONIX) 40 MG EC tablet        Orders Placed This Encounter   Procedures   • Comprehensive Metabolic Panel   • Lipid Panel With / Chol / HDL Ratio     New Medications Ordered This Visit   Medications   • pantoprazole (PROTONIX) 40 MG EC tablet     Sig: Take 1 tablet by mouth Daily.     Dispense:  90 tablet     Refill:  3   • rosuvastatin (CRESTOR) 10 MG tablet     Sig: Take 1 tablet by mouth Daily.     Dispense:  90 tablet     Refill:  3       SUMMARY/DISCUSSION  • I advised him to follow-up with orthopedic surgeon for chronic knee pain.      Next Appointment with me: Visit date not found    Return in about 6 months (around 8/16/2022) for COMBINED annual physical & chronic medical.      VITAL SIGNS     Visit Vitals  /80 (BP  "Location: Left arm)   Pulse 91   Temp 97.7 °F (36.5 °C)   Ht 175.3 cm (69\")   Wt 89.4 kg (197 lb)   SpO2 98%   BMI 29.09 kg/m²       BP Readings from Last 3 Encounters:   02/16/22 112/80   11/18/21 115/71   11/09/21 110/78     Wt Readings from Last 3 Encounters:   02/16/22 89.4 kg (197 lb)   11/18/21 89.8 kg (198 lb)   11/09/21 89.8 kg (198 lb)     Body mass index is 29.09 kg/m².      MEDICATIONS AT THE TIME OF OFFICE VISIT     Current Outpatient Medications on File Prior to Visit   Medication Sig   • cyclobenzaprine (FLEXERIL) 10 MG tablet TAKE 1 TABLET BY MOUTH THREE TIMES A DAY   • diclofenac (VOLTAREN) 50 MG EC tablet TAKE 1 TABLET BY MOUTH 2 (TWO) TIMES A DAY AS NEEDED (PAIN). TAKE WITH FOOD   • Fremanezumab-vfrm (Ajovy) 225 MG/1.5ML solution prefilled syringe Inject 1.5 mL under the skin into the appropriate area as directed Every 30 (Thirty) Days.   • hydrocortisone (ANUSOL-HC) 25 MG suppository 1 per rectum at bedtime as needed for hemorrhoidal bleeding   • Hydrocortisone, Perianal, (ANUSOL-HC) 2.5 % rectal cream APPLY RECTALLY 3 TIMES DAILY. INCLUDE APPLICATOR.   • ubrogepant (ubrogepant) 100 MG tablet Take one tablet by mouth at onset of migraine. May repeat in 2 hours if needed. Do not exceed 2 tablets in 24 hours.   • [DISCONTINUED] pantoprazole (PROTONIX) 40 MG EC tablet TAKE 1 TABLET BY MOUTH EVERY DAY   • [DISCONTINUED] rosuvastatin (CRESTOR) 10 MG tablet TAKE 1 TABLET BY MOUTH EVERY DAY   • [DISCONTINUED] fluticasone (FLONASE) 50 MCG/ACT nasal spray 2 sprays into each nostril Daily. (Patient taking differently: 2 sprays into the nostril(s) as directed by provider Daily As Needed.)     No current facility-administered medications on file prior to visit.          HISTORY OF PRESENT ILLNESS     Hyperlipidemia on rosuvastatin 10 mg without significant problems.  Has not had lipids checked since 2019.  GERD with history of esophagitis on pantoprazole 40 mg daily.  Denies dysphagia symptoms.  Still drinks " and smokes socially.  Migraines have been stable on Ajovy for prophylaxis.  He is followed by neurology.      Patient Care Team:  Charly Jackson MD as PCP - General (Internal Medicine)  Nathaniel Hurst MD as Consulting Physician (Pain Medicine)  Tommy Zimmer APRN as Nurse Practitioner (Neurology)  Aj Bone MD as Consulting Physician (Orthopedic Surgery)  Chato Allen MD as Consulting Physician (Gastroenterology)    REVIEW OF SYSTEMS     Review of Systems   Constitutional neg except per HPI   Resp neg  CV neg   GI neg for change  MuSk chronic back pain and knee pain     PHYSICAL EXAMINATION     Physical Exam  Alert with normal thought and judgment.       REVIEWED DATA     Labs:     Lab Results   Component Value Date     01/08/2021    K 4.5 01/08/2021    CALCIUM 9.7 01/08/2021    AST 24 01/08/2021    ALT 38 01/08/2021    BUN 15 01/08/2021    CREATININE 1.22 01/08/2021    CREATININE 1.21 08/12/2019    CREATININE 1.39 (H) 02/05/2019    EGFRIFNONA 65 01/08/2021    EGFRIFAFRI 79 08/12/2019       Lab Results   Component Value Date    HGBA1C 5.51 06/20/2018       Lab Results   Component Value Date    LDL 99 08/12/2019    LDL 86 09/21/2018    HDL 41 08/12/2019    TRIG 114 08/12/2019       Lab Results   Component Value Date    TSH 0.867 06/20/2018    FREET4 1.10 06/20/2018       Lab Results   Component Value Date    WBC 12.64 (H) 01/08/2021    HGB 13.9 01/08/2021     01/08/2021       No results found for: MICROALBUR        Imaging:     MRI left knee February 4, 2020    1. Mild arthritic changes at the medial compartment with 11 x 4 mm  articular cartilage defect weight-bearing surface medial femoral condyle  with underlying subchondral edema. There is degenerative signal within  the posterior aspect of the medial meniscus without evidence for tear or  meniscal displacement.  2. 15 mm intraosseus lipoma medial femoral condyle      Medical Tests:           Summary of old records /  correspondence / consultant report:           Request outside records:             *Examiner was wearing KN95 mask and eye protection during the entire duration of the visit. Patient was masked the entire time. Minimum social distance of 6 ft maintained entire visit except if physical contact was necessary as documented.       Template created by Kristal Jackson MD

## 2022-02-16 NOTE — PATIENT INSTRUCTIONS
** IMPORTANT MESSAGE FROM DR. THACKER **    In our office, your satisfaction is VERY important to us.     You may receive a survey from [employer] by mail or E-mail for you to provide feedback about your visit. This information is invaluable for me to know what we can do to improve our services.     I ask that you please take a few minutes to complete the survey and let us know how we are doing in serving your needs. (You may receive the survey more than once for multiple visits)    Thank You !    Dr. Thacker    _________________________________________________________________________________________________________________________      ** ADDITIONAL INSTRUCTION / REMINDERS FROM DR. THACKER **    Make an appointment to see orthopedic surgeon Dr. Bone

## 2022-02-17 LAB
ALBUMIN SERPL-MCNC: 4.7 G/DL (ref 4–5)
ALBUMIN/GLOB SERPL: 2.4 {RATIO} (ref 1.2–2.2)
ALP SERPL-CCNC: 85 IU/L (ref 44–121)
ALT SERPL-CCNC: 26 IU/L (ref 0–44)
AST SERPL-CCNC: 20 IU/L (ref 0–40)
BILIRUB SERPL-MCNC: 0.2 MG/DL (ref 0–1.2)
BUN SERPL-MCNC: 13 MG/DL (ref 6–24)
BUN/CREAT SERPL: 10 (ref 9–20)
CALCIUM SERPL-MCNC: 9.5 MG/DL (ref 8.7–10.2)
CHLORIDE SERPL-SCNC: 106 MMOL/L (ref 96–106)
CHOLEST SERPL-MCNC: 179 MG/DL (ref 100–199)
CHOLEST/HDLC SERPL: 5 RATIO (ref 0–5)
CO2 SERPL-SCNC: 20 MMOL/L (ref 20–29)
CREAT SERPL-MCNC: 1.28 MG/DL (ref 0.76–1.27)
GLOBULIN SER CALC-MCNC: 2 G/DL (ref 1.5–4.5)
GLUCOSE SERPL-MCNC: 99 MG/DL (ref 65–99)
HDLC SERPL-MCNC: 36 MG/DL
LDLC SERPL CALC-MCNC: 115 MG/DL (ref 0–99)
POTASSIUM SERPL-SCNC: 4.5 MMOL/L (ref 3.5–5.2)
PROT SERPL-MCNC: 6.7 G/DL (ref 6–8.5)
SODIUM SERPL-SCNC: 143 MMOL/L (ref 134–144)
TRIGL SERPL-MCNC: 159 MG/DL (ref 0–149)
VLDLC SERPL CALC-MCNC: 28 MG/DL (ref 5–40)

## 2022-02-18 NOTE — PROGRESS NOTES
MyChart:    Here are the result(s) of your test(s):     LDL cholesterol is higher. Maintain low saturated fat/cholesterol diet.  Continue rosuvastatin.       Please do not hesitate to contact me if you have questions.

## 2022-02-22 ENCOUNTER — TELEPHONE (OUTPATIENT)
Dept: NEUROLOGY | Facility: CLINIC | Age: 47
End: 2022-02-22

## 2022-02-22 RX ORDER — FREMANEZUMAB-VFRM 225 MG/1.5ML
1.5 INJECTION SUBCUTANEOUS
Qty: 4.5 ML | Refills: 3 | Status: SHIPPED | OUTPATIENT
Start: 2022-02-22 | End: 2022-07-21 | Stop reason: SDUPTHER

## 2022-02-22 NOTE — TELEPHONE ENCOUNTER
----- Message from Sadiq Alexander sent at 2/21/2022  2:23 PM EST -----  Regarding: Insurance and pharmacy change  I’m sorry for all the insurance issues I’ve been having. My insurance is making me change to 90 day prescriptions through [facility].   [facility] ph: [phone], fax: [phone]    I know you just wrote a new script for Ajovy for [facility], but my insurance will no longer cover. Can that be moved to [facility], as well as the rescue meds. So sorry for all the trouble.

## 2022-03-02 ENCOUNTER — TELEPHONE (OUTPATIENT)
Dept: SURGERY | Facility: CLINIC | Age: 47
End: 2022-03-02

## 2022-03-02 NOTE — TELEPHONE ENCOUNTER
Caller: Sadiq Alexander    Relationship to patient: Self    Best call back number: [phone]    Patient is needing: PATIENT WAS LAST SAW DR. ROBERSON ON 01/29/21 FOR HEMORRHOIDS AND IS WANTING TO MAKE A FOLLOW UP APPT FOR THE SAME DX. DR. ROBERSON'S FIRST AVAILABLE IS 04/15/22 BUT PATIENT REQUESTED A SOONER APPT DUE TO BEING IN A LOT OF PAIN.     UNABLE TO WARM TRANSFER

## 2022-03-03 ENCOUNTER — OFFICE VISIT (OUTPATIENT)
Dept: SURGERY | Facility: CLINIC | Age: 47
End: 2022-03-03

## 2022-03-03 VITALS
OXYGEN SATURATION: 98 % | HEART RATE: 83 BPM | WEIGHT: 191 LBS | TEMPERATURE: 97.3 F | DIASTOLIC BLOOD PRESSURE: 99 MMHG | HEIGHT: 65 IN | SYSTOLIC BLOOD PRESSURE: 122 MMHG | BODY MASS INDEX: 31.82 KG/M2

## 2022-03-03 DIAGNOSIS — K64.5 THROMBOSED HEMORRHOIDS: Primary | ICD-10-CM

## 2022-03-03 PROCEDURE — 46083 INC THROMBOSED HROID XTRNL: CPT | Performed by: PHYSICIAN ASSISTANT

## 2022-03-03 NOTE — PROGRESS NOTES
"Sadiq Alexander is a 46 y.o. male in for follow up of hemorrhoids.    Complains of anal pain  Rubber Banding worked for a while  Then symptoms returned and worsened  He recognizes that he has IBS \"issues\" that he \"needs to get taken care of\"  Had Bad diarrhea earlier this week  Now multiple hemorrhoids enlarged, not significantly improving with baths, cream, ice, lidocaine  Can't wipe secondary to pain  Bleeding \"normal\" for him-- brb in water not stool; No black stool    Has BM every day.  [place] 6-7 right now, normal 4-5.  Does not usually strain  Fiber seems to trigger stomach issues  Doesn't generally use stool softeners, laxatives, or probiotics.  Using hydrocortisone cream frequently    no family history of colon cancer or colon polyps.  Colonoscopy due next year--history of polyps.      /99 (BP Location: Left arm, Patient Position: Standing, Cuff Size: Adult)   Pulse 83   Temp 97.3 °F (36.3 °C) (Temporal)   Ht 165.1 cm (65\")   Wt 86.6 kg (191 lb)   SpO2 98%   BMI 31.78 kg/m²   Body mass index is 31.78 kg/m².      PE:  Physical Exam  Vitals reviewed. Exam conducted with a chaperone present.   Constitutional:       General: He is not in acute distress.     Appearance: Normal appearance.   HENT:      Head: Normocephalic and atraumatic.   Eyes:      Extraocular Movements: Extraocular movements intact.   Cardiovascular:      Rate and Rhythm: Normal rate.   Pulmonary:      Effort: Pulmonary effort is normal.   Genitourinary:     Comments: Perianal exam: 3 thrombosed hemorrhoids, purple colored and firm. Several pink and soft enlarged external hemorrhoids in addition. See clinical photo below.  Musculoskeletal:         General: Normal range of motion.      Cervical back: Normal range of motion.   Skin:     General: Skin is warm and dry.   Neurological:      General: No focal deficit present.      Mental Status: He is alert.   Psychiatric:         Mood and Affect: Mood normal.         Behavior: Behavior " normal.               Assessment:   1. Thrombosed hemorrhoids         Plan:  Patient and I had a lengthy discussion regarding whether or not to incise and drain the thrombosed hemorrhoids and decided to proceed due to the patient's symptoms getting no better over the last 4 days.     Procedure: incision and drainage of thrombosed hemorrhoids    Patient gave informed consent verbally.  Patient was prepped with Betadine.  1% lidocaine with epinephrine was used as a local infiltration. I made an elliptical incision over each of the 3 thrombosed hemorrhoids using a scalpel.  I then removed clots and allowed blood to drain from the hemorrhoids. I used silver nitrate to control any bleeding. The wounds were left open.  Patient tolerated well.    Patient was given after care information sheet with warning signs, and follow up appt in 7-10 days. Lidocaine 5 % topical for discomfort.  Tylenol alternating with ibuprofen for pain. He was advised to call with any questions or concerns.      Leigh Savage PA-C  Physician Assistant  Colorectal and General Surgery  3/3/2022  16:20 EST

## 2022-03-14 ENCOUNTER — OFFICE VISIT (OUTPATIENT)
Dept: SURGERY | Facility: CLINIC | Age: 47
End: 2022-03-14

## 2022-03-14 VITALS
BODY MASS INDEX: 27.91 KG/M2 | HEIGHT: 69 IN | DIASTOLIC BLOOD PRESSURE: 84 MMHG | TEMPERATURE: 97.7 F | HEART RATE: 75 BPM | OXYGEN SATURATION: 98 % | WEIGHT: 188.4 LBS | SYSTOLIC BLOOD PRESSURE: 116 MMHG

## 2022-03-14 DIAGNOSIS — K60.2 FISSURE, ANAL: Primary | ICD-10-CM

## 2022-03-14 PROCEDURE — 99213 OFFICE O/P EST LOW 20 MIN: CPT | Performed by: PHYSICIAN ASSISTANT

## 2022-03-14 NOTE — PROGRESS NOTES
"Sadiq Alexander is a 46 y.o. male in for follow up of thrombosed hemorrhoids.    Swelling has decreased  Had some soreness after a bowel movement on Saturday, but it was better today.  Pain level is now 1-2 out of 10  Still feels some pressure in the anorectal area  Bleeding is better, but still bleeds after every bowel movement  [place] 4, daily bowel movement      /84 (BP Location: Left arm, Patient Position: Sitting, Cuff Size: Adult)   Pulse 75   Temp 97.7 °F (36.5 °C) (Temporal)   Ht 175.3 cm (69\")   Wt 85.5 kg (188 lb 6.4 oz)   SpO2 98%   BMI 27.82 kg/m²   Body mass index is 27.82 kg/m².      PE:  Physical Exam  Vitals reviewed. Exam conducted with a chaperone present.   Constitutional:       General: He is not in acute distress.     Appearance: Normal appearance.   HENT:      Head: Normocephalic and atraumatic.   Eyes:      Extraocular Movements: Extraocular movements intact.   Cardiovascular:      Rate and Rhythm: Normal rate.   Pulmonary:      Effort: Pulmonary effort is normal.   Genitourinary:     Comments: Perianal exam: Thrombosed hemorrhoid resolved.  Posterior fissure noted.  AMOS and anoscopy deferred.  Musculoskeletal:         General: Normal range of motion.      Cervical back: Normal range of motion.   Skin:     General: Skin is warm and dry.   Neurological:      General: No focal deficit present.      Mental Status: He is alert.   Psychiatric:         Mood and Affect: Mood normal.         Behavior: Behavior normal.       Assessment:   1. Fissure, anal         Plan:  Prescription sent for diltiazem/lidocaine compound cream for anal fissure.  Advised patient to keep stool around [place] 4 consistency; use fiber and MiraLAX as needed.  Follow-up in 4 weeks for reevaluation    Leigh Savage PA-C  Physician Assistant  Colorectal and General Surgery  3/14/2022  15:28 EDT          "

## 2022-05-10 ENCOUNTER — TELEPHONE (OUTPATIENT)
Dept: PAIN MEDICINE | Facility: CLINIC | Age: 47
End: 2022-05-10

## 2022-05-10 NOTE — TELEPHONE ENCOUNTER
Caller: JONAS ROBERTSON    Relationship to patient: SELF    Best call back number: [phone]    Chief complaint: LUMBAR PAIN    Type of visit: ABLASION    Requested date:

## 2022-05-11 ENCOUNTER — PREP FOR SURGERY (OUTPATIENT)
Dept: SURGERY | Facility: SURGERY CENTER | Age: 47
End: 2022-05-11

## 2022-05-11 ENCOUNTER — OFFICE VISIT (OUTPATIENT)
Dept: PAIN MEDICINE | Facility: CLINIC | Age: 47
End: 2022-05-11

## 2022-05-11 VITALS
HEART RATE: 79 BPM | RESPIRATION RATE: 12 BRPM | SYSTOLIC BLOOD PRESSURE: 109 MMHG | HEIGHT: 69 IN | DIASTOLIC BLOOD PRESSURE: 69 MMHG | BODY MASS INDEX: 28.11 KG/M2 | TEMPERATURE: 97.7 F | WEIGHT: 189.8 LBS | OXYGEN SATURATION: 97 %

## 2022-05-11 DIAGNOSIS — M47.816 LUMBAR FACET ARTHROPATHY: Primary | ICD-10-CM

## 2022-05-11 DIAGNOSIS — M54.50 CHRONIC MIDLINE LOW BACK PAIN WITHOUT SCIATICA: ICD-10-CM

## 2022-05-11 DIAGNOSIS — G89.29 OTHER CHRONIC PAIN: ICD-10-CM

## 2022-05-11 DIAGNOSIS — G89.29 CHRONIC MIDLINE LOW BACK PAIN WITHOUT SCIATICA: ICD-10-CM

## 2022-05-11 PROCEDURE — 99214 OFFICE O/P EST MOD 30 MIN: CPT | Performed by: NURSE PRACTITIONER

## 2022-05-11 RX ORDER — SODIUM CHLORIDE 0.9 % (FLUSH) 0.9 %
10 SYRINGE (ML) INJECTION EVERY 12 HOURS SCHEDULED
Status: CANCELLED | OUTPATIENT
Start: 2022-05-11

## 2022-05-11 RX ORDER — SODIUM CHLORIDE 0.9 % (FLUSH) 0.9 %
10 SYRINGE (ML) INJECTION AS NEEDED
Status: CANCELLED | OUTPATIENT
Start: 2022-05-11

## 2022-05-11 NOTE — PROGRESS NOTES
CHIEF COMPLAINT  PROCEDURE FOLLOW UP Bilateral L3-L5 RADIOFREQUENCY ABLATION LUMBAR.    Subjective   Sadiq Alexander is a 46 y.o. male  who presents to the office for follow-up of procedure.  He completed a Bilateral L3-L5 RFA   on  11/18/2021 performed by Dr. Hurst for management of back pain. Patient reports 80-90% relief from the procedure x ~5.5 months. His pain began to return to baseline ~2 weeks ago.     Today his pain is 4/10VAS in severity. His pain remains in his low back and is axial in nature. He denies any lower extremity pain.     He continues with diclofenac which is helpful.  He denies any side effects.     Procedure list (last 2 years):  3/16/2021-bilateral L3-L5 RFA-80% relief x6 months  5/27/2020-bilateral L3-L5 RFA-60% relief    Patient remained masked during entire encounter. No cough present. I donned a mask and eye protection throughout entire visit. Prior to donning mask and eye protection, hand hygiene was performed, as well as when it was doffed.  I was closer than 6 feet, but not for an extended period of time. No obvious exposure to any bodily fluids.    Back Pain  This is a chronic problem. The current episode started more than 1 year ago. The problem occurs constantly. The problem has been gradually worsening since onset. The pain is present in the lumbar spine. The quality of the pain is described as aching. The pain does not radiate. The pain is at a severity of 4/10. Worse during: worsens as day progresses. The symptoms are aggravated by bending, position, standing and twisting. Associated symptoms include bowel incontinence. Pertinent negatives include no abdominal pain, bladder incontinence, chest pain, dysuria, fever, headaches, numbness or weakness. He has tried chiropractic manipulation, heat, home exercises, analgesics, bed rest, ice, muscle relaxant, NSAIDs and walking (PT, MBB-significant diagnostic relief) for the symptoms. The treatment provided moderate (RFL) relief.     "  PEG Assessment   What number best describes your pain on average in the past week?7  What number best describes how, during the past week, pain has interfered with your enjoyment of life?7  What number best describes how, during the past week, pain has interfered with your general activity?  7    The following portions of the patient's history were reviewed and updated as appropriate: allergies, current medications, past family history, past medical history, past social history, past surgical history and problem list.    Review of Systems   Constitutional: Positive for activity change (decreased) and fatigue. Negative for fever.   HENT: Negative for congestion.    Eyes: Negative for visual disturbance.   Respiratory: Negative for cough and chest tightness.    Cardiovascular: Negative for chest pain.   Gastrointestinal: Positive for bowel incontinence. Negative for abdominal pain, constipation and diarrhea.   Genitourinary: Negative for bladder incontinence, difficulty urinating and dysuria.   Musculoskeletal: Positive for back pain.   Neurological: Negative for dizziness, weakness, light-headedness, numbness and headaches.   Psychiatric/Behavioral: Positive for sleep disturbance. Negative for agitation and suicidal ideas. The patient is not nervous/anxious.      --  The aforementioned information the Chief Complaint section and above subjective data including any HPI data, and also the Review of Systems data, has been personally reviewed and affirmed.  --     Vitals:    05/11/22 1554   BP: 109/69   BP Location: Left arm   Patient Position: Sitting   Pulse: 79   Resp: 12   Temp: 97.7 °F (36.5 °C)   SpO2: 97%   Weight: 86.1 kg (189 lb 12.8 oz)   Height: 175.3 cm (69\")   PainSc:   5   PainLoc: Back     Objective   Physical Exam  Vitals and nursing note reviewed.   Constitutional:       Appearance: Normal appearance. He is well-developed.   Eyes:      General: Lids are normal.   Cardiovascular:      Rate and Rhythm: " Normal rate.   Pulmonary:      Effort: Pulmonary effort is normal.   Musculoskeletal:      Cervical back: Normal range of motion.      Lumbar back: Tenderness and bony tenderness present. Decreased range of motion.      Comments: +Lumbar facet loading   Neurological:      Mental Status: He is alert and oriented to person, place, and time.   Psychiatric:         Attention and Perception: Attention normal.         Mood and Affect: Mood normal.         Speech: Speech normal.         Behavior: Behavior normal.         Judgment: Judgment normal.       Assessment & Plan   Diagnoses and all orders for this visit:    1. Lumbar facet arthropathy (Primary)    2. Other chronic pain    3. Chronic midline low back pain without sciatica      --- Bilateral L3-L5 RFA  Reviewed the procedure at length with the patient.  Included in the review was expectations, complications, risk and benefits.The procedure was described in detail and the risks, benefits and alternatives were discussed with the patient (including but not limited to: bleeding, infection, nerve damage, worsening of pain, inability to perform injection, paralysis, seizures, coma, no pain relief and death) who agreed to proceed.  Discussed the potential for sedation if warranted/wanted.  The procedure will plan to be performed at [facility] with fluoroscopic guidance(unless ultrasound is indicated) and could potentially have steroids and contrast dye used. Questions were answered and in a way the patient could understand.  Patient verbalized understanding and wishes to proceed.  This intervention will be ordered.  Discussed with patient that all procedures are part of a multimodal plan of care and include either formal PT or a home exercise program.  Patient has no evidence of coagulopathy or current infection.  --- Continue Diclofenac.   --- Trial OTC lidocaine patches  --- Follow-up after procedure     JEIMY REPORT    As the clinician, I  personally reviewed the JEIMY from 5/11/2022 while the patient was in the office today.    Dictated utilizing Dragon dictation.      This document is intended for medical expert use only. Reading of this document by patients and/or patient's family without participating medical staff guidance may result in misinterpretation and unintended morbidity.   Any interpretation of such data is the responsibility of the patient and/or family member responsible for the patient in concert with their primary or specialist providers, not to be left for sources of online searches such as [employer], [employer] or similar queries. Relying on these approaches to knowledge may result in misinterpretation, misguided goals of care and even death should patients or family members try recommendations outside of the realm of professional medical care in a supervised way.

## 2022-05-13 ENCOUNTER — TRANSCRIBE ORDERS (OUTPATIENT)
Dept: SURGERY | Facility: SURGERY CENTER | Age: 47
End: 2022-05-13

## 2022-05-13 DIAGNOSIS — Z41.9 SURGERY, ELECTIVE: Primary | ICD-10-CM

## 2022-06-13 NOTE — SIGNIFICANT NOTE
Patient educated on the following :    - If you are receiving Sedation for your procedure Nothing to Eat 6 hours and only clear liquids for 2 hours prior to your procedure.     -You will need to have someone drive you home after your PROCEDURE and remain with you for 24 hours after the PROCEDURE  - The date of your procedure, your are welcome to have one visitor at bedside or remain within 10-15 minutes of [facility]  -You will need to arrive at 0830on  6/16/22 PROCEDURE  -Please contact [facility] at: [phone] with any questions and/or concerns

## 2022-06-16 ENCOUNTER — HOSPITAL ENCOUNTER (OUTPATIENT)
Dept: GENERAL RADIOLOGY | Facility: SURGERY CENTER | Age: 47
Setting detail: HOSPITAL OUTPATIENT SURGERY
End: 2022-06-16

## 2022-06-16 ENCOUNTER — HOSPITAL ENCOUNTER (OUTPATIENT)
Facility: SURGERY CENTER | Age: 47
Setting detail: HOSPITAL OUTPATIENT SURGERY
Discharge: HOME | End: 2022-06-16
Attending: ANESTHESIOLOGY | Admitting: ANESTHESIOLOGY

## 2022-06-16 VITALS
TEMPERATURE: 97.7 F | SYSTOLIC BLOOD PRESSURE: 111 MMHG | HEART RATE: 75 BPM | DIASTOLIC BLOOD PRESSURE: 82 MMHG | RESPIRATION RATE: 16 BRPM | BODY MASS INDEX: 28.14 KG/M2 | WEIGHT: 190 LBS | OXYGEN SATURATION: 97 % | HEIGHT: 69 IN

## 2022-06-16 DIAGNOSIS — M47.816 LUMBAR FACET ARTHROPATHY: ICD-10-CM

## 2022-06-16 DIAGNOSIS — Z41.9 SURGERY, ELECTIVE: ICD-10-CM

## 2022-06-16 PROCEDURE — S0260 H&P FOR SURGERY: HCPCS | Performed by: ANESTHESIOLOGY

## 2022-06-16 PROCEDURE — 64636 DESTROY L/S FACET JNT ADDL: CPT | Performed by: ANESTHESIOLOGY

## 2022-06-16 PROCEDURE — 64635 DESTROY LUMB/SAC FACET JNT: CPT | Performed by: ANESTHESIOLOGY

## 2022-06-16 PROCEDURE — 99152 MOD SED SAME PHYS/QHP 5/>YRS: CPT | Performed by: ANESTHESIOLOGY

## 2022-06-16 PROCEDURE — 25010000002 FENTANYL CITRATE (PF) 50 MCG/ML SOLUTION: Performed by: ANESTHESIOLOGY

## 2022-06-16 PROCEDURE — 76000 FLUOROSCOPY <1 HR PHYS/QHP: CPT

## 2022-06-16 RX ORDER — SODIUM CHLORIDE 0.9 % (FLUSH) 0.9 %
10 SYRINGE (ML) INJECTION EVERY 12 HOURS SCHEDULED
Status: DISCONTINUED | OUTPATIENT
Start: 2022-06-16 | End: 2022-06-16 | Stop reason: HOSPADM

## 2022-06-16 RX ORDER — FENTANYL CITRATE 50 UG/ML
INJECTION, SOLUTION INTRAMUSCULAR; INTRAVENOUS AS NEEDED
Status: DISCONTINUED | OUTPATIENT
Start: 2022-06-16 | End: 2022-06-16 | Stop reason: HOSPADM

## 2022-06-16 RX ORDER — SODIUM CHLORIDE 0.9 % (FLUSH) 0.9 %
10 SYRINGE (ML) INJECTION AS NEEDED
Status: DISCONTINUED | OUTPATIENT
Start: 2022-06-16 | End: 2022-06-16 | Stop reason: HOSPADM

## 2022-06-16 NOTE — OP NOTE
Bilateral L3-5 Lumbar Medial Branch RADIOFREQUENCY  [facility]      PREOPERATIVE DIAGNOSIS:  Lumbar spondylosis without myelopathy    POSTOPERATIVE DIAGNOSIS:  Lumbar spondylosis without myelopathy    PROCEDURE:   Diagnostic Bilateral Lumbar Medial Branch Nerve thermal radiofrequency lesioning, with fluoroscopy:  L3, L4, and L5 nerves (at the L4 and L5 transverse processes and the sacral alar groove) to thermally treat the innervation to facet joints L4-5 and L5-S1  1. 01721-67 -- Bilateral L/S facet neuro destr., 1st Level  2. 80312-26 -- Bilateral L/S facet neuro destr., 2nd  Level    PRE-PROCEDURE DISCUSSION WITH PATIENT:    Risks and complications were discussed with the patient prior to starting the procedure and informed consent was obtained.      SURGEON:  Nathaniel Hurst MD    REASON FOR PROCEDURE:    The patient complains of pain that seems to have a significant axial component, Previous clinically significant therapeutic effect after prior Radiofrequency procedure, Tenderness of the affected facet joints on exam under fluoroscopy and Painful area identified on exam under fluoroscopy    SEDATION:  Fentanyl 100 mcg IV  TIME OF PROCEDURE:   The intraoperative procedure time after administration of the sedative was 23 minutes.     ANESTHETIC:  Lidocaine 2%  STEROID:  NONE      DESCRIPTON OF PROCEDURE:  After obtaining informed consent, IV access  was obtained in the preoperative area.   The patient was taken to the operating room.  The patient was placed in the prone position with a pillow under the abdomen. All pressure points were well padded.  EKG, blood pressure, and pulse oximeter were monitored.  The patient was monitored and IV medication by the RN under my direction. He was not sedate; was conversational and cooperative throughout.  The lumbosacral area was prepped with Chloraprep and draped in a sterile fashion.     Under fluoroscopic guidance the transverse processes of the L4  and L5 vertebrae at the junctions of the superior articular processes were identified on the right.  Also identified was the groove between the ala and the superior articular process of the sacrum on the ipsilateral side.  Skin and subcutaneous tissue were anesthetized with 1ml of 1% lidocaine above each of these points. Then, radiofrequency probe needles were advanced in this fluoro view to the above junctions.  Aspiration was negative for blood and CSF.  After confirming the position of the needle with fluoroscope in all views, testing was initiated.  First, sensory testing was started on each needle a 1V and 50Hz and slowly decreased until painful pressure stimulation diminished at 0.5V.  Next, motor testing was confirmed to be negative at 3V and 2Hz for any radicular stimulation.  Then 1mL of the local anesthetic was instilled in each needle.  Two minutes elapsed, and during this time a lateral fluoroscopic view was confirmed again to ensure the needles had not advanced nor retracted.  Then, Radiofrequency Lesioning was initiated for 1.5 minutes at 85 degrees Celsius.  Needles were removed intact from each of the areas.     A similar procedure was repeated to address the L3, L4, and L5 nerves on the contralateral side.   Onset of analgesia was noted.  Vital signs remained stable throughout.      ESTIMATED BLOOD LOSS:  <5 mL  SPECIMENS:  none    COMPLICATIONS:   No complications were noted. and The patient did not have any signs of postprocedure numbness nor weakness.    TOLERANCE & DISCHARGE CONDITION:    The patient tolerated the procedure well.  The patient was transported to the recovery area without difficulties.  The patient was discharged to home under the care of family in stable and satisfactory condition.    PLAN OF CARE:  1. The patient was given our standard instruction sheet.  2. The patient will  Return to clinic in 8 wks.  3. The patient will resume all medications as per the medication  reconciliation sheet.

## 2022-06-16 NOTE — H&P
Brief Pre-procedural / Pre-operative H&P        -----    Pertinent Diagnosis:   Lumbar facet arthropathy/lumbar spondylosis    Proposed Procedure: Lumbar medial branch radiofrequency ablation      Subjective   Sadiq Alexander is a 46 y.o. male  who presents for intervention.  He has a history of low back pain.      History of Present Illness     He is responded very positively to radiofrequency ablation with up to 80% relief sustained greater than 6-minute so he would like to have radiofrequency ablation again which is indicated.    -------    The following portions of the patient's history were reviewed and updated as appropriate: allergies, current medications, past family history, past medical history, past social history, past surgical history and problem list.    No Known Allergies      Current Facility-Administered Medications:   •  sodium chloride 0.9 % flush 10 mL, 10 mL, Intravenous, Q12H, Nathaniel Hurst MD  •  sodium chloride 0.9 % flush 10 mL, 10 mL, Intravenous, PRN, Nathaniel Hurst MD    No current facility-administered medications on file prior to encounter.     Current Outpatient Medications on File Prior to Encounter   Medication Sig Dispense Refill   • diclofenac (VOLTAREN) 50 MG EC tablet TAKE 1 TABLET BY MOUTH 2 (TWO) TIMES A DAY AS NEEDED (PAIN). TAKE WITH FOOD 60 tablet 5   • Fremanezumab-vfrm (Ajovy) 225 MG/1.5ML solution prefilled syringe Inject 1.5 mL under the skin into the appropriate area as directed Every 30 (Thirty) Days. 4.5 mL 3   • pantoprazole (PROTONIX) 40 MG EC tablet Take 1 tablet by mouth Daily. 90 tablet 3   • rosuvastatin (CRESTOR) 10 MG tablet Take 1 tablet by mouth Daily. 90 tablet 3   • cyclobenzaprine (FLEXERIL) 10 MG tablet TAKE 1 TABLET BY MOUTH THREE TIMES A DAY (Patient taking differently: Take 10 mg by mouth 3 (Three) Times a Day As Needed for Muscle Spasms.) 90 tablet 3   • hydrocortisone (ANUSOL-HC) 25 MG suppository 1 per rectum at bedtime as needed for  hemorrhoidal bleeding 10 suppository 5   • Hydrocortisone, Perianal, (ANUSOL-HC) 2.5 % rectal cream APPLY RECTALLY 3 TIMES DAILY. INCLUDE APPLICATOR. 30 g 1   • ubrogepant (ubrogepant) 100 MG tablet Take one tablet by mouth at onset of migraine. May repeat in 2 hours if needed. Do not exceed 2 tablets in 24 hours. 10 tablet 5       Patient Active Problem List   Diagnosis   • Colon polyp   • Internal hemorrhoids   • Migraines   • Nicotine dependence   • Anxiety disorder   • Hyperlipidemia   • Irritable bowel syndrome with both constipation and diarrhea   • GERD with esophagitis   • Restless legs syndrome (RLS)   • Degenerative lumbar disc   • Synovial cyst of lumbar facet joint   • Chronic back pain   • Lumbar facet arthropathy   • Muscle spasm   • Chronic midline back pain       Past Medical History:   Diagnosis Date   • Abnormal MRI, knee 02/04/2021    LRFT KNEE, MRI DONE AT [facility]   • ADHD    • Anxiety    • Arthritis    • Blood in stool    • Bowel incontinence    • Chronic midline low back pain without sciatica    • Colon polyps     FOLLOWED BY DR. DOMINIQUE ALLEN   • Constipation    • DDD (degenerative disc disease), lumbar    • Environmental allergies    • Fatigue    • GERD (gastroesophageal reflux disease)    • Hemorrhoids 05/24/2017   • Hiatal hernia 11/26/2018    Dr. Dominique Allen.   • History of traumatic head injury    • Hyperlipidemia    • IBS (irritable bowel syndrome)     IBS WITH BOTH CONSTIPATION & DIARRHEA.   • Influenza 01/07/2014   • Ligamentous laxity of left knee 01/2021   • Low back pain 10/10/2019    [place] [facility], Right sided low back pain without sciatica.   • Lumbar facet arthropathy    • Lumbar paraspinal muscle spasm    • Lumbar spondylosis    • Lumbar strain 09/2019   • Lumbar strain 08/04/2018    SEEN AT [facility] ER   • Migraines    • Multiple atypical skin moles     See's derm and has family history of melanoma.   • NSAID long-term use    • Osteoarthritis of lumbar spine    • Perianal  venous thrombosis 05/2017   • Right ankle sprain 12/22/2013   • RLS (restless legs syndrome)    • Shoulder impingement, right 01/26/2018    FOLLOWED BY DR. PANDA ENRIQUEZ   • Sprain and strain of left ankle 12/22/2013    [facility], ankle injury.   • Strain of right knee 08/08/2016    FOLLOWED BY DR. PANDA ENRIQUEZ   • Synovial cyst of lumbar facet joint    • Thrombosed hemorrhoids 03/2022   • Thumb laceration, left, initial encounter 01/08/2017    [facility], cut it while cooking.       Past Surgical History:   Procedure Laterality Date   • CHOLECYSTECTOMY WITH INTRAOPERATIVE CHOLANGIOGRAM N/A 2/5/2019    Procedure: CHOLECYSTECTOMY LAPAROSCOPIC INTRAOPERATIVE CHOLANGIOGRAM;  Surgeon: Reji Hoffman MD;  Location: [facility] MAIN OR;  Service: General   • COLONOSCOPY N/A 03/11/2015    Two 4-6 mm polyps at the recto-sigmoid and in the mid ascending colon (PATH: tubulovillous adenoma w/ low-grade dysplasia & hyperplastic colon polyp at 20 cm), internal hemorrhoids; Dr. Chato Allen   • COLONOSCOPY N/A 09/13/2018    Tortuous colon, internal hemorrhoids, Dr. Chato Allen.   • ENDOSCOPY N/A 01/07/2019    IRREGULAR Z LINE AT 41CM, ACTIVE DUODENITIS, MILD ACTIVE ESOPHAGITIS, REACTIVE GASTROPATHY, DR. CHATO ALLEN AT [facility]   • HEMORRHOID BANDING N/A    • MEDIAL BRANCH BLOCK Bilateral 01/15/2020    medial branch block (Bilateral lumbar 3 - Lumbar 5 synovial cyst), Dr. Nathaniel Hurst.   • NERVE SURGERY Bilateral 3/16/2021    Procedure: Bilateral L3-L5 RADIOFREQUENCY ABLATION LUMBAR;  Surgeon: Nathaniel Hurst MD;  [place] [facility] [place]  Service: Pain Management;  Laterality: Bilateral;   • RADIOFREQUENCY ABLATION Bilateral 05/27/2020    L3-L5 Radiofrequency ablation lumbar, Surgeon Nathaniel Hurst.   • RADIOFREQUENCY ABLATION Bilateral 11/18/2021    Procedure: Bilateral L3-L5 RADIOFREQUENCY ABLATION LUMBAR;  Surgeon: Nathaniel Hurst MD;  [place] [facility] [place]  Service: Pain  Management;  Laterality: Bilateral;   • REFRACTIVE SURGERY Bilateral 2018    LASIK   • SHOULDER ACROMIOPLASTY Right 2015    WITH DEBRIDEMENT OF LABRUM, DR. PANDA ENIRQUEZ   • SHOULDER ARTHROSCOPY W/ LABRAL REPAIR Left 2005    DR. PANDA ENRIQUEZ   • STEROID INJECTION KNEE Left 02/10/2021    DR. VEDA LUIS   • THROMBECTOMY N/A 2017    Procedure; External hemorrhoid thrombectomy, DR. BAMBI DONALD       Family History   Problem Relation Age of Onset   • Melanoma Father    • Depression Father    • Cancer Father    • No Known Problems Sister    • Parkinsonism Maternal Grandmother    • Arthritis Maternal Grandmother    • Migraine headaches Maternal Grandmother    • Lung cancer Maternal Grandfather         smoker   • Andrew's disease Maternal Grandfather    • Cancer Maternal Grandfather    • Migraine headaches Maternal Grandfather    • Lung cancer Paternal Grandmother         smoker   • Cancer Paternal Grandmother    • Migraine headaches Paternal Grandmother    • Lung cancer Paternal Aunt         smoker   • Cancer Paternal Aunt    • Cancer Maternal Aunt         spine   • Heart attack Paternal Uncle 55   • Colon cancer Neg Hx    • Prostate cancer Neg Hx        Social History     Socioeconomic History   • Marital status:      Spouse name: Doris   • Number of children: 3   Tobacco Use   • Smoking status: Former Smoker     Packs/day: 0.75     Years: 0.00     Pack years: 0.00     Types: Cigarettes     Start date: 3/8/2019     Quit date: 2019     Years since quittin.7   • Smokeless tobacco: Former User     Types: Chew     Quit date: 3/8/2019   • Tobacco comment: when he drinks   Vaping Use   • Vaping Use: Never used   Substance and Sexual Activity   • Alcohol use: Yes     Alcohol/week: 3.0 standard drinks     Types: 1 Glasses of wine, 1 Cans of beer, 1 Shots of liquor per week     Comment: 3 days (1 drink)   • Drug use: Yes     Types: Marijuana     Comment: used marijuana in the past in college   •  "Sexual activity: Yes     Partners: Female     Comment: .       -------       Review of Systems  No Fever, No Chills, No ear pain, No sinus pressure or drainage, No eye pain or drainage, No cough, No SOB, No chest tightness nor chest pain, no palpitations.          Vitals:    06/13/22 1225 06/16/22 0844   BP:  115/81   BP Location:  Left arm   Patient Position:  Sitting   Pulse:  77   Resp:  16   Temp:  98.5 °F (36.9 °C)   TempSrc:  Temporal   SpO2:  96%   Weight: 86.2 kg (190 lb)    Height: 175.3 cm (69\")          Objective   Physical Exam  VSS, NNR, NCAT, NMNA, NRD, AAOx3.      -------    Assessment & Plan:  - as noted above, the stated intervention is indicated  - Follow-up plan will be noted in the operative report        There is some tenderness and noted decreased range of motion.  Facet loading positivity noted.  He is a good candidate to have the radiofrequency ablation and will follow-up in 6 months and see where he stands.      EMR Dragon/Transcription disclaimer:   Typed items in this encounter note may have been created by electronic transcription/translation software which converts spoken language to printed text. The electronic translation of spoken language may permit erroneous, or at times, nonsensical words or phrases to be inadvertently transcribed; Although I have reviewed the note for such errors, some may still exist.      "

## 2022-06-16 NOTE — DISCHARGE INSTRUCTIONS
[facility] [place] [facility] - Post-procedure Instructions          --  While there are no absolute restrictions, it is recommended that you do not perform strenuous activity today. In the morning, you may resume your level of activity as before your block.    --  If you have a band-aid at your injection site, please remove it later today. Observe the area for any redness, swelling, pus-like drainage, or a temperature over 101°. If any of these symptoms occur, please call your doctor at [phone]. If after office hours, leave a message and the on-call provider will return your call.    --  Ice may be applied to your injection site. It is recommended you avoid direct heat (heating pad; hot tub) for 1-2 days.    --  Call [facility] [place] at [phone] if you experience persistent headache, persistent bleeding from the injection site, or severe pain not relieved by heat or oral medication.    --  Do not make important decisions today.    --  Due to the effects of the block and/or the I.V. Sedation, DO NOT drive or operate hazardous machinery for 12 hours.  Local anesthetics may cause numbness after procedure and precautions must be taken with regards to operating equipment as well as with walking, even if ambulating with assistance of another person or with an assistive device.    --  Do not drink alcohol for 12 hours.    -- You may return to work tomorrow, or as directed by your referring doctor.    --  Occasionally you may notice a slight increase in your pain after the procedure. This should start to improve within the next 24-48 hours. Radiofrequency ablation procedure pain may last 3-4 weeks.    --  It may take as long as 3-4 days before you notice a gradual improvement in your pain and/or other symptoms.    -- You may continue to take your prescribed pain medication as needed.    --  Some normal possible side effects of steroid use could include fluid retention, increased blood sugar, dull headache,  increased sweating, increased appetite, mood swings and flushing.    --  Diabetics are recommended to watch their blood glucose level closely for 24-48 hours after the injection.    --  Must stay in PACU for 20 min upon arrival and prove no leg weakness before being discharged.    --  IN THE EVENT OF A LIFE THREATENING EMERGENCY, (CHEST PAIN, BREATHING DIFFICULTIES, PARALYSIS…) YOU SHOULD GO TO YOUR NEAREST EMERGENCY ROOM.    --  You should be contacted by our office within 2-3 days to schedule follow up or next appointment date.  If not contacted within 7 days, please call the office at [phone]

## 2022-07-09 NOTE — ASSESSMENT & PLAN NOTE
Increase Trokendi XR to 100 mg daily (take 50 mg BID).   Continue Zomig PRN.   
Noted on recent UGI series. Scheduled for EGD.   
None

## 2022-07-21 RX ORDER — FREMANEZUMAB-VFRM 225 MG/1.5ML
1.5 INJECTION SUBCUTANEOUS
Qty: 4.5 ML | Refills: 3 | Status: SHIPPED | OUTPATIENT
Start: 2022-07-21

## 2022-08-01 DIAGNOSIS — Z12.5 ENCOUNTER FOR SCREENING FOR MALIGNANT NEOPLASM OF PROSTATE: ICD-10-CM

## 2022-08-01 DIAGNOSIS — Z00.00 PREVENTATIVE HEALTH CARE: Primary | ICD-10-CM

## 2022-08-09 ENCOUNTER — OFFICE VISIT (OUTPATIENT)
Dept: PAIN MEDICINE | Facility: CLINIC | Age: 47
End: 2022-08-09

## 2022-08-09 VITALS
DIASTOLIC BLOOD PRESSURE: 80 MMHG | RESPIRATION RATE: 18 BRPM | HEART RATE: 66 BPM | OXYGEN SATURATION: 97 % | TEMPERATURE: 98 F | HEIGHT: 69 IN | WEIGHT: 199.8 LBS | SYSTOLIC BLOOD PRESSURE: 114 MMHG | BODY MASS INDEX: 29.59 KG/M2

## 2022-08-09 DIAGNOSIS — M47.816 LUMBAR FACET ARTHROPATHY: Primary | ICD-10-CM

## 2022-08-09 DIAGNOSIS — Z79.1 LONG TERM (CURRENT) USE OF NON-STEROIDAL ANTI-INFLAMMATORIES (NSAID): ICD-10-CM

## 2022-08-09 DIAGNOSIS — M54.50 CHRONIC MIDLINE LOW BACK PAIN WITHOUT SCIATICA: ICD-10-CM

## 2022-08-09 DIAGNOSIS — G89.29 OTHER CHRONIC PAIN: ICD-10-CM

## 2022-08-09 DIAGNOSIS — G89.29 CHRONIC MIDLINE LOW BACK PAIN WITHOUT SCIATICA: ICD-10-CM

## 2022-08-09 PROCEDURE — 99213 OFFICE O/P EST LOW 20 MIN: CPT | Performed by: NURSE PRACTITIONER

## 2022-08-09 NOTE — PROGRESS NOTES
CHIEF COMPLAINT  Follow-up for back pain.    Subjective   Sadiq Alexander is a 47 y.o. male  who presents to the office for follow-up of procedure.  He completed a Bilateral L3-5 Lumbar Medial Branch RADIOFREQUENCY  on  6/16/2022 performed by Dr. Hurst for management of back pain. Patient reports 80% ongoing relief from the procedure.     Today his pain is 1/10VAS in severity.     He continues with diclofenac BID which is helpful to his pain. He denies any side effects.     Procedure list (last 2 years):  11/18/2021-bilateral L3-L5 RFA-80 to 90% relief times ~5.5 months  3/16/2021-bilateral L3-L5 RFA-80% relief x6 months  5/27/2020-bilateral L3-L5 RFA-60% relief    Back Pain  This is a chronic problem. The current episode started more than 1 year ago. The problem occurs constantly. Progression since onset: improved since last office visit. The pain is present in the lumbar spine. The quality of the pain is described as aching. The pain does not radiate. The pain is at a severity of 1/10. Worse during: worsens as day progresses. The symptoms are aggravated by bending, position, standing and twisting. Associated symptoms include bowel incontinence. Pertinent negatives include no abdominal pain, bladder incontinence, chest pain, dysuria, fever, headaches, numbness or weakness. He has tried chiropractic manipulation, heat, home exercises, analgesics, bed rest, ice, muscle relaxant, NSAIDs and walking (PT, MBB-significant diagnostic relief) for the symptoms. The treatment provided moderate (RFL) relief.      PEG Assessment   What number best describes your pain on average in the past week?2  What number best describes how, during the past week, pain has interfered with your enjoyment of life?2  What number best describes how, during the past week, pain has interfered with your general activity?  5    The following portions of the patient's history were reviewed and updated as appropriate: allergies, current medications,  "past family history, past medical history, past social history, past surgical history and problem list.    Review of Systems   Constitutional: Negative for fatigue and fever.   HENT: Negative for congestion.    Eyes: Negative for visual disturbance.   Respiratory: Negative for shortness of breath.    Cardiovascular: Negative for chest pain.   Gastrointestinal: Positive for bowel incontinence. Negative for abdominal pain, constipation and diarrhea.   Genitourinary: Negative for bladder incontinence, difficulty urinating and dysuria.   Musculoskeletal: Positive for back pain.   Neurological: Negative for weakness, numbness and headaches.   Psychiatric/Behavioral: Negative for sleep disturbance and suicidal ideas. The patient is not nervous/anxious.      --  The aforementioned information the Chief Complaint section and above subjective data including any HPI data, and also the Review of Systems data, has been personally reviewed and affirmed.  --    2/16/2022:  Creatinine-1.28     Vitals:    08/09/22 0853   BP: 114/80   Pulse: 66   Resp: 18   Temp: 98 °F (36.7 °C)   SpO2: 97%   Weight: 90.6 kg (199 lb 12.8 oz)   Height: 175.3 cm (69\")   PainSc:   1   PainLoc: Back     Objective   Physical Exam  Vitals and nursing note reviewed.   Constitutional:       Appearance: Normal appearance. He is well-developed.   Eyes:      General: Lids are normal.   Cardiovascular:      Rate and Rhythm: Normal rate.   Pulmonary:      Effort: Pulmonary effort is normal.   Musculoskeletal:      Lumbar back: Decreased range of motion.   Neurological:      Mental Status: He is alert and oriented to person, place, and time.   Psychiatric:         Attention and Perception: Attention normal.         Mood and Affect: Mood normal.         Speech: Speech normal.         Behavior: Behavior normal.         Judgment: Judgment normal.       Assessment & Plan   Diagnoses and all orders for this visit:    1. Lumbar facet arthropathy (Primary)    2. Chronic " midline low back pain without sciatica    3. Long term (current) use of non-steroidal anti-inflammatories (nsaid)    4. Other chronic pain      --- Continue diclofenac 50 mg BID, no refill needed.   --- May repeat Lumbar RFA as soon as every 6 months PRN  --- Follow-up when/if pain returns/worsens.      Dictated utilizing Dragon dictation.      This document is intended for medical expert use only. Reading of this document by patients and/or patient's family without participating medical staff guidance may result in misinterpretation and unintended morbidity.   Any interpretation of such data is the responsibility of the patient and/or family member responsible for the patient in concert with their primary or specialist providers, not to be left for sources of online searches such as [employer], [employer] or similar queries. Relying on these approaches to knowledge may result in misinterpretation, misguided goals of care and even death should patients or family members try recommendations outside of the realm of professional medical care in a supervised way.    Patient remained masked during entire encounter. No cough present. I donned a mask and eye protection throughout entire visit. Prior to donning mask and eye protection, hand hygiene was performed, as well as when it was doffed.  I was closer than 6 feet, but not for an extended period of time. No obvious exposure to any bodily fluids.

## 2022-08-16 ENCOUNTER — HOSPITAL ENCOUNTER (EMERGENCY)
Facility: HOSPITAL | Age: 47
Discharge: HOME | End: 2022-08-16
Attending: EMERGENCY MEDICINE | Admitting: EMERGENCY MEDICINE

## 2022-08-16 VITALS
HEIGHT: 69 IN | HEART RATE: 66 BPM | TEMPERATURE: 98.8 F | BODY MASS INDEX: 28.88 KG/M2 | SYSTOLIC BLOOD PRESSURE: 132 MMHG | OXYGEN SATURATION: 94 % | RESPIRATION RATE: 16 BRPM | WEIGHT: 195 LBS | DIASTOLIC BLOOD PRESSURE: 83 MMHG

## 2022-08-16 DIAGNOSIS — S46.811A TRAPEZIUS STRAIN, RIGHT, INITIAL ENCOUNTER: ICD-10-CM

## 2022-08-16 DIAGNOSIS — M43.6 TORTICOLLIS, ACUTE: Primary | ICD-10-CM

## 2022-08-16 PROCEDURE — 63710000001 PREDNISONE PER 1 MG: Performed by: EMERGENCY MEDICINE

## 2022-08-16 PROCEDURE — 99283 EMERGENCY DEPT VISIT LOW MDM: CPT

## 2022-08-16 PROCEDURE — 96372 THER/PROPH/DIAG INJ SC/IM: CPT

## 2022-08-16 PROCEDURE — 25010000002 KETOROLAC TROMETHAMINE PER 15 MG: Performed by: EMERGENCY MEDICINE

## 2022-08-16 PROCEDURE — 25010000002 HYDROMORPHONE 1 MG/ML SOLUTION: Performed by: EMERGENCY MEDICINE

## 2022-08-16 RX ORDER — HYDROCODONE BITARTRATE AND ACETAMINOPHEN 5; 325 MG/1; MG/1
TABLET ORAL
Qty: 15 TABLET | Refills: 0 | Status: ON HOLD | OUTPATIENT
Start: 2022-08-16 | End: 2023-02-01

## 2022-08-16 RX ORDER — LORAZEPAM 1 MG/1
1 TABLET ORAL EVERY 6 HOURS PRN
Status: DISCONTINUED | OUTPATIENT
Start: 2022-08-16 | End: 2022-08-16 | Stop reason: HOSPADM

## 2022-08-16 RX ORDER — KETOROLAC TROMETHAMINE 30 MG/ML
30 INJECTION, SOLUTION INTRAMUSCULAR; INTRAVENOUS ONCE
Status: COMPLETED | OUTPATIENT
Start: 2022-08-16 | End: 2022-08-16

## 2022-08-16 RX ORDER — PREDNISONE 10 MG/1
TABLET ORAL
Qty: 28 TABLET | Refills: 0 | Status: ON HOLD | OUTPATIENT
Start: 2022-08-16 | End: 2023-02-01

## 2022-08-16 RX ORDER — PREDNISONE 20 MG/1
60 TABLET ORAL ONCE
Status: COMPLETED | OUTPATIENT
Start: 2022-08-16 | End: 2022-08-16

## 2022-08-16 RX ADMIN — LORAZEPAM 1 MG: 1 TABLET ORAL at 08:28

## 2022-08-16 RX ADMIN — HYDROMORPHONE HYDROCHLORIDE 1 MG: 1 INJECTION, SOLUTION INTRAMUSCULAR; INTRAVENOUS; SUBCUTANEOUS at 08:30

## 2022-08-16 RX ADMIN — KETOROLAC TROMETHAMINE 30 MG: 30 INJECTION, SOLUTION INTRAMUSCULAR at 08:29

## 2022-08-16 RX ADMIN — PREDNISONE 60 MG: 20 TABLET ORAL at 08:28

## 2022-08-16 NOTE — ED PROVIDER NOTES
EMERGENCY DEPARTMENT ENCOUNTER    Room Number:  40/40  Date of encounter:  8/16/2022  PCP: Charly Jackson MD  Historian: Pt    Patient was placed in face mask during triage process. Patient was wearing facemask when I entered the room and throughout our encounter. I wore full protective equipment throughout this patient encounter including a face mask, eye protection, and gloves. Hand hygiene was performed before donning protective equipment and again following doffing of PPE after leaving the room.    HPI:  Chief Complaint: Neck and right shoulder pain  A complete HPI/ROS/PMH/PSH/SH/FH are unobtainable due to: N/A   Context: Sadiq Alexander is a 47 y.o. male who presents to the ED c/o right sided neck pain radiates into his right shoulder.  Patient reports he awakened yesterday morning feeling stiff in his right trapezius area and was unable to get comfortable to sleep last night.  This morning is much worse.  No radiation into the right upper extremity.  No numbness or weakness of any extremity.  No fever, headache, chest pain, shortness of breath, abdominal pain, incontinence or saddle anesthesia reported.  Pain is moderately severe at this time and worsened with any movement of the head.  No known trauma.  No new exercise regimen reported.  Patient takes diclofenac once or twice daily for low back discomfort.  1 dose yesterday.      MEDICAL HISTORY REVIEW  EMR reviewed:    Follows c Dr. Hurst, pain management, for back pain  --------------------  JEIMY reviewed    PAST MEDICAL HISTORY  Active Ambulatory Problems     Diagnosis Date Noted   • Colon polyp 09/02/2016   • Internal hemorrhoids 09/02/2016   • Migraines 09/02/2016   • Nicotine dependence 09/02/2016   • Anxiety disorder 02/26/2018   • Hyperlipidemia 06/21/2018   • Irritable bowel syndrome with both constipation and diarrhea 10/24/2018   • GERD with esophagitis 12/10/2018   • Restless legs syndrome (RLS) 07/09/2019   • Degenerative lumbar disc 11/11/2019    • Synovial cyst of lumbar facet joint 11/11/2019   • Chronic back pain 11/12/2019   • Lumbar facet arthropathy 01/31/2020   • Muscle spasm 07/29/2020   • Chronic midline back pain 03/08/2021     Resolved Ambulatory Problems     Diagnosis Date Noted   • Other chronic pain 11/11/2019     Past Medical History:   Diagnosis Date   • Abnormal MRI, knee 02/04/2021   • ADHD    • Anxiety    • Arthritis    • Blood in stool    • Bowel incontinence    • Chronic midline low back pain without sciatica    • Colon polyps    • Constipation    • DDD (degenerative disc disease), lumbar    • Environmental allergies    • Fatigue    • GERD (gastroesophageal reflux disease)    • Hemorrhoids 05/24/2017   • Hiatal hernia 11/26/2018   • History of traumatic head injury    • IBS (irritable bowel syndrome)    • Influenza 01/07/2014   • Ligamentous laxity of left knee 01/2021   • Low back pain 10/10/2019   • Lumbar paraspinal muscle spasm    • Lumbar spondylosis    • Lumbar strain 09/2019   • Lumbar strain 08/04/2018   • Multiple atypical skin moles    • NSAID long-term use    • Osteoarthritis of lumbar spine    • Perianal venous thrombosis 05/2017   • Right ankle sprain 12/22/2013   • RLS (restless legs syndrome)    • Shoulder impingement, right 01/26/2018   • Sprain and strain of left ankle 12/22/2013   • Strain of right knee 08/08/2016   • Thrombosed hemorrhoids 03/2022   • Thumb laceration, left, initial encounter 01/08/2017         PAST SURGICAL HISTORY  Past Surgical History:   Procedure Laterality Date   • CHOLECYSTECTOMY WITH INTRAOPERATIVE CHOLANGIOGRAM N/A 2/5/2019    Procedure: CHOLECYSTECTOMY LAPAROSCOPIC INTRAOPERATIVE CHOLANGIOGRAM;  Surgeon: Reji Hoffman MD;  Location: [facility];  Service: General   • COLONOSCOPY N/A 03/11/2015    Two 4-6 mm polyps at the recto-sigmoid and in the mid ascending colon (PATH: tubulovillous adenoma w/ low-grade dysplasia & hyperplastic colon polyp at 20 cm), internal hemorrhoids;   Chato Allen   • COLONOSCOPY N/A 09/13/2018    Tortuous colon, internal hemorrhoids, Dr. Chato Allen.   • ENDOSCOPY N/A 01/07/2019    IRREGULAR Z LINE AT 41CM, ACTIVE DUODENITIS, MILD ACTIVE ESOPHAGITIS, REACTIVE GASTROPATHY, DR. CHATO ALLEN AT [facility]   • HEMORRHOID BANDING N/A    • MEDIAL BRANCH BLOCK Bilateral 01/15/2020    medial branch block (Bilateral lumbar 3 - Lumbar 5 synovial cyst), Dr. Nathaniel Husrt.   • NERVE SURGERY Bilateral 3/16/2021    Procedure: Bilateral L3-L5 RADIOFREQUENCY ABLATION LUMBAR;  Surgeon: Nathaniel Hurst MD;  Location: [facility];  Service: Pain Management;  Laterality: Bilateral;   • RADIOFREQUENCY ABLATION Bilateral 05/27/2020    L3-L5 Radiofrequency ablation lumbar, Surgeon Nathaniel Hurst.   • RADIOFREQUENCY ABLATION Bilateral 11/18/2021    Procedure: Bilateral L3-L5 RADIOFREQUENCY ABLATION LUMBAR;  Surgeon: Nathaniel Hurst MD;  Location: [facility];  Service: Pain Management;  Laterality: Bilateral;   • RADIOFREQUENCY ABLATION Bilateral 6/16/2022    Procedure: Bilateral L3-L5 RADIOFREQUENCY ABLATION LUMBAR;  Surgeon: Nathaniel Hurst MD;  Location: [facility];  Service: Pain Management;  Laterality: Bilateral;   • REFRACTIVE SURGERY Bilateral 2018    LASIK   • SHOULDER ACROMIOPLASTY Right 02/08/2015    WITH DEBRIDEMENT OF LABRUM, DR. PANDA ENRIQUEZ   • SHOULDER ARTHROSCOPY W/ LABRAL REPAIR Left 06/2005    DR. PANDA ENRIQUEZ   • STEROID INJECTION KNEE Left 02/10/2021    DR. VEDA LUIS   • THROMBECTOMY N/A 05/24/2017    Procedure; External hemorrhoid thrombectomy, DR. BAMBI DONALD         FAMILY HISTORY  Family History   Problem Relation Age of Onset   • Melanoma Father    • Depression Father    • Cancer Father    • No Known Problems Sister    • Parkinsonism Maternal Grandmother    • Arthritis Maternal Grandmother    • Migraine headaches Maternal Grandmother    • Lung cancer Maternal Grandfather         smoker   • [place]'s disease  Maternal Grandfather    • Cancer Maternal Grandfather    • Migraine headaches Maternal Grandfather    • Lung cancer Paternal Grandmother         smoker   • Cancer Paternal Grandmother    • Migraine headaches Paternal Grandmother    • Lung cancer Paternal Aunt         smoker   • Cancer Paternal Aunt    • Cancer Maternal Aunt         spine   • Heart attack Paternal Uncle 55   • Colon cancer Neg Hx    • Prostate cancer Neg Hx          SOCIAL HISTORY  Social History     Socioeconomic History   • Marital status:      Spouse name: Doris   • Number of children: 3   Tobacco Use   • Smoking status: Former Smoker     Packs/day: 0.75     Years: 0.00     Pack years: 0.00     Types: Cigarettes     Start date: 3/8/2019     Quit date: 2019     Years since quittin.9   • Smokeless tobacco: Former User     Types: Chew     Quit date: 3/8/2019   • Tobacco comment: when he drinks   Vaping Use   • Vaping Use: Never used   Substance and Sexual Activity   • Alcohol use: Yes     Alcohol/week: 3.0 standard drinks     Types: 1 Glasses of wine, 1 Cans of beer, 1 Shots of liquor per week     Comment: 3 days (1 drink)   • Drug use: Yes     Types: Marijuana     Comment: used marijuana in the past in college   • Sexual activity: Yes     Partners: Female     Comment: .         ALLERGIES  Patient has no known allergies.        REVIEW OF SYSTEMS  Review of Systems     All systems reviewed and negative except for those discussed in HPI.       PHYSICAL EXAM    I have reviewed the triage vital signs and nursing notes.    ED Triage Vitals   Temp Heart Rate Resp BP SpO2   22 0728 22 0728 22 0728 22 0800 22 0728   98.8 °F (37.1 °C) 101 18 132/83 94 %      Temp src Heart Rate Source Patient Position BP Location FiO2 (%)   -- 22 0800 22 0800 22 0800 --    Monitor Sitting Right arm      Physical Exam    Physical Exam   Constitutional: No distress.  Uncomfortable appearing but not overtly  toxic.  HENT:  Head: Normocephalic and atraumatic.   Oropharynx: Mucous membranes are moist.   Eyes: No scleral icterus. No conjunctival pallor.  Neck: Patient holding head very still.  Discomfort with palpation throughout the upper trapezius along the neck and all the way out to the right shoulder with no deltoid discomfort.  No significant pain with range of motion passively of the right shoulder.  C5-8 motor and sensory grossly intact.  Cardiovascular: Normal rate, regular rhythm and intact distal pulses.  Pulmonary/Chest: No respiratory distress. There are no wheezes, no rhonchi, and no rales.   Abdominal: Soft. There is no tenderness. There is no rebound and no guarding.   Musculoskeletal: Moves all extremities equally. There is no pedal edema or calf tenderness.   Neurological: Alert.  Baseline strength and sensation noted.   Skin: Skin is pink, warm, and dry. No pallor.   Psychiatric: Mood and affect normal.   Nursing note and vitals reviewed.    LAB RESULTS  No results found for this or any previous visit (from the past 24 hour(s)).    Ordered the above labs and independently reviewed the results.        RADIOLOGY  No Radiology Exams Resulted Within Past 24 Hours    I ordered the above noted radiological studies. Reviewed by me and discussed with radiologist.  See dictation for official radiology interpretation.      PROCEDURES    Procedures        MEDICATIONS GIVEN IN ER    Medications   LORazepam (ATIVAN) tablet 1 mg (1 mg Oral Given 8/16/22 0828)   HYDROmorphone (DILAUDID) injection 1 mg (1 mg Intramuscular Given 8/16/22 0830)   ketorolac (TORADOL) injection 30 mg (30 mg Intramuscular Given 8/16/22 0829)   predniSONE (DELTASONE) tablet 60 mg (60 mg Oral Given 8/16/22 0828)         PROGRESS, DATA ANALYSIS, CONSULTS, AND MEDICAL DECISION MAKING    My differential includes but is not limited to neck pain, cervical contusion, degenerative disc disease, herniated disc, acute cervical strain, cervical  radiculopathy, cervical fracture, torticollis, carotid artery dissection and vertebral artery dissection      All labs have been independently reviewed by me.  All radiology studies have been reviewed by me and discussed with radiologist dictating the report.   EKG's independently viewed and interpreted by me.  Discussion below represents my analysis of pertinent findings related to patient's condition, differential diagnosis, treatment plan and final disposition.      ED Course as of 08/16/22 0929   Tue Aug 16, 2022   0926 Patient feeling improved.  He is eager for discharge.  We talked about red flags which would bring the patient back to the ED.  We will prescribe some steroids and I have offered him narcotics in case he has difficulty resting again tonight.  He has accepted.  All agreeable with discharge planning at this time. [RS]   0927 Brant query complete. Treatment plan to include limited course of prescribed controlled substance.  Risks including addiction, tolerance, sedation, benefits and alternatives presented to patient. [RS]      ED Course User Index  [RS] Pedro Ralph MD       AS OF 09:29 EDT VITALS:    BP - 132/83  HR - 86  TEMP - 98.8 °F (37.1 °C)  O2 SATS - 96%        DIAGNOSIS  Final diagnoses:   Torticollis, acute   Trapezius strain, right, initial encounter         DISPOSITION  DISCHARGE    Patient discharged in stable condition.    Reviewed implications of results, diagnosis, meds, responsibility to follow up, warning signs and symptoms of possible worsening, potential complications and reasons to return to ER.    Patient/Family voiced understanding of above instructions.    Discussed plan for discharge, as there is no emergent indication for admission. Patient referred to primary care provider for regular health maintenance. Pt/family is agreeable and understands need for follow up and possible repeat testing.  Pt is aware that discharge does not mean that nothing is wrong but it indicates  no emergency is present that requires admission and they must continue care with follow-up as given below or physician of their choice.     FOLLOW-UP  Charly Jackson MD  [address]  [place] [id number] [phone]    Schedule an appointment as soon as possible for a visit in 3 days  If symptoms fail to improve         Medication List      New Prescriptions    HYDROcodone-acetaminophen 5-325 MG per tablet  Commonly known as: NORCO  Take 1 tab by mouth every 6 hours as needed for pain     predniSONE 10 MG tablet  Commonly known as: DELTASONE  Take 4 tablets by mouth daily for 5 days then 2 tablets by mouth daily for 3 days then 1 tablet by mouth daily for 2 days        Changed    cyclobenzaprine 10 MG tablet  Commonly known as: FLEXERIL  TAKE 1 TABLET BY MOUTH THREE TIMES A DAY  What changed:   · when to take this  · reasons to take this           Where to Get Your Medications      These medications were sent to [facility]/pharmacy #[id number] - [place], [place] - [address] - [phone]  - [phone] FX  [address], [facility] [address]    Hours: 24-hours Phone: [phone]   · HYDROcodone-acetaminophen 5-325 MG per tablet  · predniSONE 10 MG tablet            Pedro Ralph MD  08/16/22 9230

## 2022-08-16 NOTE — ED NOTES
Appropriate PPE was worn while caring for this patient (N95 and eye protection). Pt also wore a mask.

## 2022-08-16 NOTE — ED TRIAGE NOTES
Patient to ER via car from home for neck and shoulder pain on the right side that started yesterday. Patient states no known injury but states that he thinks he slept on it wrong    Patient wearing mask this RN in PPE

## 2022-08-17 ENCOUNTER — OFFICE VISIT (OUTPATIENT)
Dept: INTERNAL MEDICINE | Age: 47
End: 2022-08-17

## 2022-08-17 VITALS
HEIGHT: 69 IN | HEART RATE: 91 BPM | WEIGHT: 199 LBS | DIASTOLIC BLOOD PRESSURE: 80 MMHG | BODY MASS INDEX: 29.47 KG/M2 | TEMPERATURE: 97.7 F | SYSTOLIC BLOOD PRESSURE: 110 MMHG | OXYGEN SATURATION: 98 %

## 2022-08-17 DIAGNOSIS — Z00.00 ENCOUNTER FOR ANNUAL HEALTH EXAMINATION: Primary | ICD-10-CM

## 2022-08-17 PROCEDURE — 99396 PREV VISIT EST AGE 40-64: CPT | Performed by: INTERNAL MEDICINE

## 2022-08-17 NOTE — PROGRESS NOTES
"    I N T E R N A L  M E D I C I N E  J U N O H  K I M,  M D      ENCOUNTER DATE:  08/17/2022    Sadiq HERNANDES Alexander / 47 y.o. / male    CHIEF COMPLAINT     Annual Exam (6/20/18)      VITALS     Vitals:    08/17/22 0946   BP: 110/80   BP Location: Left arm   Pulse: 91   Temp: 97.7 °F (36.5 °C)   SpO2: 98%   Weight: 90.3 kg (199 lb)   Height: 175.3 cm (69\")       BP Readings from Last 3 Encounters:   08/17/22 110/80   08/16/22 132/83   08/09/22 114/80     Wt Readings from Last 3 Encounters:   08/17/22 90.3 kg (199 lb)   08/16/22 88.5 kg (195 lb)   08/09/22 90.6 kg (199 lb 12.8 oz)      Body mass index is 29.39 kg/m².    Blood pressure readings recorded on patient flowsheet:  No flowsheet data found.     MEDICATIONS     Current Outpatient Medications on File Prior to Visit   Medication Sig Dispense Refill   • cyclobenzaprine (FLEXERIL) 10 MG tablet TAKE 1 TABLET BY MOUTH THREE TIMES A DAY (Patient taking differently: Take 10 mg by mouth 3 (Three) Times a Day As Needed for Muscle Spasms.) 90 tablet 3   • diclofenac (VOLTAREN) 50 MG EC tablet TAKE 1 TABLET BY MOUTH 2 TIMES A DAY AS NEEDED FOR PAIN 180 tablet 1   • Fremanezumab-vfrm (Ajovy) 225 MG/1.5ML solution prefilled syringe Inject 1.5 mL under the skin into the appropriate area as directed Every 30 (Thirty) Days. 4.5 mL 3   • HYDROcodone-acetaminophen (NORCO) 5-325 MG per tablet Take 1 tab by mouth every 6 hours as needed for pain 15 tablet 0   • pantoprazole (PROTONIX) 40 MG EC tablet Take 1 tablet by mouth Daily. 90 tablet 3   • predniSONE (DELTASONE) 10 MG tablet Take 4 tablets by mouth daily for 5 days then 2 tablets by mouth daily for 3 days then 1 tablet by mouth daily for 2 days 28 tablet 0   • rosuvastatin (CRESTOR) 10 MG tablet Take 1 tablet by mouth Daily. 90 tablet 3   • [DISCONTINUED] hydrocortisone (ANUSOL-HC) 25 MG suppository 1 per rectum at bedtime as needed for hemorrhoidal bleeding 10 suppository 5   • [DISCONTINUED] Hydrocortisone, Perianal, (ANUSOL-HC) " 2.5 % rectal cream APPLY RECTALLY 3 TIMES DAILY. INCLUDE APPLICATOR. 30 g 1     Current Facility-Administered Medications on File Prior to Visit   Medication Dose Route Frequency Provider Last Rate Last Admin   • [DISCONTINUED] LORazepam (ATIVAN) tablet 1 mg  1 mg Oral Q6H PRN Pedro Ralph MD   1 mg at 08/16/22 0828         HISTORY OF PRESENT ILLNESS      Sadiq presents for annual health maintenance visit.  Unfortunately Sadiq has resumed smoking.  He was seen in the emergency department yesterday for neck strain.  He continues to follow-up with neurologist for migraines.  GERD remains stable on pantoprazole.  He is on rosuvastatin 10 mg for hyperlipidemia.  Previously his cholesterol level was noted to be higher which may have been due to poor compliance with medication.  He sees pain management for chronic low back pain.    · General health: some medical problems  · Lifestyle:  · Attempting to lose weight?: No   · Diet: eats decently  · Exercise: exercises 2 days/week  · Tobacco: Resumed smoking   · Alcohol: 3-4 occasions/week and 1-2 drinks/occasion  · Work: Full-time  · Reproductive health:  · Sexually active?: Yes   · Concern for STD?: No   · Sexual problems?: No problems   · Sees Urologist?: No   · Depression Screening:      PHQ-2/PHQ-9 Depression Screening 8/17/2022   Retired PHQ-9 Total Score -   Retired Total Score -   Little Interest or Pleasure in Doing Things 0-->not at all   Feeling Down, Depressed or Hopeless 0-->not at all   PHQ-9: Brief Depression Severity Measure Score 0         PHQ-2: 0 (Not depressed)     PHQ-9: 0 (Negative screening for depression)    Patient Care Team:  Charly Jackson MD as PCP - General (Internal Medicine)  Nathaniel Hurst MD as Consulting Physician (Pain Medicine)  Tommy Zimmer APRN as Nurse Practitioner (Neurology)  Aj Bone MD as Consulting Physician (Orthopedic Surgery)  Chato Allen MD as Consulting Physician (Gastroenterology)  Prabha Guerrero  CIERA NIEVES as Nurse Practitioner (Nurse Practitioner)  ______________________________________________________________________    ALLERGIES  No Known Allergies     PFSH:     The following portions of the patient's history were reviewed and updated as appropriate: Allergies / Current Medications / Past Medical History / Surgical History / Social History / Family History    PROBLEM LIST   Patient Active Problem List   Diagnosis   • Colon polyp   • Internal hemorrhoids   • Migraines   • Nicotine dependence   • Anxiety disorder   • Hyperlipidemia   • Irritable bowel syndrome with both constipation and diarrhea   • GERD with esophagitis   • Restless legs syndrome (RLS)   • Degenerative lumbar disc   • Synovial cyst of lumbar facet joint   • Chronic back pain   • Lumbar facet arthropathy   • Muscle spasm   • Chronic midline back pain       PAST MEDICAL HISTORY  Past Medical History:   Diagnosis Date   • Abnormal MRI, knee 02/04/2021    LRFT KNEE, MRI DONE AT [facility]   • ADHD    • Anxiety    • Arthritis    • Blood in stool    • Bowel incontinence    • Chronic midline low back pain without sciatica    • Colon polyps     FOLLOWED BY DR. CHATO GALDAMEZ   • Constipation    • DDD (degenerative disc disease), lumbar    • Environmental allergies    • Fatigue    • GERD (gastroesophageal reflux disease)    • Hemorrhoids 05/24/2017   • Hiatal hernia 11/26/2018    Dr. Chato Galdamez.   • History of traumatic head injury    • Hyperlipidemia    • IBS (irritable bowel syndrome)     IBS WITH BOTH CONSTIPATION & DIARRHEA.   • Influenza 01/07/2014   • Ligamentous laxity of left knee 01/2021   • Low back pain 10/10/2019    [facility], Right sided low back pain without sciatica.   • Lumbar facet arthropathy    • Lumbar paraspinal muscle spasm    • Lumbar spondylosis    • Lumbar strain 09/2019   • Lumbar strain 08/04/2018    SEEN AT [facility] ER   • Migraines    • Multiple atypical skin moles     See's derm and has family history of melanoma.   •  NSAID long-term use    • Osteoarthritis of lumbar spine    • Perianal venous thrombosis 05/2017   • Right ankle sprain 12/22/2013   • RLS (restless legs syndrome)    • Shoulder impingement, right 01/26/2018    FOLLOWED BY DR. PANDA ENRIQUEZ   • Sprain and strain of left ankle 12/22/2013    [facility], ankle injury.   • Strain of right knee 08/08/2016    FOLLOWED BY DR. PANDA ENRIQUEZ   • Synovial cyst of lumbar facet joint    • Thrombosed hemorrhoids 03/2022   • Thumb laceration, left, initial encounter 01/08/2017    [facility], cut it while cooking.       SURGICAL HISTORY  Past Surgical History:   Procedure Laterality Date   • CHOLECYSTECTOMY WITH INTRAOPERATIVE CHOLANGIOGRAM N/A 2/5/2019    Procedure: CHOLECYSTECTOMY LAPAROSCOPIC INTRAOPERATIVE CHOLANGIOGRAM;  Surgeon: Reji Hoffman MD;  Location: [facility] MAIN OR;  Service: General   • COLONOSCOPY N/A 03/11/2015    Two 4-6 mm polyps at the recto-sigmoid and in the mid ascending colon (PATH: tubulovillous adenoma w/ low-grade dysplasia & hyperplastic colon polyp at 20 cm), internal hemorrhoids; Dr. Chato Allen   • COLONOSCOPY N/A 09/13/2018    Tortuous colon, internal hemorrhoids, Dr. Chato Allen.   • ENDOSCOPY N/A 01/07/2019    IRREGULAR Z LINE AT 41CM, ACTIVE DUODENITIS, MILD ACTIVE ESOPHAGITIS, REACTIVE GASTROPATHY, DR. CHATO ALLEN AT [facility]   • HEMORRHOID BANDING N/A    • MEDIAL BRANCH BLOCK Bilateral 01/15/2020    medial branch block (Bilateral lumbar 3 - Lumbar 5 synovial cyst), Dr. Nathaniel Hurst.   • NERVE SURGERY Bilateral 3/16/2021    Procedure: Bilateral L3-L5 RADIOFREQUENCY ABLATION LUMBAR;  Surgeon: Nathaniel Hurst MD;  Location: [facility] MAIN OR;  Service: Pain Management;  Laterality: Bilateral;   • RADIOFREQUENCY ABLATION Bilateral 05/27/2020    L3-L5 Radiofrequency ablation lumbar, Surgeon Nathaniel Hurst.   • RADIOFREQUENCY ABLATION Bilateral 11/18/2021    Procedure: Bilateral L3-L5 RADIOFREQUENCY  ABLATION LUMBAR;  Surgeon: Nathaniel Hurst MD;  Location: [facility] MAIN OR;  Service: Pain Management;  Laterality: Bilateral;   • RADIOFREQUENCY ABLATION Bilateral 2022    Procedure: Bilateral L3-L5 RADIOFREQUENCY ABLATION LUMBAR;  Surgeon: Nathaniel Hurst MD;  Location: [facility] MAIN OR;  Service: Pain Management;  Laterality: Bilateral;   • REFRACTIVE SURGERY Bilateral 2018    LASIK   • SHOULDER ACROMIOPLASTY Right 2015    WITH DEBRIDEMENT OF LABRUM, DR. PANDA ENRIQUEZ   • SHOULDER ARTHROSCOPY W/ LABRAL REPAIR Left 2005    DR. PANDA ENRIQUEZ   • STEROID INJECTION KNEE Left 02/10/2021    DR. VEDA LUIS   • THROMBECTOMY N/A 2017    Procedure; External hemorrhoid thrombectomy, DR. BAMBI DONALD       SOCIAL HISTORY  Social History     Socioeconomic History   • Marital status:      Spouse name: Doris   • Number of children: 3   Tobacco Use   • Smoking status: Current Some Day Smoker     Packs/day: 0.75     Years: 0.00     Pack years: 0.00     Types: Cigarettes     Start date: 3/8/2019     Last attempt to quit: 2019     Years since quittin.9   • Smokeless tobacco: Former User     Types: Chew     Quit date: 3/8/2019   • Tobacco comment: smokes about 1 pack / week   Vaping Use   • Vaping Use: Never used   Substance and Sexual Activity   • Alcohol use: Yes     Alcohol/week: 3.0 standard drinks     Types: 1 Glasses of wine, 1 Cans of beer, 1 Shots of liquor per week     Comment: 4 days (1-2 weeks)   • Drug use: Not Currently     Comment: used marijuana in the past in college   • Sexual activity: Yes     Partners: Female       FAMILY HISTORY  Family History   Problem Relation Age of Onset   • No Known Problems Mother    • Melanoma Father    • Depression Father    • No Known Problems Sister    • Parkinsonism Maternal Grandmother    • Arthritis Maternal Grandmother    • Migraine headaches Maternal Grandmother    • Lung cancer Maternal Grandfather         smoker   • Andrew's disease Maternal  Grandfather    • Cancer Maternal Grandfather    • Migraine headaches Maternal Grandfather    • Lung cancer Paternal Grandmother         smoker   • Cancer Paternal Grandmother    • Migraine headaches Paternal Grandmother    • Cancer Maternal Aunt         spine   • Lung cancer Paternal Aunt         smoker   • Cancer Paternal Aunt    • Heart attack Paternal Uncle 55   • Colon cancer Neg Hx    • Prostate cancer Neg Hx        IMMUNIZATION HISTORY  Immunization History   Administered Date(s) Administered   • COVID-19 (PFIZER) PURPLE CAP 04/02/2021, 04/23/2021, 12/16/2021   • FluLaval/Fluarix/Fluzone >6 01/08/2021   • Tdap 01/18/2017   • flucelvax quad pfs =>4 YRS 10/24/2018, 10/29/2019         REVIEW OF SYSTEMS     Review of Systems   Constitutional: Negative.    HENT: Negative.    Eyes: Negative.    Respiratory: Negative.    Cardiovascular: Negative.    Gastrointestinal: Negative.    Endocrine: Negative.    Genitourinary: Negative.    Musculoskeletal: Negative.    Skin: Negative.         Sees dermatologist   Allergic/Immunologic: Negative.    Neurological: Negative.    Hematological: Negative.    Psychiatric/Behavioral: Negative.         Work stress is high         PHYSICAL EXAMINATION     Physical Exam  Constitutional:       General: He is not in acute distress.     Appearance: He is well-developed.      Comments: Overweight   HENT:      Head: Normocephalic and atraumatic.      Right Ear: Tympanic membrane, ear canal and external ear normal.      Left Ear: Tympanic membrane, ear canal and external ear normal.   Eyes:      General: No scleral icterus.     Conjunctiva/sclera: Conjunctivae normal.      Pupils: Pupils are equal, round, and reactive to light.   Neck:      Thyroid: No thyroid mass or thyromegaly.      Vascular: No carotid bruit.      Trachea: No tracheal deviation.   Cardiovascular:      Rate and Rhythm: Normal rate and regular rhythm.      Pulses: Normal pulses.      Heart sounds: Normal heart sounds.       Comments: No carotid bruit  Pulmonary:      Effort: Pulmonary effort is normal.      Breath sounds: Normal breath sounds.   Chest:   Breasts:      Right: No supraclavicular adenopathy.      Left: No supraclavicular adenopathy.       Abdominal:      General: There is no distension.      Palpations: Abdomen is soft. There is no mass.      Tenderness: There is abdominal tenderness (Mild tenderness with palpation of the abdomen). There is no guarding or rebound.      Hernia: No hernia is present.   Musculoskeletal:      Cervical back: Neck supple.      Right lower leg: No edema.      Left lower leg: No edema.   Lymphadenopathy:      Cervical: No cervical adenopathy.      Upper Body:      Right upper body: No supraclavicular adenopathy.      Left upper body: No supraclavicular adenopathy.   Skin:     General: Skin is warm.      Coloration: Skin is not jaundiced or pale.      Findings: No rash. Lesion: Negative for suspicious skin lesions/growths.      Comments: No jaundice  No suspicious skin lesions.    Neurological:      Mental Status: He is alert and oriented to person, place, and time.      Cranial Nerves: No cranial nerve deficit.      Motor: No abnormal muscle tone.   Psychiatric:         Mood and Affect: Mood normal.         Behavior: Behavior normal.         Thought Content: Thought content normal.         Judgment: Judgment normal.         REVIEWED DATA      Labs:    Lab Results   Component Value Date     02/16/2022    K 4.5 02/16/2022    CALCIUM 9.5 02/16/2022    AST 20 02/16/2022    ALT 26 02/16/2022    BUN 13 02/16/2022    CREATININE 1.28 (H) 02/16/2022    CREATININE 1.22 01/08/2021    CREATININE 1.21 08/12/2019    EGFRIFNONA 67 02/16/2022    EGFRIFAFRI 78 02/16/2022       Lab Results   Component Value Date    GLUCOSE 99 02/16/2022    HGBA1C 5.51 06/20/2018    TSH 0.867 06/20/2018    FREET4 1.10 06/20/2018       Lab Results   Component Value Date    PSA 1.600 06/20/2018       Lab Results   Component  Value Date    TESTOSTEROTT 434 06/20/2018       Lab Results   Component Value Date     (H) 02/16/2022    HDL 36 (L) 02/16/2022    TRIG 159 (H) 02/16/2022    CHOLHDLRATIO 5.0 02/16/2022       No components found for: [id number]    Lab Results   Component Value Date    WBC 12.64 (H) 01/08/2021    HGB 13.9 01/08/2021    MCV 83.5 01/08/2021     01/08/2021       Lab Results   Component Value Date    PROTEIN Negative 06/20/2018    GLUCOSEU Negative 06/20/2018    BLOODU Negative 06/20/2018    NITRITEU Negative 06/20/2018    LEUKOCYTESUR Negative 06/20/2018        No results found for: HEPCVIRUSABY    Imaging:           Medical Tests:           ASSESSMENT & PLAN     ANNUAL WELLNESS EXAM / PHYSICAL     Other medical problems addressed today:  Problem List Items Addressed This Visit    None     Visit Diagnoses     Encounter for annual health examination    -  Primary          Summary/Discussion:     ·     Next Appointment with me: Visit date not found    Return in about 6 months (around 2/17/2023) for Reassess chronic medical problems.      HEALTHCARE MAINTENANCE ISSUES       Cancer Screening:  · Colon: Initial/Next screening at age: CURRENT  · Repeat colon cancer screening: every 5 years  · Prostate: Performed today and recommended annual screening  · Testicular: Recommended monthly self exam  · Skin: Monthly self skin examination, annual exam by health professional  · Lung: Does not meet criteria for lung cancer screening.   · Other:    Screening Labs & Tests:  · Lab results reviewed & discussed with with patient or orders placed today.  · EKG:  · CV Screening: Lipid panel  · DEXA (75+ or risk factors):   · HEP C (If born [id number] or risk factors): Ordered  · Other:     Immunization/Vaccinations (to be given today unless deferred by patient)  · Influenza: Recommended annual influenza vaccine  · Hepatitis A: Up to date  · Hepatitis B: Up to date  · Tetanus/Pertussis: Up to date  · Pneumovax/PCV: Not needed at  this time  · Shingles: Not needed at this time  · COVID: Completed primary vaccine series and 1st booster  Lifestyle Counseling:  · Lifestyle Modifications: Attempt to lose weight, Follow a low fat, low cholesterol diet, Decrease or avoid alcohol intake, Quit smoking, Reduce exposure to stress if possible and Discussed better management of stress/anxiety  · Safety Issues: Always wear seatbelt, Avoid texting while driving   · Use sunscreen, regular skin examination  · Recommended annual dental/vision examination.  · Emotional/Stress/Sleep: Reviewed and  given when appropriate      Health Maintenance   Topic Date Due   • HEPATITIS C SCREENING  Never done   • PROSTATE CANCER SCREENING  08/17/2022   • INFLUENZA VACCINE  10/01/2022   • LIPID PANEL  02/16/2023   • ANNUAL PHYSICAL  08/18/2023   • COLORECTAL CANCER SCREENING  09/13/2023   • TDAP/TD VACCINES (3 - Td or Tdap) 01/19/2027   • COVID-19 Vaccine  Completed   • Pneumococcal Vaccine 0-64  Aged Out           *Examiner was wearing KN95 mask and eye protection during the entire duration of the visit. Patient was masked the entire time. Minimum social distance of 6 ft maintained entire visit except if physical contact was necessary as documented.       Template created by Kristal Jackson MD

## 2022-12-09 ENCOUNTER — PREP FOR SURGERY (OUTPATIENT)
Dept: SURGERY | Facility: SURGERY CENTER | Age: 47
End: 2022-12-09

## 2022-12-09 ENCOUNTER — OFFICE VISIT (OUTPATIENT)
Dept: PAIN MEDICINE | Facility: CLINIC | Age: 47
End: 2022-12-09

## 2022-12-09 VITALS
SYSTOLIC BLOOD PRESSURE: 115 MMHG | RESPIRATION RATE: 12 BRPM | DIASTOLIC BLOOD PRESSURE: 79 MMHG | TEMPERATURE: 98 F | WEIGHT: 196.6 LBS | HEART RATE: 72 BPM | BODY MASS INDEX: 29.12 KG/M2 | HEIGHT: 69 IN | OXYGEN SATURATION: 97 %

## 2022-12-09 DIAGNOSIS — M47.816 LUMBAR FACET ARTHROPATHY: Primary | ICD-10-CM

## 2022-12-09 DIAGNOSIS — G89.29 CHRONIC MIDLINE LOW BACK PAIN WITHOUT SCIATICA: ICD-10-CM

## 2022-12-09 DIAGNOSIS — M62.838 MUSCLE SPASM: ICD-10-CM

## 2022-12-09 DIAGNOSIS — M54.50 CHRONIC MIDLINE LOW BACK PAIN WITHOUT SCIATICA: ICD-10-CM

## 2022-12-09 DIAGNOSIS — G89.29 OTHER CHRONIC PAIN: ICD-10-CM

## 2022-12-09 PROCEDURE — 99214 OFFICE O/P EST MOD 30 MIN: CPT | Performed by: NURSE PRACTITIONER

## 2022-12-09 RX ORDER — SODIUM CHLORIDE 0.9 % (FLUSH) 0.9 %
10 SYRINGE (ML) INJECTION EVERY 12 HOURS SCHEDULED
Status: CANCELLED | OUTPATIENT
Start: 2022-12-09

## 2022-12-09 RX ORDER — SODIUM CHLORIDE 0.9 % (FLUSH) 0.9 %
10 SYRINGE (ML) INJECTION AS NEEDED
Status: CANCELLED | OUTPATIENT
Start: 2022-12-09

## 2022-12-09 NOTE — PROGRESS NOTES
CHIEF COMPLAINT  4 month lumbar pain evaluation  Patient in office reports worsened pain over the last month wants to discuss repeating another RFA .    Subjective   Sadiq Alexander is a 47 y.o. male  who presents for follow-up.  He has a history of back pain.  Today his pain is 6/10VAS in severity. His low back pain worsened over the last few days. He has resumed utilizing Flexeril 10 mg for this. He continues to utilize diclofenac as well as OTC tylenol.     His pain remains in his low back without any radicular component. He denies any lower extremity weakness or changes to bowel or bladder. He describes his pain as aching, burning, stabbing pain.     Procedure list (last 2 years):  6/16/2022-bilateral L3-L5 RFA-80% relief x ~5.5 months, relief is now beginning to wane, but has not fully returned to baseline.   11/18/2021-bilateral L3-L5 RFA-80 to 90% relief times ~5.5-6 months.   3/16/2021-bilateral L3-L5 RFA-80% relief x6 months  5/27/2020-bilateral L3-L5 RFA-60% relief    Back Pain  This is a chronic problem. The current episode started more than 1 year ago. The problem occurs constantly. The problem has been gradually worsening since onset. The pain is present in the lumbar spine. The quality of the pain is described as aching, burning and stabbing. The pain does not radiate. The pain is at a severity of 6/10. Worse during: worsens as day progresses. The symptoms are aggravated by bending, position, standing and twisting. Associated symptoms include abdominal pain, bowel incontinence, headaches and weakness. Pertinent negatives include no bladder incontinence, chest pain, dysuria, fever or numbness. He has tried chiropractic manipulation, heat, home exercises, analgesics, bed rest, ice, muscle relaxant, NSAIDs and walking (PT, MBB-significant diagnostic relief) for the symptoms. The treatment provided moderate (RFL) relief.      PEG Assessment   What number best describes your pain on average in the past  week?8  What number best describes how, during the past week, pain has interfered with your enjoyment of life?8  What number best describes how, during the past week, pain has interfered with your general activity?  8    Review of Pertinent Medical Data ---  MRI OF THE LUMBAR SPINE WITHOUT CONTRAST      CLINICAL HISTORY: Low back pain radiating into both hips for 20 years.  Re-injured back 1 month ago. Follow-up synovial cyst from previous MRI.     TECHNIQUE:  MRI of the lumbar spine was obtained with sagittal T1,  proton-density, and T2-weighted images. Additionally, there are axial T1  and T2-weighted images through the lumbar spine.     COMPARISON: Comparison is made to previous MRI of the lumbar spine dated  01/25/2017.     FINDINGS:  There is loss of the usual lordotic curvature of the lumbar spine and  minimal levoconvex scoliotic curvature is seen centered at L4.     The conus medullaris terminates at the L1-2 level and has normal signal  intensity.     At L1-2, there is no significant degree of canal or foraminal narrowing.  There is a disc osteophyte complex minimally indenting the ventral  subarachnoid space. Mild facet arthropathy is identified. Prominent  degenerative disc changes are identified at L1-2.     At L2-3, there is mild facet arthropathy but no significant canal or  foraminal narrowing is identified.     At L3-4, there is mild-to-moderate facet arthropathy. There is mild disc  bulging which is eccentric to the left and this results in a mild degree  of left and a minimal degree of right foraminal narrowing. The bulging  disc material approaches and may abut the already exited left L3 nerve  root. Similar findings are noted on the prior examination. Minimal canal  stenosis is identified at the L3-4 level. Again, these findings are  stable.     At L4-5, there is a disc bulge with an annular fissure. The bulging disc  material is eccentric to the left and this results in a mild degree of  left  foraminal narrowing. The bulging disc material approaches and may  abut the already exited left L4 nerve root. Mild-to-moderate facet  arthropathy is seen. Again no significant interval change is identified  when compared to the prior exam.     At L5-S1, there is moderate facet arthropathy. There are synovial cysts  noted arising from the left facet joint and these project laterally into  the posterior paravertebral musculature. There is mild foraminal  narrowing secondary to bulging disc material. Again, the findings are  unchanged when compared to the prior exam.     IMPRESSION:  When compared to the prior study, there is no significant interval  change. There is loss of the usual lordotic curvature of the lumbar  spine and minimal levoconvex scoliotic curvature is seen with its apex  centered at L4.     At L3-4 and L4-5, there is bulging disc material eccentric to the left  which approaches and may abut the already exited left L3 and L4 nerve  roots respectively.     This report was finalized on 12/2/2019 4:38 PM by Dr. Ollie Osman M.D.    The following portions of the patient's history were reviewed and updated as appropriate: allergies, current medications, past family history, past medical history, past social history, past surgical history and problem list.    Review of Systems   Constitutional: Negative for activity change (less), fatigue and fever.   HENT: Negative for congestion.    Eyes: Negative for visual disturbance.   Respiratory: Negative for cough and chest tightness.    Cardiovascular: Negative for chest pain.   Gastrointestinal: Positive for abdominal pain, bowel incontinence, constipation and diarrhea.   Genitourinary: Negative for bladder incontinence, difficulty urinating and dysuria.   Musculoskeletal: Positive for back pain.   Neurological: Positive for dizziness, weakness, light-headedness and headaches. Negative for numbness.   Psychiatric/Behavioral: Positive for sleep disturbance.  "Negative for agitation and suicidal ideas. The patient is not nervous/anxious.      --  The aforementioned information the Chief Complaint section and above subjective data including any HPI data, and also the Review of Systems data, has been personally reviewed and affirmed.  --    Vitals:    12/09/22 1322   BP: 115/79   BP Location: Left arm   Patient Position: Sitting   Cuff Size: Large Adult   Pulse: 72   Resp: 12   Temp: 98 °F (36.7 °C)   TempSrc: Temporal   SpO2: 97%   Weight: 89.2 kg (196 lb 9.6 oz)   Height: 175.3 cm (69\")   PainSc:   6   PainLoc: Comment: back     Objective   Physical Exam  Vitals and nursing note reviewed.   Constitutional:       Appearance: Normal appearance. He is well-developed.   Eyes:      General: Lids are normal.   Cardiovascular:      Rate and Rhythm: Normal rate.   Pulmonary:      Effort: Pulmonary effort is normal.   Musculoskeletal:      Lumbar back: Tenderness present. Decreased range of motion.      Comments:   +Lumbar facet loading   Neurological:      Mental Status: He is alert and oriented to person, place, and time.   Psychiatric:         Attention and Perception: Attention normal.         Mood and Affect: Mood normal.         Speech: Speech normal.         Behavior: Behavior normal.         Judgment: Judgment normal.       Assessment & Plan   Diagnoses and all orders for this visit:    1. Lumbar facet arthropathy (Primary)  -     Ambulatory Referral to Physical Therapy Evaluate and treat    2. Chronic midline low back pain without sciatica  -     Ambulatory Referral to Physical Therapy Evaluate and treat    3. Other chronic pain    4. Muscle spasm      Thermal Radiofrequency Destruction    This repeat thermal radiofrequency destruction (RFA) of cervical, thoracic, or lumbar paravertebral facet joint (medial branch) nerves at the same anatomical site is considered medically reasonable and necessary as this patient has met the criteria of having a minimum of consistent 50% " improvement in pain for at least six (6) months or at least 50% consistent improvement in the ability to perform previously painful movements and ADLs as compared to baseline measurement using the same scale.    Pain / Disability Scale    The scale used for measurement of pain and/or disability for this patient was the [place] back pain disability scale.  The score was 36 on 12/09/2022    --- Repeat Bilateral L3-L5 RFA  Reviewed the procedure at length with the patient.  Included in the review was expectations, complications, risk and benefits.The procedure was described in detail and the risks, benefits and alternatives were discussed with the patient (including but not limited to: bleeding, infection, nerve damage, worsening of pain, inability to perform injection, paralysis, seizures, coma, no pain relief and death) who agreed to proceed.  Discussed the potential for sedation if warranted/wanted.  The procedure will plan to be performed at [facility] with fluoroscopic guidance(unless ultrasound is indicated) and could potentially have steroids and contrast dye used. Questions were answered and in a way the patient could understand.  Patient verbalized understanding and wishes to proceed.  This intervention will be ordered.  Discussed with patient that all procedures are part of a multimodal plan of care and include either formal PT or a home exercise program.  Patient has no evidence of coagulopathy or current infection.    --- Return to PT  --- Continue Flexeril, no refill needed.   --- Follow-up after procedure     Dictated utilizing Dragon dictation.       Patient remained masked during entire encounter. No cough present. I donned a mask and eye protection throughout entire visit. Prior to donning mask and eye protection, hand hygiene was performed, as well as when it was doffed.  I was closer than 6 feet, but not for an extended period of time. No obvious exposure to any bodily fluids.

## 2022-12-30 DIAGNOSIS — M47.816 LUMBAR FACET ARTHROPATHY: ICD-10-CM

## 2022-12-30 DIAGNOSIS — G89.29 OTHER CHRONIC PAIN: Primary | ICD-10-CM

## 2022-12-30 DIAGNOSIS — E78.5 HYPERLIPIDEMIA, UNSPECIFIED HYPERLIPIDEMIA TYPE: Chronic | ICD-10-CM

## 2022-12-30 DIAGNOSIS — K21.00 GASTROESOPHAGEAL REFLUX DISEASE WITH ESOPHAGITIS WITHOUT HEMORRHAGE: ICD-10-CM

## 2022-12-30 RX ORDER — PANTOPRAZOLE SODIUM 40 MG/1
TABLET, DELAYED RELEASE ORAL
Qty: 90 TABLET | Refills: 3 | Status: SHIPPED | OUTPATIENT
Start: 2022-12-30

## 2022-12-30 RX ORDER — ROSUVASTATIN CALCIUM 10 MG/1
TABLET, COATED ORAL
Qty: 90 TABLET | Refills: 3 | Status: SHIPPED | OUTPATIENT
Start: 2022-12-30

## 2023-01-05 ENCOUNTER — TRANSCRIBE ORDERS (OUTPATIENT)
Dept: SURGERY | Facility: SURGERY CENTER | Age: 48
End: 2023-01-05
Payer: COMMERCIAL

## 2023-01-05 DIAGNOSIS — Z41.9 SURGERY, ELECTIVE: Primary | ICD-10-CM

## 2023-01-17 ENCOUNTER — OFFICE VISIT (OUTPATIENT)
Dept: NEUROLOGY | Facility: CLINIC | Age: 48
End: 2023-01-17
Payer: COMMERCIAL

## 2023-01-17 VITALS
DIASTOLIC BLOOD PRESSURE: 72 MMHG | OXYGEN SATURATION: 98 % | BODY MASS INDEX: 29.03 KG/M2 | HEART RATE: 79 BPM | WEIGHT: 196 LBS | SYSTOLIC BLOOD PRESSURE: 108 MMHG | HEIGHT: 69 IN

## 2023-01-17 DIAGNOSIS — G43.119 INTRACTABLE MIGRAINE WITH AURA WITHOUT STATUS MIGRAINOSUS: Primary | Chronic | ICD-10-CM

## 2023-01-17 PROCEDURE — 99213 OFFICE O/P EST LOW 20 MIN: CPT | Performed by: NURSE PRACTITIONER

## 2023-01-17 NOTE — PROGRESS NOTES
Subjective   Sadiq Alexander is a 47 y.o. male presenting for follow up for migraine headaches. He has been on Ajovy 225 mg every 30 days. This is working well for him. He was prescribed Ubrelvy but his insurance denied coverage. He has not tried Nurtec.     History of Present Illness     Review of Systems   Eyes: Positive for photophobia and visual disturbance. Negative for blurred vision and double vision.   Gastrointestinal: Positive for nausea. Negative for diarrhea and vomiting.   Musculoskeletal: Positive for neck pain and neck stiffness. Negative for back pain.   Neurological: Positive for headache. Negative for dizziness, tremors, seizures, syncope, facial asymmetry, speech difficulty, weakness, light-headedness, numbness, memory problem and confusion.       I have reviewed and confirmed the accuracy of the patient's history: Chief complaint, HPI, ROS, Subjective and Past Family Social History as entered by the MA/LPN/RN. Kaila Hart MA 01/17/23      Objective     Physical Examination:  General Appearance:  Well developed, well nourished, well groomed, alert, and cooperative.  HEENT: Normocephalic.    Neck and Spine: Normal range of motion.  Normal alignment. No mass or tenderness. No bruits.  Cardiac: Regular rate and rhythm. No murmurs.  Peripheral Vasculature: Radial and pedal pulses are equal and symmetric.   Extremities: No edema or deformities. Normal joint ROM.  Skin: No rashes or birth marks.      Neurological examination:   Higher Integrative Function: Oriented to time, place, and person. Normal registration, recall attention span and concentration. Normal language including comprehension, spontaneous speech, repetition, reading, writing, naming and vocabulary. No neglect with normal visual-spatial function and construction. Normal fund of knowledge and higher integrative function.   CN II: Pupils are equal, round and reactive to light. Normal visual acuity and visual fields.   CN III IV VI:  Extraocular movements are full without nystagmus.   CN V: Normal facial sensation and strength of muscles of mastication.   CN VII: Facial movements are symmetric. No weakness.   CN VIII: Auditory acuity is normal.  CN IX & X: Symmetric palatal movement.   CN XI: Sternocleidomastoid and trapezius are normal. No weakness.   CN XII: The tongue is midline. No atrophy or fasciculations.  Motor: Normal muscle strength, bulk and tone in upper and lower extremities. No fasciculations, rigidity, spasticity, or abnormal movements.   Reflexes: 2+ in the upper and lower extremities. Plantar responses are flexor.   Sensation: Normal light touch, pinprick, vibration, temperature, and proprioception in the arms and legs.   Station and Gait: Normal gait and station.   Coordination: Finger to nose test shows no dysmetria. Rapid alternating movements are normal. Heel to shin is normal.           Assessment & Plan   Diagnoses and all orders for this visit:    1. Intractable migraine with aura without status migrainosus (Primary)    Continues to do well on Ajovy. He will continue this. I gave him samples of Ubrelvy to use as well as Nurtec 75 mg as needed. He knows not to use these together. R/B/SE reviewed. Instructions given. If the Nurtec works well we can send in a prescription to see if coverage is better than with Ubrelvy, otherwise he will continue the Ubrelvy samples. He will call with any problems.    F/u 1 year, sooner if needed.

## 2023-01-17 NOTE — LETTER
January 17, 2023     Charly Jackson MD  [address]  [facility] [address]  [place] [id number]    Patient: Sadiq Alexander   YOB: 1975   Date of Visit: 1/17/2023       Dear Dr. Renetta MD:    Thank you for referring Sadiq Alexander to me for evaluation. Below are the relevant portions of my assessment and plan of care.    If you have questions, please do not hesitate to call me. I look forward to following Sadiq along with you.         Sincerely,        Tommy Zimmer, CIERA        CC: No Recipients  Tommy Zimmer, CIERA  01/17/23 1337  Signed  Subjective    Sadiq Alexander is a 47 y.o. male presenting for follow up for migraine headaches. He has been on Ajovy 225 mg every 30 days. This is working well for him. He was prescribed Ubrelvy but his insurance denied coverage. He has not tried Nurtec.     History of Present Illness     Review of Systems   Eyes: Positive for photophobia and visual disturbance. Negative for blurred vision and double vision.   Gastrointestinal: Positive for nausea. Negative for diarrhea and vomiting.   Musculoskeletal: Positive for neck pain and neck stiffness. Negative for back pain.   Neurological: Positive for headache. Negative for dizziness, tremors, seizures, syncope, facial asymmetry, speech difficulty, weakness, light-headedness, numbness, memory problem and confusion.       I have reviewed and confirmed the accuracy of the patient's history: Chief complaint, HPI, ROS, Subjective and Past Family Social History as entered by the MA/TERRYN/RN. Kaila Hart MA 01/17/23      Objective      Physical Examination:  General Appearance:  Well developed, well nourished, well groomed, alert, and cooperative.  HEENT: Normocephalic.    Neck and Spine: Normal range of motion.  Normal alignment. No mass or tenderness. No bruits.  Cardiac: Regular rate and rhythm. No murmurs.  Peripheral Vasculature: Radial and pedal pulses are equal and symmetric.   Extremities: No edema or deformities. Normal joint  ROM.  Skin: No rashes or birth marks.      Neurological examination:   Higher Integrative Function: Oriented to time, place, and person. Normal registration, recall attention span and concentration. Normal language including comprehension, spontaneous speech, repetition, reading, writing, naming and vocabulary. No neglect with normal visual-spatial function and construction. Normal fund of knowledge and higher integrative function.   CN II: Pupils are equal, round and reactive to light. Normal visual acuity and visual fields.   CN III IV VI: Extraocular movements are full without nystagmus.   CN V: Normal facial sensation and strength of muscles of mastication.   CN VII: Facial movements are symmetric. No weakness.   CN VIII: Auditory acuity is normal.  CN IX & X: Symmetric palatal movement.   CN XI: Sternocleidomastoid and trapezius are normal. No weakness.   CN XII: The tongue is midline. No atrophy or fasciculations.  Motor: Normal muscle strength, bulk and tone in upper and lower extremities. No fasciculations, rigidity, spasticity, or abnormal movements.   Reflexes: 2+ in the upper and lower extremities. Plantar responses are flexor.   Sensation: Normal light touch, pinprick, vibration, temperature, and proprioception in the arms and legs.   Station and Gait: Normal gait and station.   Coordination: Finger to nose test shows no dysmetria. Rapid alternating movements are normal. Heel to shin is normal.           Assessment & Plan   Diagnoses and all orders for this visit:    1. Intractable migraine with aura without status migrainosus (Primary)    Continues to do well on Ajovy. He will continue this. I gave him samples of Ubrelvy to use as well as Nurtec 75 mg as needed. He knows not to use these together. R/B/SE reviewed. Instructions given. If the Nurtec works well we can send in a prescription to see if coverage is better than with Ubrelvy, otherwise he will continue the Ubrelvy samples. He will call with any  problems.    F/u 1 year, sooner if needed.

## 2023-01-31 PROCEDURE — S0260 H&P FOR SURGERY: HCPCS | Performed by: ANESTHESIOLOGY

## 2023-02-01 ENCOUNTER — HOSPITAL ENCOUNTER (OUTPATIENT)
Dept: GENERAL RADIOLOGY | Facility: SURGERY CENTER | Age: 48
Setting detail: HOSPITAL OUTPATIENT SURGERY
End: 2023-02-01
Payer: COMMERCIAL

## 2023-02-01 ENCOUNTER — HOSPITAL ENCOUNTER (OUTPATIENT)
Facility: SURGERY CENTER | Age: 48
Setting detail: HOSPITAL OUTPATIENT SURGERY
Discharge: HOME | End: 2023-02-01
Attending: ANESTHESIOLOGY | Admitting: ANESTHESIOLOGY
Payer: COMMERCIAL

## 2023-02-01 VITALS
SYSTOLIC BLOOD PRESSURE: 123 MMHG | RESPIRATION RATE: 16 BRPM | WEIGHT: 196 LBS | BODY MASS INDEX: 29.03 KG/M2 | DIASTOLIC BLOOD PRESSURE: 80 MMHG | TEMPERATURE: 98 F | HEART RATE: 90 BPM | HEIGHT: 69 IN | OXYGEN SATURATION: 94 %

## 2023-02-01 DIAGNOSIS — M47.816 LUMBAR FACET ARTHROPATHY: ICD-10-CM

## 2023-02-01 DIAGNOSIS — Z41.9 SURGERY, ELECTIVE: ICD-10-CM

## 2023-02-01 PROCEDURE — 25010000002 FENTANYL CITRATE (PF) 50 MCG/ML SOLUTION: Performed by: ANESTHESIOLOGY

## 2023-02-01 PROCEDURE — 99152 MOD SED SAME PHYS/QHP 5/>YRS: CPT | Performed by: ANESTHESIOLOGY

## 2023-02-01 PROCEDURE — 25010000002 MIDAZOLAM PER 1 MG: Performed by: ANESTHESIOLOGY

## 2023-02-01 PROCEDURE — 64635 DESTROY LUMB/SAC FACET JNT: CPT | Performed by: ANESTHESIOLOGY

## 2023-02-01 PROCEDURE — 64636 DESTROY L/S FACET JNT ADDL: CPT | Performed by: ANESTHESIOLOGY

## 2023-02-01 PROCEDURE — 76000 FLUOROSCOPY <1 HR PHYS/QHP: CPT

## 2023-02-01 RX ORDER — SODIUM CHLORIDE 0.9 % (FLUSH) 0.9 %
10 SYRINGE (ML) INJECTION EVERY 12 HOURS SCHEDULED
Status: DISCONTINUED | OUTPATIENT
Start: 2023-02-01 | End: 2023-02-01 | Stop reason: HOSPADM

## 2023-02-01 RX ORDER — FENTANYL CITRATE 50 UG/ML
INJECTION, SOLUTION INTRAMUSCULAR; INTRAVENOUS AS NEEDED
Status: DISCONTINUED | OUTPATIENT
Start: 2023-02-01 | End: 2023-02-01 | Stop reason: HOSPADM

## 2023-02-01 RX ORDER — MIDAZOLAM HYDROCHLORIDE 1 MG/ML
INJECTION INTRAMUSCULAR; INTRAVENOUS AS NEEDED
Status: DISCONTINUED | OUTPATIENT
Start: 2023-02-01 | End: 2023-02-01 | Stop reason: HOSPADM

## 2023-02-01 RX ORDER — SODIUM CHLORIDE 0.9 % (FLUSH) 0.9 %
10 SYRINGE (ML) INJECTION AS NEEDED
Status: DISCONTINUED | OUTPATIENT
Start: 2023-02-01 | End: 2023-02-01 | Stop reason: HOSPADM

## 2023-02-01 NOTE — OP NOTE
Bilateral L3-5 Lumbar Medial Branch RADIOFREQUENCY  [facility]      PREOPERATIVE DIAGNOSIS:  Lumbar spondylosis without myelopathy    POSTOPERATIVE DIAGNOSIS:  Lumbar spondylosis without myelopathy    PROCEDURE:   Diagnostic Bilateral Lumbar Medial Branch Nerve thermal radiofrequency lesioning, with fluoroscopy:  L3, L4, and L5 nerves (at the L4 and L5 transverse processes and the sacral alar groove) to thermally treat the innervation to facet joints L4-5 and L5-S1  1. 62636-39 -- Bilateral L/S facet neuro destr., 1st Level  2. [id number] -- Bilateral L/S facet neuro destr., 2nd  Level    PRE-PROCEDURE DISCUSSION WITH PATIENT:    Risks and complications were discussed with the patient prior to starting the procedure and informed consent was obtained.      SURGEON:  Nathaniel Hurst MD    REASON FOR PROCEDURE:    Previous clinically significant therapeutic effect after prior Radiofrequency procedure    SEDATION:  Versed 2mg & Fentanyl 100 mcg IV  TIME OF PROCEDURE:   The intraoperative procedure time after administration of the sedative was 20 minutes.     ANESTHETIC:  Lidocaine 2%  STEROID:  NONE      DESCRIPTON OF PROCEDURE:  After obtaining informed consent, IV access  was obtained in the preoperative area.   The patient was taken to the operating room.  The patient was placed in the prone position with a pillow under the abdomen. All pressure points were well padded.  EKG, blood pressure, and pulse oximeter were monitored.  The patient was monitored and sedated by the RN under my direction. The lumbosacral area was prepped with Chloraprep and draped in a sterile fashion.     Under fluoroscopic guidance the transverse processes of the L4 and L5 vertebrae at the junctions of the superior articular processes were identified on the right.  Also identified was the groove between the ala and the superior articular process of the sacrum on the ipsilateral side.  Skin and subcutaneous tissue were  anesthetized with 1ml of 1% lidocaine above each of these points. Then, radiofrequency probe needles were advanced in this fluoro view to the above junctions.  Aspiration was negative for blood and CSF.  After confirming the position of the needle with fluoroscope in all views, testing was initiated.  First, sensory testing was started on each needle a 1V and 50Hz and slowly decreased until painful pressure stimulation diminished at 0.5V.  Next, motor testing was confirmed to be negative at 3V and 2Hz for any radicular stimulation.  Then 1mL of the local anesthetic was instilled in each needle.  Two minutes elapsed, and during this time a lateral fluoroscopic view was confirmed again to ensure the needles had not advanced nor retracted.  Then, Radiofrequency Lesioning was initiated for 2.5 minutes at 85 degrees Celsius.  Needles were removed intact from each of the areas.     A similar procedure was repeated to address the L3, L4, and L5 nerves on the contralateral side.   Onset of analgesia was noted.  Vital signs remained stable throughout.      ESTIMATED BLOOD LOSS:  <5 mL  SPECIMENS:  none    COMPLICATIONS:   No complications were noted., There was not any evidence of dural puncture.   and The patient did not have any signs of postprocedure numbness nor weakness.    TOLERANCE & DISCHARGE CONDITION:    The patient tolerated the procedure well.  The patient was transported to the recovery area without difficulties.  The patient was discharged to home under the care of family in stable and satisfactory condition.    PLAN OF CARE:  1. The patient was given our standard instruction sheet.  2. The patient will  Return to clinic 6 wks.  3. The patient will resume all medications as per the medication reconciliation sheet.

## 2023-02-01 NOTE — DISCHARGE INSTRUCTIONS
[facility] - Post-procedure Instructions          --  While there are no absolute restrictions, it is recommended that you do not perform strenuous activity today. In the morning, you may resume your level of activity as before your block.    --  If you have a band-aid at your injection site, please remove it later today. Observe the area for any redness, swelling, pus-like drainage, or a temperature over 101°. If any of these symptoms occur, please call your doctor at [phone]. If after office hours, leave a message and the on-call provider will return your call.    --  Ice may be applied to your injection site. It is recommended you avoid direct heat (heating pad; hot tub) for 1-2 days.    --  Call [facility] at [phone] if you experience persistent headache, persistent bleeding from the injection site, or severe pain not relieved by heat or oral medication.    --  Do not make important decisions today.    --  Due to the effects of the block and/or the I.V. Sedation, DO NOT drive or operate hazardous machinery for 12 hours.  Local anesthetics may cause numbness after procedure and precautions must be taken with regards to operating equipment as well as with walking, even if ambulating with assistance of another person or with an assistive device.    --  Do not drink alcohol for 12 hours.    -- You may return to work tomorrow, or as directed by your referring doctor.    --  Occasionally you may notice a slight increase in your pain after the procedure. This should start to improve within the next 24-48 hours. Radiofrequency ablation procedure pain may last 3-4 weeks.    --  It may take as long as 3-4 days before you notice a gradual improvement in your pain and/or other symptoms.    -- You may continue to take your prescribed pain medication as needed.    --  Some normal possible side effects of steroid use could include fluid retention, increased blood sugar, dull headache,  increased sweating, increased appetite, mood swings and flushing.    --  Diabetics are recommended to watch their blood glucose level closely for 24-48 hours after the injection.    --  Must stay in PACU for 20 min upon arrival and prove no leg weakness before being discharged.    --  IN THE EVENT OF A LIFE THREATENING EMERGENCY, (CHEST PAIN, BREATHING DIFFICULTIES, PARALYSIS…) YOU SHOULD GO TO YOUR NEAREST EMERGENCY ROOM.    --  You should be contacted by our office within 2-3 days to schedule follow up or next appointment date.  If not contacted within 7 days, please call the office at [phone]

## 2023-02-01 NOTE — H&P
Brief Pre-procedural / Pre-operative H&P        -----    Pertinent Diagnosis:   Lumbar facet arthropathy.  Lumbar spondylosis    Proposed Procedure: Lumbar medial branch radiofrequency ablation anticipated the L3 and the L4 and L5 medial branches bilaterally      Subjective   Sadiq Alexander is a 47 y.o. male  who presents for intervention.  He has a history of back pain.      History of Present Illness     Low back pain with axial elements of the pain.  Very significant relief from multiple radiofrequency ablation maneuvers.  Recurrent pain.  With previous greater than 50% for greater than 6 months relief noted repeat ablation is reasonable and indicated.    -------    The following portions of the patient's history were reviewed and updated as appropriate: allergies, current medications, past family history, past medical history, past social history, past surgical history and problem list.    No Known Allergies    No current facility-administered medications for this encounter.    No current facility-administered medications on file prior to encounter.     Current Outpatient Medications on File Prior to Encounter   Medication Sig Dispense Refill   • cyclobenzaprine (FLEXERIL) 10 MG tablet TAKE 1 TABLET BY MOUTH THREE TIMES A DAY (Patient taking differently: Take 10 mg by mouth 3 (Three) Times a Day As Needed for Muscle Spasms.) 90 tablet 3   • diclofenac (VOLTAREN) 50 MG EC tablet TAKE 1 TABLET BY MOUTH 2 TIMES A DAY AS NEEDED FOR PAIN 180 tablet 0   • Fremanezumab-vfrm (Ajovy) 225 MG/1.5ML solution prefilled syringe Inject 1.5 mL under the skin into the appropriate area as directed Every 30 (Thirty) Days. 4.5 mL 3   • pantoprazole (PROTONIX) 40 MG EC tablet TAKE 1 TABLET BY MOUTH ONCE DAILY 90 tablet 3   • rosuvastatin (CRESTOR) 10 MG tablet TAKE 1 TABLET BY MOUTH ONCE DAILY 90 tablet 3   • [DISCONTINUED] HYDROcodone-acetaminophen (NORCO) 5-325 MG per tablet Take 1 tab by mouth every 6 hours as needed for pain 15  tablet 0   • [DISCONTINUED] predniSONE (DELTASONE) 10 MG tablet Take 4 tablets by mouth daily for 5 days then 2 tablets by mouth daily for 3 days then 1 tablet by mouth daily for 2 days 28 tablet 0       Patient Active Problem List   Diagnosis   • Colon polyp   • Internal hemorrhoids   • Migraines   • Nicotine dependence   • Anxiety disorder   • Hyperlipidemia   • Irritable bowel syndrome with both constipation and diarrhea   • GERD with esophagitis   • Restless legs syndrome (RLS)   • Degenerative lumbar disc   • Synovial cyst of lumbar facet joint   • Chronic back pain   • Lumbar facet arthropathy   • Muscle spasm   • Chronic midline back pain       Past Medical History:   Diagnosis Date   • Abnormal MRI, knee 02/04/2021    LRFT KNEE, MRI DONE AT [facility]   • ADHD    • Anxiety    • Arthritis    • Blood in stool    • Bowel incontinence    • Chronic midline low back pain without sciatica    • Colon polyps     FOLLOWED BY DR. DOMINIQUE ALLEN   • Constipation    • DDD (degenerative disc disease), lumbar    • Environmental allergies    • Fatigue    • GERD (gastroesophageal reflux disease)    • Hemorrhoids 05/24/2017   • Hiatal hernia 11/26/2018    Dr. Dominique Allen.   • History of traumatic head injury    • Hyperlipidemia    • IBS (irritable bowel syndrome)     IBS WITH BOTH CONSTIPATION & DIARRHEA.   • Influenza 01/07/2014   • Ligamentous laxity of left knee 01/2021   • Low back pain 10/10/2019    [facility], Right sided low back pain without sciatica.   • Lumbar facet arthropathy    • Lumbar paraspinal muscle spasm    • Lumbar spondylosis    • Lumbar strain 09/2019   • Lumbar strain 08/04/2018    SEEN AT [facility] ER   • Migraines    • Multiple atypical skin moles     See's derm and has family history of melanoma.   • NSAID long-term use    • Osteoarthritis of lumbar spine    • Perianal venous thrombosis 05/2017   • Right ankle sprain 12/22/2013   • RLS (restless legs syndrome)    • Shoulder impingement, right  01/26/2018    FOLLOWED BY DR. PANDA ENRIQUEZ   • Sprain and strain of left ankle 12/22/2013    [facility], ankle injury.   • Strain of right knee 08/08/2016    FOLLOWED BY DR. PANDA ENRIQUEZ   • Synovial cyst of lumbar facet joint    • Thrombosed hemorrhoids 03/2022   • Thumb laceration, left, initial encounter 01/08/2017    [facility], cut it while cooking.       Past Surgical History:   Procedure Laterality Date   • CHOLECYSTECTOMY WITH INTRAOPERATIVE CHOLANGIOGRAM N/A 2/5/2019    Procedure: CHOLECYSTECTOMY LAPAROSCOPIC INTRAOPERATIVE CHOLANGIOGRAM;  Surgeon: Reji Hoffman MD;  Location: [facility] MAIN OR;  Service: General   • COLONOSCOPY N/A 03/11/2015    Two 4-6 mm polyps at the recto-sigmoid and in the mid ascending colon (PATH: tubulovillous adenoma w/ low-grade dysplasia & hyperplastic colon polyp at 20 cm), internal hemorrhoids; Dr. Chato Allen   • COLONOSCOPY N/A 09/13/2018    Tortuous colon, internal hemorrhoids, Dr. Chato Allen.   • ENDOSCOPY N/A 01/07/2019    IRREGULAR Z LINE AT 41CM, ACTIVE DUODENITIS, MILD ACTIVE ESOPHAGITIS, REACTIVE GASTROPATHY, DR. CHATO ALLEN AT [facility]   • HEMORRHOID BANDING N/A    • MEDIAL BRANCH BLOCK Bilateral 01/15/2020    medial branch block (Bilateral lumbar 3 - Lumbar 5 synovial cyst), Dr. Nathaniel Hurst.   • NERVE SURGERY Bilateral 3/16/2021    Procedure: Bilateral L3-L5 RADIOFREQUENCY ABLATION LUMBAR;  Surgeon: Nathaniel Hurst MD;  Location: [facility] MAIN OR;  Service: Pain Management;  Laterality: Bilateral;   • RADIOFREQUENCY ABLATION Bilateral 05/27/2020    L3-L5 Radiofrequency ablation lumbar, Surgeon Nathaniel Hurst.   • RADIOFREQUENCY ABLATION Bilateral 11/18/2021    Procedure: Bilateral L3-L5 RADIOFREQUENCY ABLATION LUMBAR;  Surgeon: Nathaniel Hurst MD;  Location: [facility] MAIN OR;  Service: Pain Management;  Laterality: Bilateral;   • RADIOFREQUENCY ABLATION Bilateral 6/16/2022    Procedure: Bilateral L3-L5 RADIOFREQUENCY  ABLATION LUMBAR;  Surgeon: Nathaniel Hurst MD;  Location: [facility] MAIN OR;  Service: Pain Management;  Laterality: Bilateral;   • REFRACTIVE SURGERY Bilateral 2018    LASIK   • SHOULDER ACROMIOPLASTY Right 02/08/2015    WITH DEBRIDEMENT OF LABRUM, DR. PANDA ENRIQUEZ   • SHOULDER ARTHROSCOPY W/ LABRAL REPAIR Left 06/2005    DR. PANDA ENRIQUEZ   • STEROID INJECTION KNEE Left 02/10/2021    DR. VEDA LUIS   • THROMBECTOMY N/A 05/24/2017    Procedure; External hemorrhoid thrombectomy, DR. BAMBI DONALD       Family History   Problem Relation Age of Onset   • No Known Problems Mother    • Melanoma Father    • Depression Father    • No Known Problems Sister    • Parkinsonism Maternal Grandmother    • Arthritis Maternal Grandmother    • Migraine headaches Maternal Grandmother    • Lung cancer Maternal Grandfather         smoker   • Andrew's disease Maternal Grandfather    • Cancer Maternal Grandfather    • Migraine headaches Maternal Grandfather    • Lung cancer Paternal Grandmother         smoker   • Cancer Paternal Grandmother    • Migraine headaches Paternal Grandmother    • Cancer Maternal Aunt         spine   • Lung cancer Paternal Aunt         smoker   • Cancer Paternal Aunt    • Heart attack Paternal Uncle 55   • Colon cancer Neg Hx    • Prostate cancer Neg Hx        Social History     Socioeconomic History   • Marital status:      Spouse name: Doris   • Number of children: 3   Tobacco Use   • Smoking status: Some Days     Packs/day: 0.75     Years: 0.00     Pack years: 0.00     Types: Cigarettes     Start date: 3/8/2019     Last attempt to quit: 09/2019     Years since quitting: 3.4   • Smokeless tobacco: Former     Types: Chew     Quit date: 3/8/2019   • Tobacco comments:     smokes about 1 pack / week   Vaping Use   • Vaping Use: Never used   Substance and Sexual Activity   • Alcohol use: Yes     Alcohol/week: 3.0 standard drinks     Types: 1 Glasses of wine, 1 Cans of beer, 1 Shots of liquor per week      "Comment: 4 days (1-2 weeks)   • Drug use: Not Currently     Comment: used marijuana in the past in college   • Sexual activity: Yes     Partners: Female       -------       Review of Systems  No Fever, No Chills, No ear pain, No sinus pressure or drainage, No eye pain or drainage, No cough, No SOB, No chest tightness nor chest pain, no palpitations.          Vitals:    01/30/23 1227 02/01/23 1406   BP:  150/80   BP Location:  Left arm   Patient Position:  Lying   Pulse:  81   Resp:  16   Temp:  98.2 °F (36.8 °C)   TempSrc:  Temporal   SpO2:  95%   Weight: 88.9 kg (196 lb)    Height: 175.3 cm (69\")          Objective   Physical Exam  VSS, NNR, NCAT, NMNA, NRD, AAOx3.      -------    Assessment & Plan:  - as noted above, the stated intervention is indicated  - Follow-up plan will be noted in the operative report        Follow-up in about 6 weeks, March 15      EMR Dragon/Transcription disclaimer:   Typed items in this encounter note may have been created by electronic transcription/translation software which converts spoken language to printed text. The electronic translation of spoken language may permit erroneous, or at times, nonsensical words or phrases to be inadvertently transcribed; Although I have reviewed the note for such errors, some may still exist.      "

## 2023-03-15 ENCOUNTER — OFFICE VISIT (OUTPATIENT)
Dept: PAIN MEDICINE | Facility: CLINIC | Age: 48
End: 2023-03-15
Payer: COMMERCIAL

## 2023-03-15 VITALS
DIASTOLIC BLOOD PRESSURE: 79 MMHG | BODY MASS INDEX: 29.21 KG/M2 | HEIGHT: 69 IN | OXYGEN SATURATION: 96 % | RESPIRATION RATE: 12 BRPM | SYSTOLIC BLOOD PRESSURE: 121 MMHG | WEIGHT: 197.2 LBS | TEMPERATURE: 97.5 F | HEART RATE: 82 BPM

## 2023-03-15 DIAGNOSIS — G89.29 CHRONIC MIDLINE LOW BACK PAIN WITHOUT SCIATICA: ICD-10-CM

## 2023-03-15 DIAGNOSIS — G89.29 OTHER CHRONIC PAIN: ICD-10-CM

## 2023-03-15 DIAGNOSIS — M47.816 LUMBAR FACET ARTHROPATHY: Primary | ICD-10-CM

## 2023-03-15 DIAGNOSIS — M54.50 CHRONIC MIDLINE LOW BACK PAIN WITHOUT SCIATICA: ICD-10-CM

## 2023-03-15 PROCEDURE — 99213 OFFICE O/P EST LOW 20 MIN: CPT | Performed by: NURSE PRACTITIONER

## 2023-03-15 NOTE — PROGRESS NOTES
CHIEF COMPLAINT  Procedure follow up Bilateral L3-L5 RADIOFREQUENCY ABLATION LUMBAR 2/01/2023  Patient reports 75% pain relief with ongoing effect.    Subjective   Sadiq Alexander is a 47 y.o. male  who presents to the office for follow-up of procedure.  He completed a Bilateral L3-L5 RFA   on  2/1/2023 performed by Dr. Hurst for management of back pain. Patient reports 75% ONGOING relief from the procedure.     Today his pain is 2/10VAS in severity. He has not needed his flexeril since having the ablation. He does continues with diclofenac 50 mg 1-2/day. He has a history of GERD and is maintained on pantoprazole. He states that this started when he had his gallbladder removed and he has seen no worsening in these symptoms while being on diclofenac. Patient cautioned over the potential side effects of diclofenac including indigestion, GI upset, and bleeding ulcer. He denies any black/tarry stools. He wishes to continue diclofenac at this time.     Procedure list (last 2 years):  6/16/2022-bilateral L3-L5 RFA-80% relief x ~5.5 months, relief is now beginning to wane, but has not fully returned to baseline.   11/18/2021-bilateral L3-L5 RFA-80 to 90% relief times ~5.5-6 months.   3/16/2021-bilateral L3-L5 RFA-80% relief x6 months  5/27/2020-bilateral L3-L5 RFA-60% relief    Back Pain  This is a chronic problem. The current episode started more than 1 year ago. The problem occurs constantly. Progression since onset: improved since last office visit. The pain is present in the lumbar spine. The quality of the pain is described as aching, burning and stabbing. The pain does not radiate. The pain is at a severity of 2/10. Worse during: worsens as day progresses. The symptoms are aggravated by bending, position, standing and twisting. Associated symptoms include bowel incontinence. Pertinent negatives include no abdominal pain, bladder incontinence, chest pain, dysuria, fever, headaches, numbness or weakness. He has tried  "chiropractic manipulation, heat, home exercises, analgesics, bed rest, ice, muscle relaxant, NSAIDs and walking (PT, MBB-significant diagnostic relief) for the symptoms. The treatment provided moderate (RFL) relief.      PEG Assessment   What number best describes your pain on average in the past week?3  What number best describes how, during the past week, pain has interfered with your enjoyment of life?0  What number best describes how, during the past week, pain has interfered with your general activity?  0    The following portions of the patient's history were reviewed and updated as appropriate: allergies, current medications, past family history, past medical history, past social history, past surgical history and problem list.    Review of Systems   Constitutional: Negative for activity change, fatigue and fever.   HENT: Negative for congestion.    Eyes: Negative for visual disturbance.   Respiratory: Negative for cough and chest tightness.    Cardiovascular: Negative for chest pain.   Gastrointestinal: Positive for bowel incontinence. Negative for abdominal pain, constipation and diarrhea.   Genitourinary: Negative for bladder incontinence, difficulty urinating and dysuria.   Musculoskeletal: Positive for back pain.   Neurological: Negative for dizziness, weakness, light-headedness, numbness and headaches.   Psychiatric/Behavioral: Negative for agitation, sleep disturbance and suicidal ideas. The patient is not nervous/anxious.      --  The aforementioned information the Chief Complaint section and above subjective data including any HPI data, and also the Review of Systems data, has been personally reviewed and affirmed.  --     Vitals:    03/15/23 1408   BP: 121/79   BP Location: Left arm   Patient Position: Sitting   Cuff Size: Adult   Pulse: 82   Resp: 12   Temp: 97.5 °F (36.4 °C)   TempSrc: Temporal   SpO2: 96%   Weight: 89.4 kg (197 lb 3.2 oz)   Height: 175.3 cm (69\")   PainSc:   2     Objective "   Physical Exam  Vitals and nursing note reviewed.   Constitutional:       Appearance: Normal appearance. He is well-developed.   Eyes:      General: Lids are normal.   Cardiovascular:      Rate and Rhythm: Normal rate.   Pulmonary:      Effort: Pulmonary effort is normal.   Musculoskeletal:      Lumbar back: Decreased range of motion.   Neurological:      Mental Status: He is alert and oriented to person, place, and time.   Psychiatric:         Attention and Perception: Attention normal.         Mood and Affect: Mood normal.         Speech: Speech normal.         Behavior: Behavior normal.         Judgment: Judgment normal.       Assessment & Plan   Diagnoses and all orders for this visit:    1. Lumbar facet arthropathy (Primary)    2. Chronic midline low back pain without sciatica    3. Other chronic pain      --- May repeat Lumbar RFA every 6 months PRN  --- Continue diclofenac, I do recommend limiting this as much as possible.   --- Follow-up 4 months or sooner if needed      Dictated utilizing Dragon dictation.      Patient remained masked during entire encounter. No cough present. I donned a mask and eye protection throughout entire visit. Prior to donning mask and eye protection, hand hygiene was performed, as well as when it was doffed.  I was closer than 6 feet, but not for an extended period of time. No obvious exposure to any bodily fluids.

## 2023-04-11 RX ORDER — FREMANEZUMAB-VFRM 225 MG/1.5ML
1.5 INJECTION SUBCUTANEOUS
Qty: 4.5 ML | Refills: 3 | Status: SHIPPED | OUTPATIENT
Start: 2023-04-11

## 2023-05-31 ENCOUNTER — OFFICE VISIT (OUTPATIENT)
Dept: INTERNAL MEDICINE | Age: 48
End: 2023-05-31

## 2023-05-31 VITALS
OXYGEN SATURATION: 97 % | HEIGHT: 69 IN | DIASTOLIC BLOOD PRESSURE: 80 MMHG | TEMPERATURE: 97.8 F | WEIGHT: 202 LBS | BODY MASS INDEX: 29.92 KG/M2 | SYSTOLIC BLOOD PRESSURE: 120 MMHG | HEART RATE: 87 BPM

## 2023-05-31 DIAGNOSIS — E78.5 HYPERLIPIDEMIA, UNSPECIFIED HYPERLIPIDEMIA TYPE: Chronic | ICD-10-CM

## 2023-05-31 DIAGNOSIS — K21.00 GASTROESOPHAGEAL REFLUX DISEASE WITH ESOPHAGITIS WITHOUT HEMORRHAGE: Primary | ICD-10-CM

## 2023-05-31 DIAGNOSIS — D72.829 LEUKOCYTOSIS, UNSPECIFIED TYPE: ICD-10-CM

## 2023-05-31 DIAGNOSIS — R73.03 PREDIABETES: Chronic | ICD-10-CM

## 2023-05-31 DIAGNOSIS — Z51.81 THERAPEUTIC DRUG MONITORING: ICD-10-CM

## 2023-05-31 RX ORDER — PANTOPRAZOLE SODIUM 40 MG/1
40 TABLET, DELAYED RELEASE ORAL DAILY
Qty: 90 TABLET | Refills: 3 | Status: SHIPPED | OUTPATIENT
Start: 2023-05-31

## 2023-05-31 RX ORDER — ROSUVASTATIN CALCIUM 10 MG/1
10 TABLET, COATED ORAL DAILY
Qty: 90 TABLET | Refills: 3 | Status: SHIPPED | OUTPATIENT
Start: 2023-05-31

## 2023-05-31 NOTE — PROGRESS NOTES
I N T E R N A L  M E D I C I N E    J U N O H  K I M,  M D      ENCOUNTER DATE:  05/31/2023    Sadiq HERNANDES Alexander / 47 y.o. / male    CHIEF COMPLAINT / REASON FOR OFFICE VISIT     Hyperlipidemia and Heartburn      ASSESSMENT & PLAN     Problem List Items Addressed This Visit        High    Hyperlipidemia (Chronic)    Overview     Continue rosuvastatin 10 mg.          Relevant Medications    rosuvastatin (CRESTOR) 10 MG tablet       Medium    GERD with esophagitis - Primary (Chronic)    Overview     Continue pantoprazole 40 mg qd.          Relevant Medications    pantoprazole (PROTONIX) 40 MG EC tablet    Prediabetes (Chronic)    Current Assessment & Plan     Check A1c today. Consider metformin if A1c is higher.     Lab Results   Component Value Date    GLUCOSE 94 08/17/2022    GLUCOSE 99 02/16/2022    GLUCOSE 95 01/08/2021     Lab Results   Component Value Date    HGBA1C 6.0 (H) 08/17/2022    HGBA1C 5.51 06/20/2018            Other Visit Diagnoses     Leukocytosis, unspecified type        Relevant Orders    Hemoglobin A1c    CBC & Differential    Therapeutic drug monitoring        Relevant Orders    Comprehensive Metabolic Panel    CBC & Differential        Orders Placed This Encounter   Procedures   • COVID-19 Bivalent (Pfizer) 12+yrs   • Hemoglobin A1c   • Comprehensive Metabolic Panel   • CBC & Differential     New Medications Ordered This Visit   Medications   • pantoprazole (PROTONIX) 40 MG EC tablet     Sig: Take 1 tablet by mouth Daily.     Dispense:  90 tablet     Refill:  3   • rosuvastatin (CRESTOR) 10 MG tablet     Sig: Take 1 tablet by mouth Daily.     Dispense:  90 tablet     Refill:  3       SUMMARY/DISCUSSION  •       Next Appointment with me: Visit date not found    Return in about 6 months (around 11/30/2023) for Reassess chronic medical problems.      VITAL SIGNS     Vitals:    05/31/23 1434   BP: 120/80   Pulse: 87   Temp: 97.8 °F (36.6 °C)   SpO2: 97%   Weight: 91.6 kg (202 lb)   Height: 175.3 cm  "(69\")       BP Readings from Last 3 Encounters:   05/31/23 120/80   03/15/23 121/79   02/01/23 123/80     Wt Readings from Last 3 Encounters:   05/31/23 91.6 kg (202 lb)   03/15/23 89.4 kg (197 lb 3.2 oz)   01/30/23 88.9 kg (196 lb)     Body mass index is 29.83 kg/m².    Blood pressure readings recorded on patient flowsheet:       View : No data to display.                  MEDICATIONS AT THE TIME OF OFFICE VISIT     Current Outpatient Medications on File Prior to Visit   Medication Sig   • cyclobenzaprine (FLEXERIL) 10 MG tablet TAKE 1 TABLET BY MOUTH THREE TIMES A DAY (Patient taking differently: Take 1 tablet by mouth 3 (Three) Times a Day As Needed for Muscle Spasms.)   • diclofenac (VOLTAREN) 50 MG EC tablet TAKE 1 TABLET BY MOUTH 2 TIMES A DAY AS NEEDED FOR PAIN   • Fremanezumab-vfrm (Ajovy) 225 MG/1.5ML solution prefilled syringe Inject 1.5 mL under the skin into the appropriate area as directed Every 30 (Thirty) Days.   • [DISCONTINUED] pantoprazole (PROTONIX) 40 MG EC tablet TAKE 1 TABLET BY MOUTH ONCE DAILY   • [DISCONTINUED] rosuvastatin (CRESTOR) 10 MG tablet TAKE 1 TABLET BY MOUTH ONCE DAILY     No current facility-administered medications on file prior to visit.          HISTORY OF PRESENT ILLNESS     Had significant heartburn symptoms when he ran out of pantoprazole. On rosuvastatin for hyperlipidemia. A1c was elevated at 6.0 previously. White blood count was noted to be elevated previously without symptoms or signs of infection.  Takes diclofenac for chronic back pain and shoulder pain.     Patient Care Team:  Charly Jackson MD as PCP - General (Internal Medicine)  Nathaniel Hurst MD as Consulting Physician (Pain Medicine)  Tommy Zimmer APRN as Nurse Practitioner (Neurology)  Aj Bone MD as Consulting Physician (Orthopedic Surgery)  Chato Allen MD as Consulting Physician (Gastroenterology)  Prabha Guerrero APRN as Nurse Practitioner (Nurse Practitioner)    REVIEW OF SYSTEMS "     Review of Systems   No dyspepsia, dysphagia, jose luis satiety, melena or blood in stool     PHYSICAL EXAMINATION     Physical Exam  General: No acute distress  Psych: Normal thought and judgment   Cardiovascular Rate: normal. Rhythm: regular. Heart sounds: normal   Pulm/Chest: Effort normal, breath sounds normal.       REVIEWED DATA     Labs:     Lab Results   Component Value Date     08/17/2022    K 3.8 08/17/2022    CALCIUM 9.3 08/17/2022    AST 15 08/17/2022    ALT 25 08/17/2022    BUN 11 08/17/2022    CREATININE 1.21 08/17/2022    CREATININE 1.28 (H) 02/16/2022    CREATININE 1.22 01/08/2021    EGFRIFNONA 67 02/16/2022    EGFRIFAFRI 78 02/16/2022       Lab Results   Component Value Date    HGBA1C 6.0 (H) 08/17/2022    HGBA1C 5.51 06/20/2018       Lab Results   Component Value Date     (H) 08/17/2022     (H) 02/16/2022    LDL 99 08/12/2019    HDL 43 08/17/2022    HDL 36 (L) 02/16/2022    TRIG 105 08/17/2022    TRIG 159 (H) 02/16/2022       Lab Results   Component Value Date    TSH 1.380 08/17/2022    TSH 0.867 06/20/2018    FREET4 1.29 08/17/2022    FREET4 1.10 06/20/2018       Lab Results   Component Value Date    WBC 15.3 (H) 08/17/2022    HGB 12.7 (L) 08/17/2022     08/17/2022       No results found for: MALBCRERATIO         Imaging:           Medical Tests:           Summary of old records / correspondence / consultant report:           Request outside records:

## 2023-05-31 NOTE — ASSESSMENT & PLAN NOTE
Check A1c today. Consider metformin if A1c is higher.     Lab Results   Component Value Date    GLUCOSE 94 08/17/2022    GLUCOSE 99 02/16/2022    GLUCOSE 95 01/08/2021     Lab Results   Component Value Date    HGBA1C 6.0 (H) 08/17/2022    HGBA1C 5.51 06/20/2018

## 2023-06-01 LAB
ALBUMIN SERPL-MCNC: 4.8 G/DL (ref 3.5–5.2)
ALBUMIN/GLOB SERPL: 2.5 G/DL
ALP SERPL-CCNC: 85 U/L (ref 39–117)
ALT SERPL-CCNC: 34 U/L (ref 1–41)
AST SERPL-CCNC: 21 U/L (ref 1–40)
BASOPHILS # BLD AUTO: 0.04 10*3/MM3 (ref 0–0.2)
BASOPHILS NFR BLD AUTO: 0.5 % (ref 0–1.5)
BILIRUB SERPL-MCNC: 0.2 MG/DL (ref 0–1.2)
BUN SERPL-MCNC: 10 MG/DL (ref 6–20)
BUN/CREAT SERPL: 9 (ref 7–25)
CALCIUM SERPL-MCNC: 10.2 MG/DL (ref 8.6–10.5)
CHLORIDE SERPL-SCNC: 104 MMOL/L (ref 98–107)
CO2 SERPL-SCNC: 23.1 MMOL/L (ref 22–29)
CREAT SERPL-MCNC: 1.11 MG/DL (ref 0.76–1.27)
EGFRCR SERPLBLD CKD-EPI 2021: 82.9 ML/MIN/1.73
EOSINOPHIL # BLD AUTO: 0.16 10*3/MM3 (ref 0–0.4)
EOSINOPHIL NFR BLD AUTO: 1.9 % (ref 0.3–6.2)
ERYTHROCYTE [DISTWIDTH] IN BLOOD BY AUTOMATED COUNT: 13.6 % (ref 12.3–15.4)
GLOBULIN SER CALC-MCNC: 1.9 GM/DL
GLUCOSE SERPL-MCNC: 92 MG/DL (ref 65–99)
HBA1C MFR BLD: 5.7 % (ref 4.8–5.6)
HCT VFR BLD AUTO: 41.5 % (ref 37.5–51)
HGB BLD-MCNC: 14.3 G/DL (ref 13–17.7)
IMM GRANULOCYTES # BLD AUTO: 0.01 10*3/MM3 (ref 0–0.05)
IMM GRANULOCYTES NFR BLD AUTO: 0.1 % (ref 0–0.5)
LYMPHOCYTES # BLD AUTO: 3.75 10*3/MM3 (ref 0.7–3.1)
LYMPHOCYTES NFR BLD AUTO: 44.7 % (ref 19.6–45.3)
MCH RBC QN AUTO: 30.9 PG (ref 26.6–33)
MCHC RBC AUTO-ENTMCNC: 34.5 G/DL (ref 31.5–35.7)
MCV RBC AUTO: 89.6 FL (ref 79–97)
MONOCYTES # BLD AUTO: 0.61 10*3/MM3 (ref 0.1–0.9)
MONOCYTES NFR BLD AUTO: 7.3 % (ref 5–12)
NEUTROPHILS # BLD AUTO: 3.82 10*3/MM3 (ref 1.7–7)
NEUTROPHILS NFR BLD AUTO: 45.5 % (ref 42.7–76)
NRBC BLD AUTO-RTO: 0 /100 WBC (ref 0–0.2)
PLATELET # BLD AUTO: 280 10*3/MM3 (ref 140–450)
POTASSIUM SERPL-SCNC: 4.3 MMOL/L (ref 3.5–5.2)
PROT SERPL-MCNC: 6.7 G/DL (ref 6–8.5)
RBC # BLD AUTO: 4.63 10*6/MM3 (ref 4.14–5.8)
SODIUM SERPL-SCNC: 141 MMOL/L (ref 136–145)
WBC # BLD AUTO: 8.39 10*3/MM3 (ref 3.4–10.8)

## 2023-06-05 ENCOUNTER — OFFICE VISIT (OUTPATIENT)
Dept: ORTHOPEDIC SURGERY | Facility: CLINIC | Age: 48
End: 2023-06-05
Payer: COMMERCIAL

## 2023-06-05 VITALS — TEMPERATURE: 97.5 F | BODY MASS INDEX: 30.27 KG/M2 | HEIGHT: 69 IN | WEIGHT: 204.4 LBS

## 2023-06-05 DIAGNOSIS — M25.562 PAIN IN BOTH KNEES, UNSPECIFIED CHRONICITY: Primary | ICD-10-CM

## 2023-06-05 DIAGNOSIS — M25.561 PAIN IN BOTH KNEES, UNSPECIFIED CHRONICITY: Primary | ICD-10-CM

## 2023-06-05 DIAGNOSIS — M17.0 PRIMARY OSTEOARTHRITIS OF BOTH KNEES: ICD-10-CM

## 2023-06-05 RX ORDER — METHYLPREDNISOLONE ACETATE 80 MG/ML
80 INJECTION, SUSPENSION INTRA-ARTICULAR; INTRALESIONAL; INTRAMUSCULAR; SOFT TISSUE
Status: COMPLETED | OUTPATIENT
Start: 2023-06-05 | End: 2023-06-05

## 2023-06-05 RX ADMIN — METHYLPREDNISOLONE ACETATE 80 MG: 80 INJECTION, SUSPENSION INTRA-ARTICULAR; INTRALESIONAL; INTRAMUSCULAR; SOFT TISSUE at 09:30

## 2023-06-05 NOTE — PROGRESS NOTES
Patient Name: Sadiq Alexander   YOB: 1975  Referring Primary Care Physician: Charly Jackson MD  BMI: Body mass index is 30.18 kg/m².    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 He has had chronic back pain after a back fracture and takes baclofen Flexeril and diclofenac. He had an ablation in his spine that helped at first but now is bothering him. He has gained a few pounds he is try to get active but the knee bothers him he did do some physical therapy and tries to do it on his own and he is a .     HPI:     Sadiq Alexander is a 47 y.o. male who presents today for evaluation of   Chief Complaint   Patient presents with    Left Knee - Follow-up, Pain    Right Knee - Follow-up, Pain   .  Says the knees are bothering him and they get a little stiff but he has up to 4 out of 10 pain.  Bothers him more on the left than the right.  Gets stiff they get painful and bother him with activity      Subjective   Medications:   Home Medications:  Current Outpatient Medications on File Prior to Visit   Medication Sig    cyclobenzaprine (FLEXERIL) 10 MG tablet TAKE 1 TABLET BY MOUTH THREE TIMES A DAY (Patient taking differently: Take 1 tablet by mouth 3 (Three) Times a Day As Needed for Muscle Spasms.)    diclofenac (VOLTAREN) 50 MG EC tablet TAKE 1 TABLET BY MOUTH 2 TIMES A DAY AS NEEDED FOR PAIN    Fremanezumab-vfrm (Ajovy) 225 MG/1.5ML solution prefilled syringe Inject 1.5 mL under the skin into the appropriate area as directed Every 30 (Thirty) Days.    pantoprazole (PROTONIX) 40 MG EC tablet Take 1 tablet by mouth Daily.    rosuvastatin (CRESTOR) 10 MG tablet Take 1 tablet by mouth Daily.     No current facility-administered medications on file prior to visit.     Current Medications:  Scheduled Meds:  Continuous Infusions:No current facility-administered  medications for this visit.    PRN Meds:.    I have reviewed the patient's medical history in detail and updated the computerized patient record.  Review and summarization of old records includes:    Past Medical History:   Diagnosis Date    Abnormal MRI, knee 02/04/2021    LRFT KNEE, MRI DONE AT [facility]    ADHD     Anxiety     Arthritis     Arthritis of back     Blood in stool     Bowel incontinence     Chronic midline low back pain without sciatica     Colon polyps     FOLLOWED BY DR. DOMINIQUE ALLEN    Constipation     DDD (degenerative disc disease), lumbar     Dislocation, shoulder     Environmental allergies     Fatigue     Frozen shoulder     GERD (gastroesophageal reflux disease)     Hemorrhoids 05/24/2017    Hiatal hernia 11/26/2018    Dr. Dominique Allen.    History of traumatic head injury     Hyperlipidemia     IBS (irritable bowel syndrome)     IBS WITH BOTH CONSTIPATION & DIARRHEA.    Influenza 01/07/2014    Knee swelling     Ligamentous laxity of left knee 01/2021    Low back pain 10/10/2019    [facility], Right sided low back pain without sciatica.    Lumbar facet arthropathy     Lumbar paraspinal muscle spasm     Lumbar spondylosis     Lumbar strain 09/2019    Lumbar strain 08/04/2018    SEEN AT [facility] ER    Lumbosacral disc disease     Migraines     Multiple atypical skin moles     See's derm and has family history of melanoma.    NSAID long-term use     Osteoarthritis of lumbar spine     Perianal venous thrombosis 05/2017    Periarthritis of shoulder     Right ankle sprain 12/22/2013    RLS (restless legs syndrome)     Rotator cuff syndrome     Shoulder impingement, right 01/26/2018    FOLLOWED BY DR. PANDA ENRIQUEZ    Sprain and strain of left ankle 12/22/2013    [facility], ankle injury.    Strain of right knee 08/08/2016    FOLLOWED BY DR. PANDA ENRIQUEZ    Synovial cyst of lumbar facet joint     Tear of meniscus of knee     Thrombosed hemorrhoids 03/2022    Thumb laceration, left,  initial encounter 01/08/2017    [facility] KEE, cut it while cooking.        Past Surgical History:   Procedure Laterality Date    CHOLECYSTECTOMY WITH INTRAOPERATIVE CHOLANGIOGRAM N/A 02/05/2019    Procedure: CHOLECYSTECTOMY LAPAROSCOPIC INTRAOPERATIVE CHOLANGIOGRAM;  Surgeon: Reji Hoffman MD;  Location: [facility] MAIN OR;  Service: General    COLONOSCOPY N/A 03/11/2015    Two 4-6 mm polyps at the recto-sigmoid and in the mid ascending colon (PATH: tubulovillous adenoma w/ low-grade dysplasia & hyperplastic colon polyp at 20 cm), internal hemorrhoids; Dr. Chato Allen    COLONOSCOPY N/A 09/13/2018    Tortuous colon, internal hemorrhoids, Dr. Chato Allen.    ENDOSCOPY N/A 01/07/2019    IRREGULAR Z LINE AT 41CM, ACTIVE DUODENITIS, MILD ACTIVE ESOPHAGITIS, REACTIVE GASTROPATHY, DR. CHATO ALLEN AT [facility]    HEMORRHOID BANDING N/A     MEDIAL BRANCH BLOCK Bilateral 01/15/2020    medial branch block (Bilateral lumbar 3 - Lumbar 5 synovial cyst), Dr. Nathaniel Hurst.    NERVE SURGERY Bilateral 03/16/2021    Procedure: Bilateral L3-L5 RADIOFREQUENCY ABLATION LUMBAR;  Surgeon: Nathaniel Hurst MD;  [place] [facility] [place];  Service: Pain Management;  Laterality: Bilateral;    RADIOFREQUENCY ABLATION Bilateral 05/27/2020    L3-L5 Radiofrequency ablation lumbar, Surgeon Nathaniel Hurst.    RADIOFREQUENCY ABLATION Bilateral 11/18/2021    Procedure: Bilateral L3-L5 RADIOFREQUENCY ABLATION LUMBAR;  Surgeon: Nathaniel Hurst MD;  [place] [facility];  Service: Pain Management;  Laterality: Bilateral;    RADIOFREQUENCY ABLATION Bilateral 06/16/2022    Procedure: Bilateral L3-L5 RADIOFREQUENCY ABLATION LUMBAR;  Surgeon: Nathaniel Hurst MD;  [place] [facility];  Service: Pain Management;  Laterality: Bilateral;    RADIOFREQUENCY ABLATION Bilateral 02/01/2023    Procedure: Bilateral L3-L5 RADIOFREQUENCY ABLATION LUMBAR;  Surgeon: Nathaniel Hurst MD;  [place] [facility] [place];  Service: Pain  Management;  Laterality: Bilateral;    REFRACTIVE SURGERY Bilateral 2018    LASIK    SHOULDER ACROMIOPLASTY Right 2015    WITH DEBRIDEMENT OF LABRUM, DR. PANDA ENRIQUEZ    SHOULDER ARTHROSCOPY W/ LABRAL REPAIR Left 2005    DR. PANDA ENRIQUEZ    SHOULDER SURGERY  2015    STEROID INJECTION KNEE Left 02/10/2021    DR. VEDA LUIS    THROMBECTOMY N/A 2017    Procedure; External hemorrhoid thrombectomy, DR. BAMBI DONALD        Social History     Occupational History    Occupation: Software consulting   Tobacco Use    Smoking status: Former     Packs/day: 0.75     Years: 15.00     Pack years: 11.25     Types: Cigarettes     Start date: 3/8/2019     Quit date: 2018     Years since quittin.1    Smokeless tobacco: Former     Types: Chew     Quit date: 3/8/2019    Tobacco comments:     smokes about 1 pack / week   Vaping Use    Vaping Use: Never used   Substance and Sexual Activity    Alcohol use: Yes     Alcohol/week: 3.0 standard drinks     Types: 2 Cans of beer, 1 Drinks containing 0.5 oz of alcohol per week     Comment: 4 days (1-2 weeks)    Drug use: Not Currently     Comment: used marijuana in the past in college    Sexual activity: Yes     Partners: Female     Birth control/protection: Surgical      Social History     Social History Narrative    Not on file        Family History   Problem Relation Age of Onset    No Known Problems Mother     Melanoma Father     Depression Father     No Known Problems Sister     Parkinsonism Maternal Grandmother     Arthritis Maternal Grandmother     Migraine headaches Maternal Grandmother     Lung cancer Maternal Grandfather         smoker    Saguache's disease Maternal Grandfather     Cancer Maternal Grandfather     Migraine headaches Maternal Grandfather     Lung cancer Paternal Grandmother         smoker    Cancer Paternal Grandmother     Migraine headaches Paternal Grandmother     Cancer Maternal Aunt         spine    Lung cancer Paternal Aunt          "smoker    Cancer Paternal Aunt     Heart attack Paternal Uncle 55    Colon cancer Neg Hx     Prostate cancer Neg Hx        ROS: 14 point review of systems was performed and all other systems were reviewed and are negative except for documented findings in HPI and today's encounter.     Allergies: No Known Allergies  Constitutional:  Denies fever, shaking or chills   Eyes:  Denies change in visual acuity   HENT:  Denies nasal congestion or sore throat   Respiratory:  Denies cough or shortness of breath   Cardiovascular:  Denies chest pain or severe LE edema   GI:  Denies abdominal pain, nausea, vomiting, bloody stools or diarrhea   Musculoskeletal:  Numbness, tingling, pain, or loss of motor function only as noted above in history of present illness.  : Denies painful urination or hematuria  Integument:  Denies rash, lesion or ulceration   Neurologic:  Denies headache or focal weakness  Endocrine:  Denies lymphadenopathy  Psych:  Denies confusion or change in mental status   Hem:  Denies active bleeding    OBJECTIVE:  Physical Exam: 47 y.o. male  Wt Readings from Last 3 Encounters:   06/05/23 92.7 kg (204 lb 6.4 oz)   05/31/23 91.6 kg (202 lb)   03/15/23 89.4 kg (197 lb 3.2 oz)     Ht Readings from Last 1 Encounters:   06/05/23 175.3 cm (69\")     Body mass index is 30.18 kg/m².  Vitals:    06/05/23 0908   Temp: 97.5 °F (36.4 °C)     Vital signs reviewed.     General Appearance:    Alert, cooperative, in no acute distress                  Eyes: conjunctiva clear  ENT: external ears and nose atraumatic  CV: no peripheral edema  Resp: normal respiratory effort  Skin: no rashes or wounds; normal turgor  Psych: mood and affect appropriate  Lymph: no nodes appreciated  Neuro: gross sensation intact  Vascular:  Palpable peripheral pulse in noted extremity  Musculoskeletal Extremities: Exam today shows bilateral knees with some crepitation synovitis swelling joint line tenderness generally have a lot of pseudolaxity hips " are noncontributory    Radiology:   AP lateral 40 degree PA x-ray bilateral knees taken the office today for complaints of pain with comparison views show narrowing medially with subchondral sclerosis with arthritis seen more in the lateral and patellofemoral change little worse on the right than the left        Assessment:     ICD-10-CM ICD-9-CM   1. Pain in both knees, unspecified chronicity  M25.561 719.46    M25.562    2. Primary osteoarthritis of both knees  M17.0 715.16        MDM/Plan:   The diagnosis(es), natural history, pathophysiology and treatment for diagnosis(es) were discussed. Opportunity given and questions answered.  Biomechanics of pertinent body areas discussed.  When appropriate, the use of ambulatory aids discussed.    Biomechanics of pertinent body areas discussed.  When appropriate, the use of ambulatory aids discussed.  BMI:  The concept of BMI body mass index and its importance and implications discussed.    EXERCISES:  Advice on benefits of, and types of regular/moderate exercise pertaining to orthopedic diagnosis(es).  MEDICATIONS:  The risks, benefits, warnings,side effects and alternatives of medications discussed.  Cortisone Injection. See procedure note.  MEDICAL RECORDS reviewed from other provider(s) for past and current medical history pertinent to this complaint.      6/5/2023    Dictated utilizing Dragon dictation        Large Joint Arthrocentesis: R knee  Date/Time: 6/5/2023 9:30 AM  Consent given by: patient  Site marked: site marked  Timeout: Immediately prior to procedure a time out was called to verify the correct patient, procedure, equipment, support staff and site/side marked as required   Supporting Documentation  Indications: pain and joint swelling   Procedure Details  Location: knee - R knee  Preparation: Patient was prepped and draped in the usual sterile fashion  Needle size: 22 G  Approach: anterolateral  Medications administered: 80 mg methylPREDNISolone acetate  80 MG/ML; 4 mL lidocaine (cardiac)  Patient tolerance: patient tolerated the procedure well with no immediate complications      Large Joint Arthrocentesis: L knee  Date/Time: 6/5/2023 9:30 AM  Consent given by: patient  Site marked: site marked  Timeout: Immediately prior to procedure a time out was called to verify the correct patient, procedure, equipment, support staff and site/side marked as required   Supporting Documentation  Indications: pain and joint swelling   Procedure Details  Location: knee - L knee  Preparation: Patient was prepped and draped in the usual sterile fashion  Needle size: 22 G  Approach: anterolateral  Medications administered: 80 mg methylPREDNISolone acetate 80 MG/ML; 4 mL lidocaine (cardiac)  Patient tolerance: patient tolerated the procedure well with no immediate complications

## 2023-07-24 ENCOUNTER — TRANSCRIBE ORDERS (OUTPATIENT)
Dept: SURGERY | Facility: SURGERY CENTER | Age: 48
End: 2023-07-24
Payer: COMMERCIAL

## 2023-07-24 DIAGNOSIS — Z41.9 SURGERY, ELECTIVE: Primary | ICD-10-CM

## 2023-08-30 RX ORDER — FENTANYL CITRATE 50 UG/ML
100 INJECTION, SOLUTION INTRAMUSCULAR; INTRAVENOUS ONCE
Status: COMPLETED | OUTPATIENT
Start: 2023-08-31 | End: 2023-08-31

## 2023-08-30 RX ORDER — MIDAZOLAM HYDROCHLORIDE 2 MG/2ML
2 INJECTION, SOLUTION INTRAMUSCULAR; INTRAVENOUS ONCE
Status: COMPLETED | OUTPATIENT
Start: 2023-08-31 | End: 2023-08-31

## 2023-08-31 ENCOUNTER — HOSPITAL ENCOUNTER (OUTPATIENT)
Facility: SURGERY CENTER | Age: 48
Setting detail: HOSPITAL OUTPATIENT SURGERY
Discharge: HOME | End: 2023-08-31
Attending: ANESTHESIOLOGY | Admitting: ANESTHESIOLOGY
Payer: COMMERCIAL

## 2023-08-31 ENCOUNTER — HOSPITAL ENCOUNTER (OUTPATIENT)
Dept: GENERAL RADIOLOGY | Facility: SURGERY CENTER | Age: 48
Setting detail: HOSPITAL OUTPATIENT SURGERY
End: 2023-08-31
Payer: COMMERCIAL

## 2023-08-31 VITALS
TEMPERATURE: 97.8 F | HEIGHT: 69 IN | BODY MASS INDEX: 29.92 KG/M2 | HEART RATE: 75 BPM | OXYGEN SATURATION: 94 % | WEIGHT: 202 LBS | SYSTOLIC BLOOD PRESSURE: 116 MMHG | DIASTOLIC BLOOD PRESSURE: 83 MMHG | RESPIRATION RATE: 16 BRPM

## 2023-08-31 DIAGNOSIS — Z41.9 SURGERY, ELECTIVE: ICD-10-CM

## 2023-08-31 DIAGNOSIS — M47.816 LUMBAR FACET ARTHROPATHY: ICD-10-CM

## 2023-08-31 PROCEDURE — S0260 H&P FOR SURGERY: HCPCS | Performed by: ANESTHESIOLOGY

## 2023-08-31 PROCEDURE — 99152 MOD SED SAME PHYS/QHP 5/>YRS: CPT | Performed by: ANESTHESIOLOGY

## 2023-08-31 PROCEDURE — 25010000002 MIDAZOLAM PER 1MG: Performed by: ANESTHESIOLOGY

## 2023-08-31 PROCEDURE — 25010000002 BUPIVACAINE (PF) 0.5 % SOLUTION 10 ML VIAL: Performed by: ANESTHESIOLOGY

## 2023-08-31 PROCEDURE — 64635 DESTROY LUMB/SAC FACET JNT: CPT | Performed by: ANESTHESIOLOGY

## 2023-08-31 PROCEDURE — 64636 DESTROY L/S FACET JNT ADDL: CPT | Performed by: ANESTHESIOLOGY

## 2023-08-31 PROCEDURE — 76000 FLUOROSCOPY <1 HR PHYS/QHP: CPT

## 2023-08-31 PROCEDURE — 25010000002 FENTANYL CITRATE (PF) 50 MCG/ML SOLUTION: Performed by: ANESTHESIOLOGY

## 2023-08-31 RX ORDER — CETIRIZINE HYDROCHLORIDE 10 MG/1
10 TABLET ORAL DAILY
COMMUNITY

## 2023-08-31 RX ORDER — SODIUM CHLORIDE 0.9 % (FLUSH) 0.9 %
10 SYRINGE (ML) INJECTION AS NEEDED
Status: DISCONTINUED | OUTPATIENT
Start: 2023-08-31 | End: 2023-08-31 | Stop reason: HOSPADM

## 2023-08-31 RX ORDER — SODIUM CHLORIDE 0.9 % (FLUSH) 0.9 %
10 SYRINGE (ML) INJECTION EVERY 12 HOURS SCHEDULED
Status: DISCONTINUED | OUTPATIENT
Start: 2023-08-31 | End: 2023-08-31 | Stop reason: HOSPADM

## 2023-08-31 RX ADMIN — FENTANYL CITRATE 50 MCG: 0.05 INJECTION, SOLUTION INTRAMUSCULAR; INTRAVENOUS at 09:15

## 2023-08-31 RX ADMIN — MIDAZOLAM HYDROCHLORIDE 2 MG: 2 INJECTION, SOLUTION INTRAMUSCULAR; INTRAVENOUS at 09:14

## 2023-08-31 NOTE — OP NOTE
Bilateral L3-5 Lumbar Medial Branch RADIOFREQUENCY  [facility]      PREOPERATIVE DIAGNOSIS:  Lumbar spondylosis without myelopathy    POSTOPERATIVE DIAGNOSIS:  Lumbar spondylosis without myelopathy    PROCEDURE:   Diagnostic Bilateral Lumbar Medial Branch Nerve thermal radiofrequency lesioning, with fluoroscopy:  L3, L4, and L5 nerves (at the L4 and L5 transverse processes and the sacral alar groove) to thermally treat the innervation to facet joints L4-5 and L5-S1  76640-69 -- Bilateral L/S facet neuro destr., 1st Level  00430-53 -- Bilateral L/S facet neuro destr., 2nd  Level    PRE-PROCEDURE DISCUSSION WITH PATIENT:    Risks and complications were discussed with the patient prior to starting the procedure and informed consent was obtained.      SURGEON:  Nathaniel Hurst MD    REASON FOR PROCEDURE:    The patient complains of pain that seems to have a significant axial component and Previous clinically significant therapeutic effect after prior Radiofrequency procedure    SEDATION:  Versed 2mg & Fentanyl 50 mcg IV  TIME OF PROCEDURE:   The intraoperative procedure time after administration of the sedative was (09:24-09:39) 15 minutes.     ANESTHETIC:  Lidocaine 2%  STEROID:  NONE      DESCRIPTON OF PROCEDURE:  After obtaining informed consent, IV access  was obtained in the preoperative area.   The patient was taken to the operating room.  The patient was placed in the prone position with a pillow under the abdomen. All pressure points were well padded.  EKG, blood pressure, and pulse oximeter were monitored.  The patient was monitored and sedated by the RN under my direction. The lumbosacral area was prepped with Chloraprep and draped in a sterile fashion.     Under fluoroscopic guidance the transverse processes of the L4 and L5 vertebrae at the junctions of the superior articular processes were identified on the right.  Also identified was the groove between the ala and the superior  articular process of the sacrum on the ipsilateral side.  Skin and subcutaneous tissue were anesthetized with 1ml of 1% lidocaine above each of these points. Then, radiofrequency probe needles were advanced in this fluoro view to the above junctions.  Aspiration was negative for blood and CSF.  After confirming the position of the needle with fluoroscope in all views, testing was initiated.  First, sensory testing was started on each needle a 1V and 50Hz and slowly decreased until painful pressure stimulation diminished at 0.5V.  Next, motor testing was confirmed to be negative at 3V and 2Hz for any radicular stimulation.  Then 1mL of the local anesthetic was instilled in each needle.  Two minutes elapsed, and during this time a lateral fluoroscopic view was confirmed again to ensure the needles had not advanced nor retracted.  Then, Radiofrequency Lesioning was initiated for 2.5 minutes at 82 degrees Celsius.  Needles were removed intact from each of the areas.     A similar procedure was repeated to address the L3, L4, and L5 nerves on the contralateral side.   Onset of analgesia was noted.  Vital signs remained stable throughout.      ESTIMATED BLOOD LOSS:  <5 mL  SPECIMENS:  none    COMPLICATIONS:   No complications were noted., There was not any evidence of dural puncture.  , and The patient did not have any signs of postprocedure numbness nor weakness.    TOLERANCE & DISCHARGE CONDITION:    The patient tolerated the procedure well.  The patient was transported to the recovery area without difficulties.  The patient was discharged to home under the care of family in stable and satisfactory condition.    PLAN OF CARE:  The patient was given our standard instruction sheet.  The patient will  Return to clinic 6 wks.  The patient will resume all medications as per the medication reconciliation sheet.

## 2023-08-31 NOTE — H&P
Brief Pre-procedural / Pre-operative H&P        -----    Pertinent Diagnosis:   Lumbar spondylosis.  Lumbar facet arthropathy    Proposed Procedure: Lumbar medial branch radiofrequency ablation      Subjective   Sadiq Alexander is a 48 y.o. male  who presents for intervention.  He has a history of back pain.      History of Present Illness     He had significant therapeutic relief from radiofrequency ablation about 9 months ago.  He has had many radiofrequency ablations with to 90% relief for up to 6 months.  He has responded well to the previous 5 radiofrequency ablation and with recurrent pain in the same distribution and is very reasonable to repeat such procedure.  No radicular component.  Axial elements.  He does have notable decreased range of motion and positive facet loading on exam.  -------    The following portions of the patient's history were reviewed and updated as appropriate: allergies, current medications, past family history, past medical history, past social history, past surgical history and problem list.    No Known Allergies      Current Facility-Administered Medications:     sodium chloride 0.9 % flush 10 mL, 10 mL, Intravenous, Q12H, Nathaniel Hurst MD    sodium chloride 0.9 % flush 10 mL, 10 mL, Intravenous, PRN, Nathaniel Hurst MD    No current facility-administered medications on file prior to encounter.     Current Outpatient Medications on File Prior to Encounter   Medication Sig Dispense Refill    cetirizine (zyrTEC) 10 MG tablet Take 1 tablet by mouth Daily.      cyclobenzaprine (FLEXERIL) 10 MG tablet TAKE 1 TABLET BY MOUTH THREE TIMES A DAY (Patient taking differently: Take 1 tablet by mouth 3 (Three) Times a Day As Needed for Muscle Spasms.) 90 tablet 3    diclofenac (VOLTAREN) 50 MG EC tablet TAKE 1 TABLET BY MOUTH 2 TIMES A DAY AS NEEDED FOR PAIN 180 tablet 0    pantoprazole (PROTONIX) 40 MG EC tablet Take 1 tablet by mouth Daily. 90 tablet 3    rosuvastatin (CRESTOR) 10 MG  tablet Take 1 tablet by mouth Daily. 90 tablet 3    Fremanezumab-vfrm (Ajovy) 225 MG/1.5ML solution prefilled syringe Inject 1.5 mL under the skin into the appropriate area as directed Every 30 (Thirty) Days. 4.5 mL 3       Patient Active Problem List   Diagnosis    Colon polyp    Internal hemorrhoids    Migraines    Nicotine dependence    Anxiety disorder    Hyperlipidemia    Irritable bowel syndrome with both constipation and diarrhea    GERD with esophagitis    Restless legs syndrome (RLS)    Degenerative lumbar disc    Synovial cyst of lumbar facet joint    Chronic back pain    Lumbar facet arthropathy    Muscle spasm    Chronic midline back pain    Prediabetes       Past Medical History:   Diagnosis Date    Abnormal MRI, knee 02/04/2021    LRFT KNEE, MRI DONE AT [facility]    ADHD     Anxiety     Arthritis     Arthritis of back     Blood in stool     Bowel incontinence     Chronic midline low back pain without sciatica     Colon polyps     FOLLOWED BY DR. DOMINIQUE ALLEN    Constipation     DDD (degenerative disc disease), lumbar     Dislocation, shoulder     Environmental allergies     Fatigue     Frozen shoulder     GERD (gastroesophageal reflux disease)     Hemorrhoids 05/24/2017    Hiatal hernia 11/26/2018    Dr. Dominique Allen.    History of traumatic head injury     Hyperlipidemia     IBS (irritable bowel syndrome)     IBS WITH BOTH CONSTIPATION & DIARRHEA.    Influenza 01/07/2014    Knee swelling     Ligamentous laxity of left knee 01/2021    Low back pain 10/10/2019    [place] [facility], Right sided low back pain without sciatica.    Lumbar facet arthropathy     Lumbar paraspinal muscle spasm     Lumbar spondylosis     Lumbar strain 09/2019    Lumbar strain 08/04/2018    SEEN AT [facility]    Lumbosacral disc disease     Migraines     Multiple atypical skin moles     See's derm and has family history of melanoma.    NSAID long-term use     Osteoarthritis of lumbar spine     Perianal venous thrombosis 05/2017     Periarthritis of shoulder     Right ankle sprain 12/22/2013    RLS (restless legs syndrome)     Rotator cuff syndrome     Shoulder impingement, right 01/26/2018    FOLLOWED BY DR. PANDA ENRIQUEZ    Sprain and strain of left ankle 12/22/2013    [facility], ankle injury.    Strain of right knee 08/08/2016    FOLLOWED BY DR. PANDA ENRIQUEZ    Synovial cyst of lumbar facet joint     Tear of meniscus of knee     Thrombosed hemorrhoids 03/2022    Thumb laceration, left, initial encounter 01/08/2017    [facility] [facility], cut it while cooking.       Past Surgical History:   Procedure Laterality Date    CHOLECYSTECTOMY WITH INTRAOPERATIVE CHOLANGIOGRAM N/A 02/05/2019    Procedure: CHOLECYSTECTOMY LAPAROSCOPIC INTRAOPERATIVE CHOLANGIOGRAM;  Surgeon: Reji Hoffman MD;  [facility] [facility];  Service: General    COLONOSCOPY N/A 03/11/2015    Two 4-6 mm polyps at the recto-sigmoid and in the mid ascending colon (PATH: tubulovillous adenoma w/ low-grade dysplasia & hyperplastic colon polyp at 20 cm), internal hemorrhoids; Dr. Chato Allen    COLONOSCOPY N/A 09/13/2018    Tortuous colon, internal hemorrhoids, Dr. Chato Allen.    ENDOSCOPY N/A 01/07/2019    IRREGULAR Z LINE AT 41CM, ACTIVE DUODENITIS, MILD ACTIVE ESOPHAGITIS, REACTIVE GASTROPATHY, DR. CHATO ALLEN AT [facility]    HEMORRHOID BANDING N/A     MEDIAL BRANCH BLOCK Bilateral 01/15/2020    medial branch block (Bilateral lumbar 3 - Lumbar 5 synovial cyst), Dr. Nathaniel Hurst.    NERVE SURGERY Bilateral 03/16/2021    Procedure: Bilateral L3-L5 RADIOFREQUENCY ABLATION LUMBAR;  Surgeon: Nathaniel Hurst MD;  Location: [facility] MAIN OR;  Service: Pain Management;  Laterality: Bilateral;    RADIOFREQUENCY ABLATION Bilateral 05/27/2020    L3-L5 Radiofrequency ablation lumbar, Surgeon Nathaniel Hurst.    RADIOFREQUENCY ABLATION Bilateral 11/18/2021    Procedure: Bilateral L3-L5 RADIOFREQUENCY ABLATION LUMBAR;  Surgeon: Nathaniel Hurst MD;   Location: [facility];  Service: Pain Management;  Laterality: Bilateral;    RADIOFREQUENCY ABLATION Bilateral 06/16/2022    Procedure: Bilateral L3-L5 RADIOFREQUENCY ABLATION LUMBAR;  Surgeon: Nathaniel Hurst MD;  Location: [facility];  Service: Pain Management;  Laterality: Bilateral;    RADIOFREQUENCY ABLATION Bilateral 02/01/2023    Procedure: Bilateral L3-L5 RADIOFREQUENCY ABLATION LUMBAR;  Surgeon: Nathaniel Hurst MD;  Location: [facility];  Service: Pain Management;  Laterality: Bilateral;    REFRACTIVE SURGERY Bilateral 2018    LASIK    SHOULDER ACROMIOPLASTY Right 02/08/2015    WITH DEBRIDEMENT OF LABRUM, DR. PANDA ENRIQUEZ    SHOULDER ARTHROSCOPY W/ LABRAL REPAIR Left 06/2005    DR. PANDA ENRIQUEZ    SHOULDER SURGERY  2/2015    STEROID INJECTION KNEE Left 02/10/2021    DR. VEDA LUIS    THROMBECTOMY N/A 05/24/2017    Procedure; External hemorrhoid thrombectomy, DR. BAMBI DONALD       Family History   Problem Relation Age of Onset    No Known Problems Mother     Melanoma Father     Depression Father     No Known Problems Sister     Parkinsonism Maternal Grandmother     Arthritis Maternal Grandmother     Migraine headaches Maternal Grandmother     Lung cancer Maternal Grandfather         smoker    Andrew's disease Maternal Grandfather     Cancer Maternal Grandfather     Migraine headaches Maternal Grandfather     Lung cancer Paternal Grandmother         smoker    Cancer Paternal Grandmother     Migraine headaches Paternal Grandmother     Cancer Maternal Aunt         spine    Lung cancer Paternal Aunt         smoker    Cancer Paternal Aunt     Heart attack Paternal Uncle 55    Colon cancer Neg Hx     Prostate cancer Neg Hx        Social History     Socioeconomic History    Marital status:      Spouse name: Doris    Number of children: 3   Tobacco Use    Smoking status: Former     Packs/day: 0.75     Years: 15.00     Pack years: 11.25     Types: Cigarettes     Start date: 3/8/2019     Quit  "date: 2018     Years since quittin.3    Smokeless tobacco: Former     Types: Chew     Quit date: 3/8/2019    Tobacco comments:     smokes about 1 pack / week   Vaping Use    Vaping Use: Never used   Substance and Sexual Activity    Alcohol use: Yes     Alcohol/week: 3.0 standard drinks     Types: 2 Cans of beer, 1 Drinks containing 0.5 oz of alcohol per week     Comment: 4 days (1-2 weeks)    Drug use: Not Currently     Comment: used marijuana in the past in college    Sexual activity: Yes     Partners: Female     Birth control/protection: Surgical       -------       Review of Systems  No Fever, No Chills, No ear pain, No sinus pressure or drainage, No eye pain or drainage, No cough, No SOB, No chest tightness nor chest pain, no palpitations.          Vitals:    23 1545 23 0833 23 0915   BP:  121/83 127/92   BP Location:  Left arm    Patient Position:  Lying    Pulse:  72 66   Resp:  16 16   Temp:  97 °F (36.1 °C)    TempSrc:  Temporal    SpO2:  95% 96%   Weight: 91.6 kg (202 lb) 91.6 kg (202 lb)    Height: 175.3 cm (69\") 175.3 cm (69\")          Objective   Physical Exam  VSS, NNR, NCAT, NMNA, NRD, AAOx3.  No lesions low back.  Facet tender pos bilat    -------    Assessment & Plan:  - as noted above, the stated intervention is indicated  - Follow-up plan will be noted in the operative report        Ov 6 wks      EMR Dragon/Transcription disclaimer:   Typed items in this encounter note may have been created by electronic transcription/translation software which converts spoken language to printed text. The electronic translation of spoken language may permit erroneous, or at times, nonsensical words or phrases to be inadvertently transcribed; Although I have reviewed the note for such errors, some may still exist.      "

## 2023-08-31 NOTE — DISCHARGE INSTRUCTIONS
[facility] - Post-procedure Instructions          --  While there are no absolute restrictions, it is recommended that you do not perform strenuous activity today. In the morning, you may resume your level of activity as before your block.    --  If you have a band-aid at your injection site, please remove it later today. Observe the area for any redness, swelling, pus-like drainage, or a temperature over 101°. If any of these symptoms occur, please call your doctor at [phone]. If after office hours, leave a message and the on-call provider will return your call.    --  Ice may be applied to your injection site. It is recommended you avoid direct heat (heating pad; hot tub) for 1-2 days.    --  Call [facility] at [phone] if you experience persistent headache, persistent bleeding from the injection site, or severe pain not relieved by heat or oral medication.    --  Do not make important decisions today.    --  Due to the effects of the block and/or the I.V. Sedation, DO NOT drive or operate hazardous machinery for 12 hours.  Local anesthetics may cause numbness after procedure and precautions must be taken with regards to operating equipment as well as with walking, even if ambulating with assistance of another person or with an assistive device.    --  Do not drink alcohol for 12 hours.    -- You may return to work tomorrow, or as directed by your referring doctor.    --  Occasionally you may notice a slight increase in your pain after the procedure. This should start to improve within the next 24-48 hours. Radiofrequency ablation procedure pain may last 3-4 weeks.        -- You may continue to take your prescribed pain medication as needed.      ---  Must stay in PACU for 20 min upon arrival and prove no leg weakness before being discharged.    --  IN THE EVENT OF A LIFE THREATENING EMERGENCY, (CHEST PAIN, BREATHING DIFFICULTIES, PARALYSIS…) YOU SHOULD GO TO YOUR NEAREST  EMERGENCY ROOM.    --  You should be contacted by our office within 2-3 days to schedule follow up or next appointment date.  If not contacted within 7 days, please call the office at [phone]

## 2023-10-25 ENCOUNTER — SPECIALTY PHARMACY (OUTPATIENT)
Dept: NEUROLOGY | Facility: CLINIC | Age: 48
End: 2023-10-25
Payer: COMMERCIAL

## 2023-10-25 RX ORDER — FREMANEZUMAB-VFRM 225 MG/1.5ML
1.5 INJECTION SUBCUTANEOUS
Qty: 4.5 ML | Refills: 1 | Status: SHIPPED | OUTPATIENT
Start: 2023-10-25

## 2023-11-07 ENCOUNTER — SPECIALTY PHARMACY (OUTPATIENT)
Dept: NEUROLOGY | Facility: CLINIC | Age: 48
End: 2023-11-07
Payer: COMMERCIAL

## 2023-11-07 RX ORDER — ZOLMITRIPTAN 5 MG/1
5 TABLET, ORALLY DISINTEGRATING ORAL ONCE AS NEEDED
Qty: 9 TABLET | Refills: 3 | Status: SHIPPED | OUTPATIENT
Start: 2023-11-07

## 2023-11-30 ENCOUNTER — OFFICE VISIT (OUTPATIENT)
Dept: INTERNAL MEDICINE | Age: 48
End: 2023-11-30
Payer: COMMERCIAL

## 2023-11-30 VITALS
WEIGHT: 203 LBS | TEMPERATURE: 97.3 F | HEART RATE: 72 BPM | HEIGHT: 69 IN | DIASTOLIC BLOOD PRESSURE: 88 MMHG | OXYGEN SATURATION: 98 % | SYSTOLIC BLOOD PRESSURE: 128 MMHG | BODY MASS INDEX: 30.07 KG/M2

## 2023-11-30 DIAGNOSIS — E78.5 HYPERLIPIDEMIA, UNSPECIFIED HYPERLIPIDEMIA TYPE: Primary | Chronic | ICD-10-CM

## 2023-11-30 DIAGNOSIS — K21.00 GASTROESOPHAGEAL REFLUX DISEASE WITH ESOPHAGITIS WITHOUT HEMORRHAGE: Chronic | ICD-10-CM

## 2023-11-30 DIAGNOSIS — M54.9 CHRONIC MIDLINE BACK PAIN, UNSPECIFIED BACK LOCATION: Chronic | ICD-10-CM

## 2023-11-30 DIAGNOSIS — Z51.81 THERAPEUTIC DRUG MONITORING: ICD-10-CM

## 2023-11-30 DIAGNOSIS — F17.218 CIGARETTE NICOTINE DEPENDENCE WITH OTHER NICOTINE-INDUCED DISORDER: Chronic | ICD-10-CM

## 2023-11-30 DIAGNOSIS — K59.00 CONSTIPATION, UNSPECIFIED CONSTIPATION TYPE: ICD-10-CM

## 2023-11-30 DIAGNOSIS — R73.03 PREDIABETES: Chronic | ICD-10-CM

## 2023-11-30 DIAGNOSIS — G89.29 CHRONIC MIDLINE BACK PAIN, UNSPECIFIED BACK LOCATION: Chronic | ICD-10-CM

## 2023-11-30 LAB
ALBUMIN SERPL-MCNC: 5 G/DL (ref 3.5–5.2)
ALBUMIN/GLOB SERPL: 2.6 G/DL
ALP SERPL-CCNC: 97 U/L (ref 39–117)
ALT SERPL-CCNC: 31 U/L (ref 1–41)
AST SERPL-CCNC: 20 U/L (ref 1–40)
BASOPHILS # BLD AUTO: 0.04 10*3/MM3 (ref 0–0.2)
BASOPHILS NFR BLD AUTO: 0.3 % (ref 0–1.5)
BILIRUB SERPL-MCNC: 0.2 MG/DL (ref 0–1.2)
BUN SERPL-MCNC: 14 MG/DL (ref 6–20)
BUN/CREAT SERPL: 11.8 (ref 7–25)
CALCIUM SERPL-MCNC: 10.3 MG/DL (ref 8.6–10.5)
CHLORIDE SERPL-SCNC: 106 MMOL/L (ref 98–107)
CHOLEST SERPL-MCNC: 211 MG/DL (ref 0–200)
CHOLEST/HDLC SERPL: 5.41 {RATIO}
CO2 SERPL-SCNC: 26.5 MMOL/L (ref 22–29)
CREAT SERPL-MCNC: 1.19 MG/DL (ref 0.76–1.27)
EGFRCR SERPLBLD CKD-EPI 2021: 75.8 ML/MIN/1.73
EOSINOPHIL # BLD AUTO: 0.14 10*3/MM3 (ref 0–0.4)
EOSINOPHIL NFR BLD AUTO: 1.1 % (ref 0.3–6.2)
ERYTHROCYTE [DISTWIDTH] IN BLOOD BY AUTOMATED COUNT: 13.2 % (ref 12.3–15.4)
GLOBULIN SER CALC-MCNC: 1.9 GM/DL
GLUCOSE SERPL-MCNC: 99 MG/DL (ref 65–99)
HBA1C MFR BLD: 5.8 % (ref 4.8–5.6)
HCT VFR BLD AUTO: 44.9 % (ref 37.5–51)
HDLC SERPL-MCNC: 39 MG/DL (ref 40–60)
HGB BLD-MCNC: 15.4 G/DL (ref 13–17.7)
IMM GRANULOCYTES # BLD AUTO: 0.05 10*3/MM3 (ref 0–0.05)
IMM GRANULOCYTES NFR BLD AUTO: 0.4 % (ref 0–0.5)
LDLC SERPL CALC-MCNC: 130 MG/DL (ref 0–100)
LYMPHOCYTES # BLD AUTO: 2.98 10*3/MM3 (ref 0.7–3.1)
LYMPHOCYTES NFR BLD AUTO: 22.6 % (ref 19.6–45.3)
MCH RBC QN AUTO: 31.2 PG (ref 26.6–33)
MCHC RBC AUTO-ENTMCNC: 34.3 G/DL (ref 31.5–35.7)
MCV RBC AUTO: 91.1 FL (ref 79–97)
MONOCYTES # BLD AUTO: 0.85 10*3/MM3 (ref 0.1–0.9)
MONOCYTES NFR BLD AUTO: 6.5 % (ref 5–12)
NEUTROPHILS # BLD AUTO: 9.11 10*3/MM3 (ref 1.7–7)
NEUTROPHILS NFR BLD AUTO: 69.1 % (ref 42.7–76)
NRBC BLD AUTO-RTO: 0 /100 WBC (ref 0–0.2)
PLATELET # BLD AUTO: 291 10*3/MM3 (ref 140–450)
POTASSIUM SERPL-SCNC: 4.3 MMOL/L (ref 3.5–5.2)
PROT SERPL-MCNC: 6.9 G/DL (ref 6–8.5)
RBC # BLD AUTO: 4.93 10*6/MM3 (ref 4.14–5.8)
SODIUM SERPL-SCNC: 144 MMOL/L (ref 136–145)
TRIGL SERPL-MCNC: 235 MG/DL (ref 0–150)
VLDLC SERPL CALC-MCNC: 42 MG/DL (ref 5–40)
WBC # BLD AUTO: 13.17 10*3/MM3 (ref 3.4–10.8)

## 2023-11-30 NOTE — ASSESSMENT & PLAN NOTE
Lab Results   Component Value Date    GLUCOSE 92 05/31/2023    GLUCOSE 94 08/17/2022    GLUCOSE 99 02/16/2022     Lab Results   Component Value Date    HGBA1C 5.70 (H) 05/31/2023    HGBA1C 6.0 (H) 08/17/2022    HGBA1C 5.51 06/20/2018      Check lab today.   Maintain a low sugar/starch/carbohydrate diet.   Watch for weight gain with smoking cessation efforts.

## 2023-11-30 NOTE — ASSESSMENT & PLAN NOTE
Taking diclofenac 50 mg BID.   Advised to have labs monitored every 6 months.   Watch for GI issues.   Check lab today.

## 2023-11-30 NOTE — ASSESSMENT & PLAN NOTE
Time spent counseling: 10 minutes  Currently smokes 1/2 PPD  Duration of smoking:  Many years   Prior cessation efforts: bupropion   Counseled on smoking cessation for 5 minutes: advised patient to quit, handout given on 5 steps to prepare for smoking cessation, discussed nicotine replacement therapy, start NRT (patch), and discussed watching for significant mood changes, watching for suicidial ideations/depression/anxiety/agitiation  Follow-up in 6 months

## 2023-11-30 NOTE — PATIENT INSTRUCTIONS
** IMPORTANT MESSAGE FROM DR. THACKER **    In our office, your satisfaction is VERY important to us.     You may receive a survey from [facility] by mail or E-mail for you to provide feedback about your visit. This information is invaluable for me to know what we can do to improve our services.     I ask that you please take a few minutes to complete the survey and let us know how we are doing in serving your needs. (You may receive the survey more than once for multiple visits)    Thank You !    Dr. Thacker    _________________________________________________________________________________________________________________________      ** ADDITIONAL INSTRUCTION / REMINDERS FROM DR. THACKER **

## 2023-11-30 NOTE — LETTER
[facility]  Vaccine Consent Form    Patient Name:  Sadiq Alexander  Patient :  1975     Vaccine(s) Ordered    Fluzone (or Fluarix & Flulaval for VFC) >6 Mos (1020-2415)        Screening Checklist  The following questions should be completed prior to vaccination. If you answer “yes” to any question, it does not necessarily mean you should not be vaccinated. It just means we may need to clarify or ask more questions. If a question is unclear, please ask your healthcare provider to explain it.    Yes No   Any fever or moderate to severe illness today (mild illness and/or antibiotic treatment are not contraindications)?     Do you have a history of a serious reaction to any previous vaccinations, such as anaphylaxis, encephalopathy within 7 days, Guillain-Cresson syndrome within 6 weeks, seizure?     Have you received any live vaccine(s) (e.g MMR, MIRYAM) or any other vaccines in the last month (to ensure duplicate doses aren't given)?     Do you have an anaphylactic allergy to latex (DTaP, DTaP-IPV, Hep A, Hep B, MenB, RV, Td, Tdap), baker’s yeast (Hep B, HPV), polysorbates (RSV, nirsevimab, PCV 20, Rotavirrus, Tdap, Shingrix), or gelatin (MIRYAM, MMR)?     Do you have an anaphylactic allergy to neomycin (Rabies, MIRYAM, MMR, IPV, Hep A), polymyxin B (IPV), or streptomycin (IPV)?      Any cancer, leukemia, AIDS, or other immune system disorder? (MIRYAM, MMR, RV)     Do you have a parent, brother, or sister with an immune system problem (if immune competence of vaccine recipient clinically verified, can proceed)? (MMR, MIRYAM)     Any recent steroid treatments for >2 weeks, chemotherapy, or radiation treatment? (MIRYAM, MMR)     Have you received antibody-containing blood transfusions or IVIG in the past 11 months (recommended interval is dependent on product)? (MMR, MIRYAM)     Have you taken antiviral drugs (acyclovir, famciclovir, valacyclovir for MIRYAM) in the last 24 or 48 hours, respectively?      Are you pregnant or planning to  become pregnant within 1 month? (MIRYAM, MMR, HPV, IPV, MenB, Abrexvy; For Hep B- refer to Engerix-B; For RSV - Abrysvo is indicated for 32-36 weeks of pregnancy from September to January)     For infants, have you ever been told your child has had intussusception or a medical emergency involving obstruction of the intestine (Rotavirus)? If not for an infant, can skip this question.         *Ordering Physician/APC should be consulted if “yes” is checked by the patient or guardian above.      I have received, read, and understand the Vaccine Information Statement (VIS) for each vaccine ordered above.  I have considered my health status as well as the health status of my close contacts.  I have taken the opportunity to discuss my vaccine questions with my health care provider.   I have requested that the ordered vaccine(s) be given to me.  I understand the benefits and risks of the vaccines.  I understand that I should remain in the clinic for 15 minutes after receiving the vaccine(s).  _________________________________________________________  Signature of Patient or Parent/Legal Guardian ____________________  Date

## 2023-11-30 NOTE — PROGRESS NOTES
I N T E R N A L  M E D I C I N E    J U N O H  K I M,  M D      ENCOUNTER DATE:  11/30/2023    Sadiq Alexander / 48 y.o. / male    CHIEF COMPLAINT / REASON FOR OFFICE VISIT     Hyperlipidemia,  GERD with esophagitis , Prediabetes, and Nicotine Dependence      ASSESSMENT & PLAN     Problem List Items Addressed This Visit          Cardiac and Vasculature    Hyperlipidemia - Primary (Chronic)    Overview     Continue rosuvastatin 10 mg.          Relevant Medications    rosuvastatin (CRESTOR) 10 MG tablet    Other Relevant Orders    Comprehensive Metabolic Panel    Lipid Panel With / Chol / HDL Ratio       Endocrine and Metabolic    Prediabetes (Chronic)    Current Assessment & Plan     Lab Results   Component Value Date    GLUCOSE 92 05/31/2023    GLUCOSE 94 08/17/2022    GLUCOSE 99 02/16/2022     Lab Results   Component Value Date    HGBA1C 5.70 (H) 05/31/2023    HGBA1C 6.0 (H) 08/17/2022    HGBA1C 5.51 06/20/2018      Check lab today.   Maintain a low sugar/starch/carbohydrate diet.   Watch for weight gain with smoking cessation efforts.          Relevant Orders    Hemoglobin A1c       Gastrointestinal Abdominal     GERD with esophagitis (Chronic)    Overview     Continue pantoprazole 40 mg qd.          Relevant Medications    pantoprazole (PROTONIX) 40 MG EC tablet       Musculoskeletal and Injuries    Chronic back pain (Chronic)    Overview     *Sees pain mgmt     Lumbar herniated disc  Facet arthropathy    Taking diclofenac 50 mg BID (pain mgmt)         Current Assessment & Plan     Taking diclofenac 50 mg BID.   Advised to have labs monitored every 6 months.   Watch for GI issues.   Check lab today.          Relevant Orders    CBC & Differential       Tobacco    Nicotine dependence (Chronic)    Current Assessment & Plan     Time spent counseling: 10 minutes  Currently smokes 1/2 PPD  Duration of smoking:  Many years   Prior cessation efforts: bupropion   Counseled on smoking cessation for 5 minutes: advised  "patient to quit, handout given on 5 steps to prepare for smoking cessation, discussed nicotine replacement therapy, start NRT (patch), and discussed watching for significant mood changes, watching for suicidial ideations/depression/anxiety/agitiation  Follow-up in 6 months           Other Visit Diagnoses       Therapeutic drug monitoring        Relevant Orders    CBC & Differential    Constipation, unspecified constipation type              Orders Placed This Encounter   Procedures    Fluzone (or Fluarix & Flulaval for VFC) >6 Mos (9039-4445)    Comprehensive Metabolic Panel    Lipid Panel With / Chol / HDL Ratio    Hemoglobin A1c    CBC & Differential     No orders of the defined types were placed in this encounter.      SUMMARY/DISCUSSION        Next Appointment with me: Visit date not found    Return in about 6 months (around 5/30/2024) for Reassess chronic medical problems.      VITAL SIGNS     Vitals:    11/30/23 0840   BP: 128/88   Pulse: 72   Temp: 97.3 °F (36.3 °C)   SpO2: 98%   Weight: 92.1 kg (203 lb)   Height: 175.3 cm (69\")       BP Readings from Last 3 Encounters:   11/30/23 128/88   08/31/23 116/83   07/17/23 118/90     Wt Readings from Last 3 Encounters:   11/30/23 92.1 kg (203 lb)   08/31/23 91.6 kg (202 lb)   07/17/23 91.7 kg (202 lb 3.2 oz)     Body mass index is 29.98 kg/m².    Blood pressure readings recorded on patient flowsheet:       No data to display                  MEDICATIONS AT THE TIME OF OFFICE VISIT     Current Outpatient Medications on File Prior to Visit   Medication Sig    cetirizine (zyrTEC) 10 MG tablet Take 1 tablet by mouth Daily.    cyclobenzaprine (FLEXERIL) 10 MG tablet TAKE 1 TABLET BY MOUTH THREE TIMES A DAY (Patient taking differently: Take 1 tablet by mouth 3 (Three) Times a Day As Needed for Muscle Spasms.)    diclofenac (VOLTAREN) 50 MG EC tablet TAKE 1 TABLET BY MOUTH 2 TIMES A DAY AS NEEDED FOR PAIN    Fremanezumab-vfrm (Ajovy) 225 MG/1.5ML solution prefilled syringe " Inject 1.5 mL under the skin into the appropriate area as directed Every 30 (Thirty) Days.    pantoprazole (PROTONIX) 40 MG EC tablet Take 1 tablet by mouth Daily.    rosuvastatin (CRESTOR) 10 MG tablet Take 1 tablet by mouth Daily.    ZOLMitriptan (ZOMIG-ZMT) 5 MG disintegrating tablet Place 1 tablet on the tongue 1 (One) Time As Needed for Migraine. May repeat 1 tablet in 2 hours if needed. Max of 2 tablets in 24 hours.     No current facility-administered medications on file prior to visit.          HISTORY OF PRESENT ILLNESS     Continues pantoprazole 40 mg daily for management of GERD. On diclofenac 75 mg daily for chronic back pain. He is prescribed diclofenac 2 times daily; however, he only takes 1 tablet daily due to concerns of liver issues. Sees pain management for his back pain but has not had recent labs. Last labs were obtained in 05/2023. He continues to have intermittent constipation followed by episodes of diarrhea. Has tried various fiber supplements but was unable to tolerate them. He has not tried Benefiber.     Hyperlipidemia is managed on rosuvastatin 10 mg daily. He has been taking his medication regularly. Denies side effects.     Previous A1c was 5.7 percent improved from prior at 6 percent. Has been trying to exercise more. Will recheck today.     Currently smoke 8 to 10 cigarettes daily. Interested in quitting. He has quit in the past with nicotine supplements. Also tried Wellbutrin but did not tolerate this. He has used meditation apps in the past with some improvement.     His stress level is stable. Work and home life are both good.       Patient Care Team:  Charly Jackson MD as PCP - General (Internal Medicine)  Nathaniel Hurst MD as Consulting Physician (Pain Medicine)  Tommy Zimmer APRN as Nurse Practitioner (Neurology)  Aj Bone MD as Consulting Physician (Orthopedic Surgery)  Chato Allen MD as Consulting Physician (Gastroenterology)  Prabha Guerrero APRN  "as Nurse Practitioner (Nurse Practitioner)    REVIEW OF SYSTEMS     Review of Systems   GI negative for pain, melena or bleeding   Psych negative for depression    PHYSICAL EXAMINATION     Physical Exam  Alert with normal thought and judgment.   Normal affect and mood.        REVIEWED DATA     Labs:     Lab Results   Component Value Date     05/31/2023    K 4.3 05/31/2023    CALCIUM 10.2 05/31/2023    AST 21 05/31/2023    ALT 34 05/31/2023    BUN 10 05/31/2023    CREATININE 1.11 05/31/2023    CREATININE 1.21 08/17/2022    CREATININE 1.28 (H) 02/16/2022    EGFRIFNONA 67 02/16/2022    EGFRIFAFRI 78 02/16/2022       Lab Results   Component Value Date    HGBA1C 5.70 (H) 05/31/2023    HGBA1C 6.0 (H) 08/17/2022    HGBA1C 5.51 06/20/2018       Lab Results   Component Value Date     (H) 08/17/2022     (H) 02/16/2022    LDL 99 08/12/2019    HDL 43 08/17/2022    HDL 36 (L) 02/16/2022    TRIG 105 08/17/2022    TRIG 159 (H) 02/16/2022       Lab Results   Component Value Date    TSH 1.380 08/17/2022    TSH 0.867 06/20/2018    FREET4 1.29 08/17/2022    FREET4 1.10 06/20/2018       Lab Results   Component Value Date    WBC 8.39 05/31/2023    HGB 14.3 05/31/2023     05/31/2023       No results found for: \"MALBCRERATIO\"         Imaging:           Medical Tests:           Summary of old records / correspondence / consultant report:           Request outside records:       Transcribed from ambient dictation for Charly Jackson MD by Little [place].  11/30/23   09:35 EST    Patient or patient representative verbalized consent to the visit recording.  I have personally performed the services described in this document as transcribed by the above individual, and it is both accurate and complete.  Charly Jackson MD  11/30/2023  12:25 EST       "

## 2023-12-01 ENCOUNTER — DOCUMENTATION (OUTPATIENT)
Dept: INTERNAL MEDICINE | Age: 48
End: 2023-12-01
Payer: COMMERCIAL

## 2023-12-01 DIAGNOSIS — E78.5 HYPERLIPIDEMIA, UNSPECIFIED HYPERLIPIDEMIA TYPE: Primary | ICD-10-CM

## 2023-12-01 NOTE — PROGRESS NOTES
CALL PATIENT with results:    1. Cholesterol remains poorly controlled.   - verify compliance with rosuvastatin 10 mg daily  - increase rosuvastatin to 20 mg daily and recheck labs in 3 months     2. A1c level for blood sugar average remains elevated and is slightly higher than last time. Overall about the same.     3. WBC is elevated which may indicate an underlying infection  - see if he has any symptoms or signs of infection   - if not watch for developing symptoms

## 2023-12-28 DIAGNOSIS — E78.5 HYPERLIPIDEMIA, UNSPECIFIED HYPERLIPIDEMIA TYPE: Chronic | ICD-10-CM

## 2023-12-28 DIAGNOSIS — K21.00 GASTROESOPHAGEAL REFLUX DISEASE WITH ESOPHAGITIS WITHOUT HEMORRHAGE: ICD-10-CM

## 2023-12-29 RX ORDER — ROSUVASTATIN CALCIUM 10 MG/1
10 TABLET, COATED ORAL DAILY
Qty: 90 TABLET | Refills: 1 | Status: SHIPPED | OUTPATIENT
Start: 2023-12-29

## 2023-12-29 RX ORDER — PANTOPRAZOLE SODIUM 40 MG/1
40 TABLET, DELAYED RELEASE ORAL DAILY
Qty: 90 TABLET | Refills: 1 | Status: SHIPPED | OUTPATIENT
Start: 2023-12-29

## 2024-01-15 ENCOUNTER — OFFICE VISIT (OUTPATIENT)
Dept: ORTHOPEDIC SURGERY | Facility: CLINIC | Age: 49
End: 2024-01-15
Payer: COMMERCIAL

## 2024-01-15 VITALS — HEIGHT: 69 IN | BODY MASS INDEX: 29.68 KG/M2 | WEIGHT: 200.4 LBS | TEMPERATURE: 98.2 F

## 2024-01-15 DIAGNOSIS — M17.0 PRIMARY OSTEOARTHRITIS OF BOTH KNEES: Primary | ICD-10-CM

## 2024-01-15 PROCEDURE — 20610 DRAIN/INJ JOINT/BURSA W/O US: CPT | Performed by: NURSE PRACTITIONER

## 2024-01-15 RX ORDER — LIDOCAINE HYDROCHLORIDE 10 MG/ML
2 INJECTION, SOLUTION EPIDURAL; INFILTRATION; INTRACAUDAL; PERINEURAL
Status: COMPLETED | OUTPATIENT
Start: 2024-01-15 | End: 2024-01-15

## 2024-01-15 RX ORDER — METHYLPREDNISOLONE ACETATE 80 MG/ML
80 INJECTION, SUSPENSION INTRA-ARTICULAR; INTRALESIONAL; INTRAMUSCULAR; SOFT TISSUE
Status: COMPLETED | OUTPATIENT
Start: 2024-01-15 | End: 2024-01-15

## 2024-01-15 RX ADMIN — LIDOCAINE HYDROCHLORIDE 2 ML: 10 INJECTION, SOLUTION EPIDURAL; INFILTRATION; INTRACAUDAL; PERINEURAL at 13:47

## 2024-01-15 RX ADMIN — METHYLPREDNISOLONE ACETATE 80 MG: 80 INJECTION, SUSPENSION INTRA-ARTICULAR; INTRALESIONAL; INTRAMUSCULAR; SOFT TISSUE at 13:47

## 2024-01-15 NOTE — PROGRESS NOTES
Sadiq Alexander is here today for worsening Bilateral knee pain. Knee injection was discussed with the patient in detail, including indication, risks, benefits, and alternatives. Verbal consent was given for the procedure. Injection site was aseptically prepared.      KNEE Injection Procedure Note:    Large Joint Arthrocentesis: R knee  Date/Time: 1/15/2024 1:47 PM  Consent given by: patient  Site marked: site marked  Timeout: Immediately prior to procedure a time out was called to verify the correct patient, procedure, equipment, support staff and site/side marked as required   Supporting Documentation  Indications: pain, joint swelling and diagnostic evaluation   Procedure Details  Location: knee - R knee  Preparation: Patient was prepped and draped in the usual sterile fashion  Needle gauge: 21G.  Medications administered: 80 mg methylPREDNISolone acetate 80 MG/ML; 2 mL lidocaine PF 1% 1 %  Patient tolerance: patient tolerated the procedure well with no immediate complications      Large Joint Arthrocentesis: L knee  Date/Time: 1/15/2024 1:47 PM  Consent given by: patient  Site marked: site marked  Timeout: Immediately prior to procedure a time out was called to verify the correct patient, procedure, equipment, support staff and site/side marked as required   Supporting Documentation  Indications: pain   Procedure Details  Location: knee - L knee  Preparation: Patient was prepped and draped in the usual sterile fashion  Needle gauge: 21G.  Medications administered: 80 mg methylPREDNISolone acetate 80 MG/ML; 2 mL lidocaine PF 1% 1 %  Patient tolerance: patient tolerated the procedure well with no immediate complications     Sadiq Alexander tolerated the procedure well. A Bandage was applied to the injection site. At the conclusion of the injection I discussed the importance of continued quad strengthening exercises on a daily basis. I will see the patient back if the symptoms should fail to improve or  worsen.    Lola Navarro, APRN  1/15/2024    Dictated Utilizing Dragon Dictation

## 2024-01-18 ENCOUNTER — OFFICE VISIT (OUTPATIENT)
Dept: NEUROLOGY | Facility: CLINIC | Age: 49
End: 2024-01-18
Payer: COMMERCIAL

## 2024-01-18 VITALS
HEIGHT: 69 IN | DIASTOLIC BLOOD PRESSURE: 80 MMHG | WEIGHT: 204 LBS | BODY MASS INDEX: 30.21 KG/M2 | HEART RATE: 76 BPM | SYSTOLIC BLOOD PRESSURE: 128 MMHG | OXYGEN SATURATION: 98 %

## 2024-01-18 DIAGNOSIS — G43.719 INTRACTABLE CHRONIC MIGRAINE WITHOUT AURA AND WITHOUT STATUS MIGRAINOSUS: Primary | ICD-10-CM

## 2024-01-18 PROCEDURE — 99213 OFFICE O/P EST LOW 20 MIN: CPT | Performed by: NURSE PRACTITIONER

## 2024-01-18 RX ORDER — FREMANEZUMAB-VFRM 225 MG/1.5ML
1.5 INJECTION SUBCUTANEOUS
Qty: 4.5 ML | Refills: 3 | Status: SHIPPED | OUTPATIENT
Start: 2024-01-18

## 2024-01-18 RX ORDER — ZOLMITRIPTAN 5 MG/1
5 TABLET, ORALLY DISINTEGRATING ORAL ONCE AS NEEDED
Qty: 9 TABLET | Refills: 11 | Status: SHIPPED | OUTPATIENT
Start: 2024-01-18

## 2024-01-18 NOTE — LETTER
January 18, 2024       No Recipients    Patient: Sadiq Alexander   YOB: 1975   Date of Visit: 1/18/2024     Dear Charly Jackson MD:       Thank you for referring Sadiq Alexander to me for evaluation. Below are the relevant portions of my assessment and plan of care.    If you have questions, please do not hesitate to call me. I look forward to following Sadiq along with you.         Sincerely,        CIERA Tanner        CC:   No Recipients    Tommy Zimmer APRN  01/18/24 1325  Sign when Signing Visit  Subjective   Sadiq Alexander is a 48 y.o. male presenting for follow up for migraine headaches. He is taking Ajovy 225 mg every 30 days. This is working well for him. He uses Zomig as needed. This does help alleviate the headache but he does not like the side effects. At his last visit I gave him samples of Nurtec and Ubrelvy. He requested a prescription for Nurtec but unfortunately his insurance denied this. He says today both samples worked well for him with much fewer side effects than the Zomig.      Migraine     Review of Systems   Neurological:  Positive for headache. Negative for dizziness, tremors, seizures, syncope, facial asymmetry, speech difficulty, weakness, light-headedness, numbness, memory problem and confusion.     I have reviewed and confirmed the accuracy of the patient's history: Chief complaint, HPI, ROS, Subjective, and Past Family Social History as entered by the MA/TERRYN/RN. Kaila Hart MA 01/18/24      Objective     Physical Examination:  General Appearance:  Well developed, well nourished, well groomed, alert, and cooperative.  HEENT: Normocephalic.    Neck and Spine: Normal range of motion.  Normal alignment. No mass or tenderness. No bruits.  Cardiac: Regular rate and rhythm. No murmurs.  Peripheral Vasculature: Radial and pedal pulses are equal and symmetric.   Extremities: No edema or deformities. Normal joint ROM.  Skin: No rashes or birth marks.      Neurological  examination:   Higher Integrative Function: Oriented to time, place, and person. Normal registration, recall attention span and concentration. Normal language including comprehension, spontaneous speech, repetition, reading, writing, naming and vocabulary. No neglect with normal visual-spatial function and construction. Normal fund of knowledge and higher integrative function.   CN II: Pupils are equal, round and reactive to light. Normal visual acuity and visual fields.   CN III IV VI: Extraocular movements are full without nystagmus.   CN V: Normal facial sensation and strength of muscles of mastication.   CN VII: Facial movements are symmetric. No weakness.   CN VIII: Auditory acuity is normal.  CN IX & X: Symmetric palatal movement.   CN XI: Sternocleidomastoid and trapezius are normal. No weakness.   CN XII: The tongue is midline. No atrophy or fasciculations.  Motor: Normal muscle strength, bulk and tone in upper and lower extremities. No fasciculations, rigidity, spasticity, or abnormal movements.   Reflexes: 2+ in the upper and lower extremities. Plantar responses are flexor.   Sensation: Normal light touch, pinprick, vibration, temperature, and proprioception in the arms and legs.   Station and Gait: Normal gait and station.   Coordination: Finger to nose test shows no dysmetria. Rapid alternating movements are normal. Heel to shin is normal.           Assessment & Plan   There are no diagnoses linked to this encounter.

## 2024-01-18 NOTE — PROGRESS NOTES
Subjective   Sadiq Alexander is a 48 y.o. male presenting for follow up for migraine headaches. He is taking Ajovy 225 mg every 30 days. This is working well for him. He uses Zomig as needed. This does help alleviate the headache but he does not like the side effects. At his last visit I gave him samples of Nurtec and Ubrelvy. He requested a prescription for Nurtec but unfortunately his insurance denied this. He says today both samples worked well for him with much fewer side effects than the Zomig.      Migraine     Review of Systems   Neurological:  Positive for headache. Negative for dizziness, tremors, seizures, syncope, facial asymmetry, speech difficulty, weakness, light-headedness, numbness, memory problem and confusion.     I have reviewed and confirmed the accuracy of the patient's history: Chief complaint, HPI, ROS, Subjective, and Past Family Social History as entered by the MA/LPN/RN. Kaila Hart MA 01/18/24      Objective     Physical Examination:  General Appearance:  Well developed, well nourished, well groomed, alert, and cooperative.  HEENT: Normocephalic.    Neck and Spine: Normal range of motion.  Normal alignment. No mass or tenderness. No bruits.  Cardiac: Regular rate and rhythm. No murmurs.  Peripheral Vasculature: Radial and pedal pulses are equal and symmetric.   Extremities: No edema or deformities. Normal joint ROM.  Skin: No rashes or birth marks.      Neurological examination:   Higher Integrative Function: Oriented to time, place, and person. Normal registration, recall attention span and concentration. Normal language including comprehension, spontaneous speech, repetition, reading, writing, naming and vocabulary. No neglect with normal visual-spatial function and construction. Normal fund of knowledge and higher integrative function.   CN II: Pupils are equal, round and reactive to light. Normal visual acuity and visual fields.   CN III IV VI: Extraocular movements are full without  nystagmus.   CN V: Normal facial sensation and strength of muscles of mastication.   CN VII: Facial movements are symmetric. No weakness.   CN VIII: Auditory acuity is normal.  CN IX & X: Symmetric palatal movement.   CN XI: Sternocleidomastoid and trapezius are normal. No weakness.   CN XII: The tongue is midline. No atrophy or fasciculations.  Motor: Normal muscle strength, bulk and tone in upper and lower extremities. No fasciculations, rigidity, spasticity, or abnormal movements.   Reflexes: 2+ in the upper and lower extremities. Plantar responses are flexor.   Sensation: Normal light touch, pinprick, vibration, temperature, and proprioception in the arms and legs.   Station and Gait: Normal gait and station.   Coordination: Finger to nose test shows no dysmetria. Rapid alternating movements are normal. Heel to shin is normal.           Assessment & Plan   There are no diagnoses linked to this encounter.

## 2024-01-19 ENCOUNTER — SPECIALTY PHARMACY (OUTPATIENT)
Dept: NEUROLOGY | Facility: CLINIC | Age: 49
End: 2024-01-19
Payer: COMMERCIAL

## 2024-03-07 ENCOUNTER — OFFICE VISIT (OUTPATIENT)
Dept: ORTHOPEDIC SURGERY | Facility: CLINIC | Age: 49
End: 2024-03-07
Payer: COMMERCIAL

## 2024-03-07 VITALS — WEIGHT: 196.6 LBS | BODY MASS INDEX: 29.12 KG/M2 | TEMPERATURE: 98.6 F | HEIGHT: 69 IN

## 2024-03-07 DIAGNOSIS — M17.12 ARTHRITIS OF LEFT KNEE: ICD-10-CM

## 2024-03-07 DIAGNOSIS — R52 PAIN: Primary | ICD-10-CM

## 2024-03-07 DIAGNOSIS — M23.92 LOCKING OF LEFT KNEE: ICD-10-CM

## 2024-03-07 PROCEDURE — 99213 OFFICE O/P EST LOW 20 MIN: CPT | Performed by: ORTHOPAEDIC SURGERY

## 2024-03-07 NOTE — PROGRESS NOTES
Patient Name: Sadiq Alexander   YOB: 1975  Referring Primary Care Physician: Charly Jackson MD  BMI: Body mass index is 29.03 kg/m².    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 He has had chronic back pain after a back fracture and takes baclofen Flexeril and diclofenac. He had an ablation in his spine that helped at first but now is bothering him. He has gained a few pounds he is try to get active but the knee bothers him he did do some physical therapy and tries to do it on his own and he is a .     HPI:     Sadiq Alexander is a 48 y.o. male who presents today for evaluation of   Chief Complaint   Patient presents with    Left Knee - Follow-up, Pain   .  Patient follows up today and is having some trouble with his left knee.  He had diagnosis of arthritis and he had an MRI in 2021 that showed an 11 x 14 spot of cortical defect in his medial femoral condyle as well as an intraosseous lipoma.  He had some degeneration in his meniscus.  He has been getting intermittent cortisone injections which usually work but he had 1 a little over a month ago and just really has not worked well for him complains of knee giving way, locking or catching.      Subjective   Medications:   Home Medications:  Current Outpatient Medications on File Prior to Visit   Medication Sig    cetirizine (zyrTEC) 10 MG tablet Take 1 tablet by mouth Daily.    cyclobenzaprine (FLEXERIL) 10 MG tablet TAKE 1 TABLET BY MOUTH THREE TIMES A DAY (Patient taking differently: Take 1 tablet by mouth 3 (Three) Times a Day As Needed for Muscle Spasms.)    diclofenac (VOLTAREN) 50 MG EC tablet TAKE 1 TABLET BY MOUTH 2 TIMES A DAY AS NEEDED FOR PAIN    Fremanezumab-vfrm (Ajovy) 225 MG/1.5ML solution prefilled syringe Inject 1.5 mL under the skin into the appropriate area as directed Every 30 (Thirty) Days.    pantoprazole  (PROTONIX) 40 MG EC tablet Take 1 tablet by mouth Daily.    Rimegepant Sulfate (NURTEC) 75 MG tablet dispersible tablet Take 1 tablet by mouth 1 (One) Time As Needed (Migraine). Max of 1 tablet per 24 hours.    rosuvastatin (CRESTOR) 10 MG tablet Take 1 tablet by mouth Daily.    ZOLMitriptan (ZOMIG-ZMT) 5 MG disintegrating tablet Place 1 tablet on the tongue 1 (One) Time As Needed for Migraine. May repeat 1 tablet in 2 hours if needed. Max of 2 tablets in 24 hours. (Patient not taking: Reported on 3/7/2024)     No current facility-administered medications on file prior to visit.     Current Medications:  Scheduled Meds:  Continuous Infusions:No current facility-administered medications for this visit.    PRN Meds:.    I have reviewed the patient's medical history in detail and updated the computerized patient record.  Review and summarization of old records includes:    Past Medical History:   Diagnosis Date    Abnormal MRI, knee 02/04/2021    LRFT KNEE, MRI DONE AT [facility]    ADHD     Anxiety     Arthritis     Arthritis of back     Blood in stool     Bowel incontinence     Chronic midline low back pain without sciatica     Colon polyps     FOLLOWED BY DR. DOMINIQUE ALLEN    Constipation     DDD (degenerative disc disease), lumbar     Dislocation, shoulder     Environmental allergies     Fatigue     Frozen shoulder     GERD (gastroesophageal reflux disease)     Hemorrhoids 05/24/2017    Hiatal hernia 11/26/2018    Dr. Domiinque Allen.    History of traumatic head injury     Hyperlipidemia     IBS (irritable bowel syndrome)     IBS WITH BOTH CONSTIPATION & DIARRHEA.    Influenza 01/07/2014    Knee swelling     Ligamentous laxity of left knee 01/2021    Low back pain 10/10/2019    [place] [facility], Right sided low back pain without sciatica.    Lumbar facet arthropathy     Lumbar paraspinal muscle spasm     Lumbar spondylosis     Lumbar strain 09/2019    Lumbar strain 08/04/2018    SEEN AT [facility] ER    Lumbosacral disc  disease     Migraines     Multiple atypical skin moles     See's derm and has family history of melanoma.    NSAID long-term use     Osteoarthritis of lumbar spine     Perianal venous thrombosis 05/2017    Periarthritis of shoulder     Right ankle sprain 12/22/2013    RLS (restless legs syndrome)     Rotator cuff syndrome     Shoulder impingement, right 01/26/2018    FOLLOWED BY DR. PANDA ENRIQUEZ    Sprain and strain of left ankle 12/22/2013    [facility], ankle injury.    Strain of right knee 08/08/2016    FOLLOWED BY DR. PANDA ENRIQUEZ    Synovial cyst of lumbar facet joint     Tear of meniscus of knee     Thrombosed hemorrhoids 03/2022    Thumb laceration, left, initial encounter 01/08/2017    [place] [facility], cut it while cooking.        Past Surgical History:   Procedure Laterality Date    CHOLECYSTECTOMY WITH INTRAOPERATIVE CHOLANGIOGRAM N/A 02/05/2019    Procedure: CHOLECYSTECTOMY LAPAROSCOPIC INTRAOPERATIVE CHOLANGIOGRAM;  Surgeon: Reji Hoffman MD;  Location: [facility] MAIN OR;  Service: General    COLONOSCOPY N/A 03/11/2015    Two 4-6 mm polyps at the recto-sigmoid and in the mid ascending colon (PATH: tubulovillous adenoma w/ low-grade dysplasia & hyperplastic colon polyp at 20 cm), internal hemorrhoids; Dr. Chato Allen    COLONOSCOPY N/A 09/13/2018    Tortuous colon, internal hemorrhoids, Dr. Chato Allen.    ENDOSCOPY N/A 01/07/2019    IRREGULAR Z LINE AT 41CM, ACTIVE DUODENITIS, MILD ACTIVE ESOPHAGITIS, REACTIVE GASTROPATHY, DR. CHATO ALLEN AT [facility]    HEMORRHOID BANDING N/A     MEDIAL BRANCH BLOCK Bilateral 01/15/2020    medial branch block (Bilateral lumbar 3 - Lumbar 5 synovial cyst), Dr. Nathaniel Hurst.    NERVE SURGERY Bilateral 03/16/2021    Procedure: Bilateral L3-L5 RADIOFREQUENCY ABLATION LUMBAR;  Surgeon: Nathaniel Hurst MD;  Location: [facility] MAIN OR;  Service: Pain Management;  Laterality: Bilateral;    RADIOFREQUENCY ABLATION Bilateral 05/27/2020     L3-L5 Radiofrequency ablation lumbar, Surgeon Nathaniel Hurst.    RADIOFREQUENCY ABLATION Bilateral 2021    Procedure: Bilateral L3-L5 RADIOFREQUENCY ABLATION LUMBAR;  Surgeon: Nathaniel Hurst MD;  Location[facility];  Service: Pain Management;  Laterality: Bilateral;    RADIOFREQUENCY ABLATION Bilateral 2022    Procedure: Bilateral L3-L5 RADIOFREQUENCY ABLATION LUMBAR;  Surgeon: Nathaniel Hurst MD;  Location: [facility];  Service: Pain Management;  Laterality: Bilateral;    RADIOFREQUENCY ABLATION Bilateral 2023    Procedure: Bilateral L3-L5 RADIOFREQUENCY ABLATION LUMBAR;  Surgeon: Nathaniel Hurst MD;  Location: [facility];  Service: Pain Management;  Laterality: Bilateral;    RADIOFREQUENCY ABLATION Bilateral 2023    Procedure: Bilateral L3-L5 radiofrequency ablation 27207; 66891;  Surgeon: Nathaniel Hurst MD;  Location: [facility];  Service: Pain Management;  Laterality: Bilateral;    REFRACTIVE SURGERY Bilateral     LASIK    SHOULDER ACROMIOPLASTY Right 2015    WITH DEBRIDEMENT OF LABRUM, DR. PANDA ENRIQUEZ    SHOULDER ARTHROSCOPY W/ LABRAL REPAIR Left 2005    DR. PANDA ENRIQUEZ    SHOULDER SURGERY  2015    STEROID INJECTION KNEE Left 02/10/2021    DR. VEDA LUIS    THROMBECTOMY N/A 2017    Procedure; External hemorrhoid thrombectomy, DR. BAMBI DONALD        Social History     Occupational History    Occupation: Software consulting   Tobacco Use    Smoking status: Former     Current packs/day: 0.00     Types: Cigarettes     Quit date:      Years since quittin.2     Passive exposure: Past    Smokeless tobacco: Former     Types: Chew     Quit date: 3/8/2019    Tobacco comments:     smokes about 1 pack / week   Vaping Use    Vaping status: Never Used   Substance and Sexual Activity    Alcohol use: Yes     Alcohol/week: 3.0 standard drinks of alcohol     Types: 2 Cans of beer, 1 Drinks containing 0.5 oz of alcohol per week     Comment: 4  days (1-2 weeks)    Drug use: Not Currently     Comment: used marijuana in the past in college    Sexual activity: Yes     Partners: Female     Birth control/protection: Surgical      Social History     Social History Narrative    Not on file        Family History   Problem Relation Age of Onset    No Known Problems Mother     Melanoma Father     Depression Father     No Known Problems Sister     Parkinsonism Maternal Grandmother     Arthritis Maternal Grandmother     Migraine headaches Maternal Grandmother     Lung cancer Maternal Grandfather         smoker    Andrew's disease Maternal Grandfather     Cancer Maternal Grandfather     Migraine headaches Maternal Grandfather     Lung cancer Paternal Grandmother         smoker    Cancer Paternal Grandmother     Migraine headaches Paternal Grandmother     Cancer Maternal Aunt         spine    Lung cancer Paternal Aunt         smoker    Cancer Paternal Aunt     Heart attack Paternal Uncle 55    Colon cancer Neg Hx     Prostate cancer Neg Hx        ROS: 14 point review of systems was performed and all other systems were reviewed and are negative except for documented findings in HPI and today's encounter.     Allergies: No Known Allergies  Constitutional:  Denies fever, shaking or chills   Eyes:  Denies change in visual acuity   HENT:  Denies nasal congestion or sore throat   Respiratory:  Denies cough or shortness of breath   Cardiovascular:  Denies chest pain or severe LE edema   GI:  Denies abdominal pain, nausea, vomiting, bloody stools or diarrhea   Musculoskeletal:  Numbness, tingling, pain, or loss of motor function only as noted above in history of present illness.  : Denies painful urination or hematuria  Integument:  Denies rash, lesion or ulceration   Neurologic:  Denies headache or focal weakness  Endocrine:  Denies lymphadenopathy  Psych:  Denies confusion or change in mental status   Hem:  Denies active bleeding    OBJECTIVE:  Physical Exam: 48 y.o.  "male  Wt Readings from Last 3 Encounters:   03/07/24 89.2 kg (196 lb 9.6 oz)   01/18/24 92.5 kg (204 lb)   01/15/24 90.9 kg (200 lb 6.4 oz)     Ht Readings from Last 1 Encounters:   03/07/24 175.3 cm (69\")     Body mass index is 29.03 kg/m².  Vitals:    03/07/24 0923   Temp: 98.6 °F (37 °C)     Vital signs reviewed.     General Appearance:    Alert, cooperative, in no acute distress                  Eyes: conjunctiva clear  ENT: external ears and nose atraumatic  CV: no peripheral edema  Resp: normal respiratory effort  Skin: no rashes or wounds; normal turgor  Psych: mood and affect appropriate  Lymph: no nodes appreciated  Neuro: gross sensation intact  Vascular:  Palpable peripheral pulse in noted extremity  Musculoskeletal Extremities: Exam today shows pretty good range of motion he has mild swelling Marilyn's causes a repetitive repetitive click and pop medially this both audible and palpable and recreate some of the pain he is having hips nontender to rotation or axial loading    Radiology: 2021: From 2021: IMPRESSION:  1. Mild arthritic changes at the medial compartment with 11 x 4 mm  articular cartilage defect weight-bearing surface medial femoral condyle  with underlying subchondral edema. There is degenerative signal within  the posterior aspect of the medial meniscus without evidence for tear or  meniscal displacement.  2. 15 mm intraosseus lipoma medial femoral condyle.     This report was finalized on 2/4/2021 9:58 AM by Dr. Kadeem Chavez,      AP lateral 40 degree PA x-ray left knee taken the office today with comparison view from a year ago shows pretty good joint spaces being seen on the lateral the cortical irregularity.        Assessment:     ICD-10-CM ICD-9-CM   1. Pain  R52 780.96   2. Locking of left knee  M23.92 717.9   3. Arthritis of left knee  M17.12 716.96        MDM/Plan:   The diagnosis(es), natural history, pathophysiology and treatment for diagnosis(es) were discussed. Opportunity " given and questions answered.  Biomechanics of pertinent body areas discussed.  When appropriate, the use of ambulatory aids discussed.    Biomechanics of pertinent body areas discussed.  When appropriate, the use of ambulatory aids discussed.  Inflammation/pain control; with cold, heat, elevation and/or liniments discussed as appropriate  MRI.  Recommend MRI follow-up he may have developed a meniscal tear.  Will see what the MRI shows and go from there    3/7/2024    Dictated utilizing Dragon dictation

## 2024-03-25 ENCOUNTER — HOSPITAL ENCOUNTER (OUTPATIENT)
Dept: MRI IMAGING | Facility: HOSPITAL | Age: 49
Discharge: HOME | End: 2024-03-25
Admitting: ORTHOPAEDIC SURGERY
Payer: COMMERCIAL

## 2024-03-25 DIAGNOSIS — M23.92 LOCKING OF LEFT KNEE: ICD-10-CM

## 2024-03-25 PROCEDURE — 73721 MRI JNT OF LWR EXTRE W/O DYE: CPT

## 2024-04-27 ENCOUNTER — PATIENT ROUNDING (BHMG ONLY) (OUTPATIENT)
Age: 49
End: 2024-04-27
Payer: COMMERCIAL

## 2024-04-27 NOTE — ED NOTES
Thank you for letting us care for you in your recent visit to our [facility] urgent care center. We would love to hear about your experience with us. Was this the first time you have visited our location?    We’re always looking for ways to make our patients’ experiences even better. Do you have any recommendations on ways we may improve?     I appreciate you taking the time to respond. Please be on the lookout for a survey about your recent visit from [facility] [employer] via text or email. We would greatly appreciate if you could fill that out and turn it back in. We want your voice to be heard and we value your feedback.   Thank you for choosing [facility] for your healthcare needs.     If you have concerns or would like to speak to me in person regarding your visit please feel free to give me a call. [phone]    Hope you get well soon and thank you.    Jeronimo Del Toro LPN Practice Manager

## 2024-05-21 NOTE — PROGRESS NOTES
Detail Level: Zone Subjective:     Patient ID: Sadiq Alexander is a 43 y.o. male presenting for follow-up for migraine headaches.  I saw the patient for initial evaluation on April 25, 2019.  He was taking Trokendi at the time but was having quite a few side effects and continued to have frequent migraine headaches.  I had him discontinue this medication and he feels much better since doing so.  I started him on Ajovy 225 mg once monthly.  He is tolerating this medication very well and states his migraines have improved significantly.  He does report a history of restless leg syndrome and does feel that it has worsened over the past few months.  He states that his symptoms come and go and are worse at times but many nights do not bother him.    History of Present Illness  The following portions of the patient's history were reviewed and updated as appropriate: allergies, current medications, past family history, past medical history, past social history, past surgical history and problem list.    Review of Systems   Constitutional: Negative for activity change and appetite change.   Respiratory: Negative for choking, chest tightness and shortness of breath.    Gastrointestinal: Negative for nausea and vomiting.   Musculoskeletal: Positive for back pain and neck pain. Negative for gait problem.   Neurological: Positive for headaches. Negative for dizziness, tremors, seizures, syncope, facial asymmetry, speech difficulty, weakness, light-headedness and numbness.   Hematological: Negative for adenopathy. Does not bruise/bleed easily.   Psychiatric/Behavioral: Positive for sleep disturbance. Negative for agitation, behavioral problems, confusion, decreased concentration, dysphoric mood, hallucinations, self-injury and suicidal ideas. The patient is not nervous/anxious and is not hyperactive.         Objective:    Neurologic Exam  General: Well nourished, well developed, and in no acute distress.  HEENT: Normocephalic/atraumatic. Mucous  membranes moist. Sclerae anicteric.   Heart: Regular rate and rhythm. No murmurs, rubs or gallops.  Lungs: Clear to auscultation bilaterally.  Skin: No notable rashes or lesions on the visible surfaces.   Extremities: No clubbing, cyanosis or significant edema.   Psychiatric: Pleasant, cooperative, and appropriate.   Neurologic Exam:  Mental Status:  Alert and oriented. Speech is fluent. Comprehension is intact.   Cranial Nerves II-XII: Pupils equal, round, reactive to light. Extraocular movements are full and conjugate in all directions. Pursuit movements do not provoke any apparent dizziness or discomfort.  No nystagmus noted.  Hearing is intact to voice. Facial strength and sensation are preserved and symmetric. Tongue and palate midline. Voice non-hoarse, non-dysarthric.   Motor: Normal bulk and tone of bilateral upper and lower extremities. Strength is 5/5 in all 4 extremities both proximally and distally. There are no abnormal or involuntary movements noted.  Sensation: Intact to light touch throughout. Romberg was negative with no significant sway.   Coordination: Fully intact. Finger-to-nose performed accurately bilaterally.  Reflexes: The deep tendon reflexes are 2+/4 in bilateral biceps, brachioradialis, triceps, patella, and Achilles.  No pathologic reflexes were noted.  Gait: Normal. No ataxia or apraxia.        Physical Exam    Assessment/Plan:     Sadiq was seen today for migraine.    Diagnoses and all orders for this visit:    Intractable migraine with aura without status migrainosus    Restless legs syndrome (RLS)         The patient is doing much better on Ajovy 225 mg once monthly.  He will continue this medication.  He is no longer taking Trokendi and feels much better off of this medication as well.  Regarding his restless legs he does not wish to initiate treatment for this at this time but will call should he change his mind regarding this.    Follow-up annually, sooner if needed.

## 2024-06-04 ENCOUNTER — TRANSCRIBE ORDERS (OUTPATIENT)
Dept: SURGERY | Facility: SURGERY CENTER | Age: 49
End: 2024-06-04
Payer: COMMERCIAL

## 2024-06-04 ENCOUNTER — OFFICE VISIT (OUTPATIENT)
Dept: PAIN MEDICINE | Facility: CLINIC | Age: 49
End: 2024-06-04
Payer: COMMERCIAL

## 2024-06-04 ENCOUNTER — PREP FOR SURGERY (OUTPATIENT)
Dept: SURGERY | Facility: SURGERY CENTER | Age: 49
End: 2024-06-04
Payer: COMMERCIAL

## 2024-06-04 VITALS
TEMPERATURE: 97.8 F | BODY MASS INDEX: 29.74 KG/M2 | OXYGEN SATURATION: 97 % | HEART RATE: 81 BPM | DIASTOLIC BLOOD PRESSURE: 91 MMHG | HEIGHT: 69 IN | SYSTOLIC BLOOD PRESSURE: 126 MMHG | WEIGHT: 200.8 LBS

## 2024-06-04 DIAGNOSIS — M51.36 DEGENERATIVE LUMBAR DISC: Chronic | ICD-10-CM

## 2024-06-04 DIAGNOSIS — M47.816 LUMBAR FACET ARTHROPATHY: ICD-10-CM

## 2024-06-04 DIAGNOSIS — M54.9 CHRONIC MIDLINE BACK PAIN, UNSPECIFIED BACK LOCATION: Chronic | ICD-10-CM

## 2024-06-04 DIAGNOSIS — G89.29 CHRONIC MIDLINE BACK PAIN, UNSPECIFIED BACK LOCATION: Chronic | ICD-10-CM

## 2024-06-04 DIAGNOSIS — Z41.9 SURGERY, ELECTIVE: Primary | ICD-10-CM

## 2024-06-04 DIAGNOSIS — M47.816 LUMBAR FACET ARTHROPATHY: Primary | Chronic | ICD-10-CM

## 2024-06-04 DIAGNOSIS — M47.816 LUMBAR FACET ARTHROPATHY: Primary | ICD-10-CM

## 2024-06-04 DIAGNOSIS — G89.29 OTHER CHRONIC PAIN: ICD-10-CM

## 2024-06-04 PROCEDURE — 99214 OFFICE O/P EST MOD 30 MIN: CPT | Performed by: NURSE PRACTITIONER

## 2024-06-04 NOTE — PROGRESS NOTES
CHIEF COMPLAINT  Back pain    Subjective   Sadiq Alexander is a 48 y.o. male  who presents to the office for follow-up of procedure.  He completed a Bilateral L3-L5 radiofrequency ablation  on  8/31/23 performed by Dr. Hurst for management of back pain. Patient reports 75% relief from the procedure lasting over 6 months.     Today his pain is 3/10VAS in severity. He states that his low back pain began to return a few months ago. He describes this pain as axial low back pain that is aching, burning, cramping, shooting, and stabbing in nature. He states that primarily it is aching and cramping pain. He denies any radicular complaints. His pain is worsened with bending, twisting, walking and prolonged standing. He has previously completed PT and he continues to do stretches at home.     Procedure list:  8/31/2023-bilateral L3-L5 RFA-75% relief lasting over 6 months.  2/1/2023-bilateral L3-L5 RFA-75% relief x 5.5 months, relief is not beginning to wane.   6/16/2022-bilateral L3-L5 RFA-80% relief x ~5.5 months, relief is now beginning to wane, but has not fully returned to baseline.   11/18/2021-bilateral L3-L5 RFA-80 to 90% relief times ~5.5-6 months.   3/16/2021-bilateral L3-L5 RFA-80% relief x6 months  5/27/2020-bilateral L3-L5 RFA-60% relief    Back Pain  This is a chronic problem. The current episode started more than 1 year ago. The problem occurs constantly. The problem has been worse since onset. The pain is present in the lumbar spine. The quality of the pain is described as aching, burning and stabbing. The pain does not radiate. The pain is at a severity of 3/10. Worse during: worsens as day progresses. The symptoms are aggravated by bending, position, standing and twisting. Associated symptoms include bowel incontinence. Pertinent negatives include no abdominal pain, bladder incontinence, chest pain, dysuria, fever, headaches, numbness or weakness. He has tried chiropractic manipulation, heat, home exercises,  "analgesics, bed rest, ice, muscle relaxant, NSAIDs and walking (PT, MBB-significant diagnostic relief) for the symptoms. The treatment provided moderate (RFL) relief.      PEG Assessment   What number best describes your pain on average in the past week?4  What number best describes how, during the past week, pain has interfered with your enjoyment of life?4  What number best describes how, during the past week, pain has interfered with your general activity?  4    The following portions of the patient's history were reviewed and updated as appropriate: allergies, current medications, past family history, past medical history, past social history, past surgical history, and problem list.    Review of Systems   Constitutional:  Positive for activity change and fatigue. Negative for fever.   Cardiovascular:  Negative for chest pain.   Gastrointestinal:  Positive for bowel incontinence. Negative for abdominal pain, constipation and diarrhea.   Genitourinary:  Negative for bladder incontinence, difficulty urinating and dysuria.   Musculoskeletal:  Positive for back pain.   Neurological:  Negative for dizziness, weakness, light-headedness, numbness and headaches.   Psychiatric/Behavioral:  Negative for agitation, sleep disturbance and suicidal ideas. The patient is not nervous/anxious.      --  The aforementioned information the Chief Complaint section and above subjective data including any HPI data, and also the Review of Systems data, has been personally reviewed and affirmed.  --     Vitals:    06/04/24 0824   BP: 126/91   BP Location: Right arm   Patient Position: Sitting   Cuff Size: Adult   Pulse: 81   Temp: 97.8 °F (36.6 °C)   SpO2: 97%   Weight: 91.1 kg (200 lb 12.8 oz)   Height: 175.3 cm (69\")   PainSc:   3   PainLoc: Back     Objective   Physical Exam  Vitals and nursing note reviewed.   Constitutional:       Appearance: Normal appearance. He is well-developed.   Eyes:      General: Lids are normal. "   Cardiovascular:      Rate and Rhythm: Normal rate.   Pulmonary:      Effort: Pulmonary effort is normal.   Musculoskeletal:      Lumbar back: Tenderness and bony tenderness present. Decreased range of motion.      Comments:   +Lumbar facet loading   Neurological:      Mental Status: He is alert and oriented to person, place, and time.   Psychiatric:         Attention and Perception: Attention normal.         Mood and Affect: Mood normal.         Speech: Speech normal.         Behavior: Behavior normal.         Judgment: Judgment normal.       Assessment & Plan   Diagnoses and all orders for this visit:    1. Lumbar facet arthropathy (Primary)    2. Chronic midline back pain, unspecified back location    3. Degenerative lumbar disc        Sadiq Alexander reports a pain score of 3.  Given his pain assessment as noted, treatment options were discussed and the following options were decided upon as a follow-up plan to address the patient's pain: continuation of current treatment plan for pain.    --- Repeat Bilateral L3-L5 RFA. He notes that he would like to try this without sedation as he does not like how it makes him feel the rest of the day. Reviewed that this is reasonable. Reviewed that there will likely be some procedural discomfort, but that sedation is not a requirement with this procedure. He states understanding.      Reviewed the procedure at length with the patient.  Included in the review was expectations, complications, risk and benefits.The procedure was described in detail and the risks, benefits and alternatives were discussed with the patient (including but not limited to: bleeding, infection, nerve damage, worsening of pain, inability to perform injection, paralysis, seizures, coma, no pain relief and death) who agreed to proceed.  Discussed the potential for sedation if warranted/wanted.  The procedure will plan to be performed at [facility] with fluoroscopic guidance(unless  ultrasound is indicated) and could potentially have steroids and contrast dye used. Questions were answered and in a way the patient could understand.  Patient verbalized understanding and wishes to proceed.  This intervention will be ordered.  Discussed with patient that all procedures are part of a multimodal plan of care and include either formal PT or a home exercise program.  Patient has no evidence of coagulopathy or current infection.    --- Follow-up after procedure     Dictated utilizing Dragon dictation.

## 2024-06-05 ENCOUNTER — OFFICE VISIT (OUTPATIENT)
Dept: ORTHOPEDIC SURGERY | Facility: CLINIC | Age: 49
End: 2024-06-05
Payer: COMMERCIAL

## 2024-06-05 VITALS — WEIGHT: 199.4 LBS | BODY MASS INDEX: 29.53 KG/M2 | TEMPERATURE: 98.4 F | HEIGHT: 69 IN

## 2024-06-05 DIAGNOSIS — M17.0 PRIMARY OSTEOARTHRITIS OF BOTH KNEES: Primary | ICD-10-CM

## 2024-06-05 RX ORDER — METHYLPREDNISOLONE ACETATE 80 MG/ML
80 INJECTION, SUSPENSION INTRA-ARTICULAR; INTRALESIONAL; INTRAMUSCULAR; SOFT TISSUE
Status: COMPLETED | OUTPATIENT
Start: 2024-06-05 | End: 2024-06-05

## 2024-06-05 RX ORDER — LIDOCAINE HYDROCHLORIDE 10 MG/ML
2 INJECTION, SOLUTION EPIDURAL; INFILTRATION; INTRACAUDAL; PERINEURAL
Status: COMPLETED | OUTPATIENT
Start: 2024-06-05 | End: 2024-06-05

## 2024-06-05 RX ADMIN — METHYLPREDNISOLONE ACETATE 80 MG: 80 INJECTION, SUSPENSION INTRA-ARTICULAR; INTRALESIONAL; INTRAMUSCULAR; SOFT TISSUE at 09:01

## 2024-06-05 RX ADMIN — LIDOCAINE HYDROCHLORIDE 2 ML: 10 INJECTION, SOLUTION EPIDURAL; INFILTRATION; INTRACAUDAL; PERINEURAL at 09:01

## 2024-06-05 NOTE — PROGRESS NOTES
Sadiq Alexander is here today for worsening Bilateral knee pain. Knee injection was discussed with the patient in detail, including indication, risks, benefits, and alternatives. Verbal consent was given for the procedure. Injection site was aseptically prepared.      KNEE Injection Procedure Note:    Large Joint Arthrocentesis: R knee  Date/Time: 6/5/2024 9:01 AM  Consent given by: patient  Site marked: site marked  Timeout: Immediately prior to procedure a time out was called to verify the correct patient, procedure, equipment, support staff and site/side marked as required   Supporting Documentation  Indications: pain, joint swelling and diagnostic evaluation   Procedure Details  Location: knee - R knee  Preparation: Patient was prepped and draped in the usual sterile fashion  Needle gauge: 21G.  Medications administered: 80 mg methylPREDNISolone acetate 80 MG/ML; 2 mL lidocaine PF 1% 1 %  Patient tolerance: patient tolerated the procedure well with no immediate complications      Large Joint Arthrocentesis: L knee  Date/Time: 6/5/2024 9:01 AM  Consent given by: patient  Site marked: site marked  Timeout: Immediately prior to procedure a time out was called to verify the correct patient, procedure, equipment, support staff and site/side marked as required   Supporting Documentation  Indications: pain   Procedure Details  Location: knee - L knee  Preparation: Patient was prepped and draped in the usual sterile fashion  Needle gauge: 21G.  Medications administered: 80 mg methylPREDNISolone acetate 80 MG/ML; 2 mL lidocaine PF 1% 1 %  Patient tolerance: patient tolerated the procedure well with no immediate complications     Sadiq Alexander tolerated the procedure well. A Bandage was applied to the injection site. At the conclusion of the injection I discussed the importance of continued quad strengthening exercises on a daily basis. I will see the patient back if the symptoms should fail to improve or worsen.    Lola  CIERA Navarro  6/5/2024    Dictated Utilizing Dragon Dictation

## 2024-08-07 RX ORDER — MIDAZOLAM HYDROCHLORIDE 2 MG/2ML
2 INJECTION, SOLUTION INTRAMUSCULAR; INTRAVENOUS ONCE
Status: DISCONTINUED | OUTPATIENT
Start: 2024-08-08 | End: 2024-08-08 | Stop reason: HOSPADM

## 2024-08-07 RX ORDER — FENTANYL CITRATE 50 UG/ML
100 INJECTION, SOLUTION INTRAMUSCULAR; INTRAVENOUS ONCE
Status: DISCONTINUED | OUTPATIENT
Start: 2024-08-08 | End: 2024-08-08 | Stop reason: HOSPADM

## 2024-08-08 ENCOUNTER — HOSPITAL ENCOUNTER (OUTPATIENT)
Facility: SURGERY CENTER | Age: 49
Setting detail: HOSPITAL OUTPATIENT SURGERY
Discharge: HOME | End: 2024-08-08
Attending: ANESTHESIOLOGY | Admitting: ANESTHESIOLOGY
Payer: COMMERCIAL

## 2024-08-08 ENCOUNTER — HOSPITAL ENCOUNTER (OUTPATIENT)
Dept: GENERAL RADIOLOGY | Facility: SURGERY CENTER | Age: 49
End: 2024-08-08
Payer: COMMERCIAL

## 2024-08-08 VITALS
RESPIRATION RATE: 16 BRPM | SYSTOLIC BLOOD PRESSURE: 116 MMHG | BODY MASS INDEX: 28.88 KG/M2 | OXYGEN SATURATION: 96 % | HEART RATE: 69 BPM | TEMPERATURE: 97.7 F | HEIGHT: 69 IN | DIASTOLIC BLOOD PRESSURE: 89 MMHG | WEIGHT: 195 LBS

## 2024-08-08 DIAGNOSIS — Z41.9 SURGERY, ELECTIVE: ICD-10-CM

## 2024-08-08 PROCEDURE — 64635 DESTROY LUMB/SAC FACET JNT: CPT | Performed by: ANESTHESIOLOGY

## 2024-08-08 PROCEDURE — 25010000002 BUPIVACAINE (PF) 0.5 % SOLUTION 10 ML VIAL: Performed by: ANESTHESIOLOGY

## 2024-08-08 PROCEDURE — 64636 DESTROY L/S FACET JNT ADDL: CPT | Performed by: ANESTHESIOLOGY

## 2024-08-08 PROCEDURE — 76000 FLUOROSCOPY <1 HR PHYS/QHP: CPT

## 2024-08-08 PROCEDURE — S0260 H&P FOR SURGERY: HCPCS | Performed by: ANESTHESIOLOGY

## 2024-08-08 NOTE — DISCHARGE INSTRUCTIONS
[facility] - Post-procedure Instructions          --  While there are no absolute restrictions, it is recommended that you do not perform strenuous activity today. In the morning, you may resume your level of activity as before your block.    --  If you have a band-aid at your injection site, please remove it later today. Observe the area for any redness, swelling, pus-like drainage, or a temperature over 101°. If any of these symptoms occur, please call your doctor at [phone]. If after office hours, leave a message and the on-call provider will return your call.    --  Ice may be applied to your injection site. It is recommended you avoid direct heat (heating pad; hot tub) for 1-2 days.    --  Call [facility] at [phone] if you experience persistent headache, persistent bleeding from the injection site, or severe pain not relieved by heat or oral medication.    --  Do not make important decisions today.    --  Due to the effects of the block and/or the I.V. Sedation, DO NOT drive or operate hazardous machinery for 12 hours.  Local anesthetics may cause numbness after procedure and precautions must be taken with regards to operating equipment as well as with walking, even if ambulating with assistance of another person or with an assistive device.    --  Do not drink alcohol for 12 hours.    -- You may return to work tomorrow, or as directed by your referring doctor.    --  Occasionally you may notice a slight increase in your pain after the procedure. This should start to improve within the next 24-48 hours. Radiofrequency ablation procedure pain may last 3-4 weeks.    --  It may take as long as 3-4 days before you notice a gradual improvement in your pain and/or other symptoms.    -- You may continue to take your prescribed pain medication as needed.    --  Some normal possible side effects of steroid use could include fluid retention, increased blood sugar, dull headache,  increased sweating, increased appetite, mood swings and flushing.    --  Diabetics are recommended to watch their blood glucose level closely for 24-48 hours after the injection.    --  Must stay in PACU for 20 min upon arrival and prove no leg weakness before being discharged.    --  IN THE EVENT OF A LIFE THREATENING EMERGENCY, (CHEST PAIN, BREATHING DIFFICULTIES, PARALYSIS…) YOU SHOULD GO TO YOUR NEAREST EMERGENCY ROOM.    --  You should be contacted by our office within 2-3 days to schedule follow up or next appointment date.  If not contacted within 7 days, please call the office at [phone]

## 2024-08-08 NOTE — H&P
Brief Pre-procedural / Pre-operative H&P        -----    Pertinent Diagnosis:   Lumbar facet arthropathy.  Lumbar spondylosis    Proposed Procedure: Lumbar medial branch radiofrequency ablation      Subjective   Sadiq Alexander is a 49 y.o. male  who presents for intervention.  He has a history of back pain.      History of Present Illness     He had 75% relief for greater than 6 months from previous radiofrequency ablation.  He had radiofrequency ablation so many times over many years always with 75 to 90% relief for many months.  With so many significant therapeutic effect it is very reasonable to repeat the procedure because of the return of the same pain.    Aching pain in with some burning elements and some stabbing elements across the low back and a lack of a pallavi radicular component.        -------    The following portions of the patient's history were reviewed and updated as appropriate: allergies, current medications, past family history, past medical history, past social history, past surgical history and problem list.    No Known Allergies      Current Facility-Administered Medications:     fentaNYL citrate (PF) (SUBLIMAZE) injection 100 mcg, 100 mcg, Intravenous, Once, Nathaniel Hurst MD    Midazolam HCl (PF) (VERSED) injection 2 mg, 2 mg, Intravenous, Once, Nathaniel Hurst MD    No current facility-administered medications on file prior to encounter.     Current Outpatient Medications on File Prior to Encounter   Medication Sig Dispense Refill    cetirizine (zyrTEC) 10 MG tablet Take 1 tablet by mouth Daily.      pantoprazole (PROTONIX) 40 MG EC tablet Take 1 tablet by mouth Daily. 90 tablet 1    rosuvastatin (CRESTOR) 10 MG tablet Take 1 tablet by mouth Daily. 90 tablet 1    cyclobenzaprine (FLEXERIL) 10 MG tablet TAKE 1 TABLET BY MOUTH THREE TIMES A DAY (Patient taking differently: Take 1 tablet by mouth 3 (Three) Times a Day As Needed for Muscle Spasms.) 90 tablet 3    Fremanezumab-vfrm (Ajovy)  225 MG/1.5ML solution prefilled syringe Inject 1.5 mL under the skin into the appropriate area as directed Every 30 (Thirty) Days. 4.5 mL 3    Rimegepant Sulfate (NURTEC) 75 MG tablet dispersible tablet Take 1 tablet by mouth 1 (One) Time As Needed (Migraine). Max of 1 tablet per 24 hours. 8 tablet 3    ZOLMitriptan (ZOMIG-ZMT) 5 MG disintegrating tablet Place 1 tablet on the tongue 1 (One) Time As Needed for Migraine. May repeat 1 tablet in 2 hours if needed. Max of 2 tablets in 24 hours. (Patient not taking: Reported on 6/5/2024) 9 tablet 11         Lumbar facet arthropathy       Past Medical History:   Diagnosis Date    Abnormal MRI, knee 02/04/2021    LRFT KNEE, MRI DONE AT [facility]    ADHD     Anxiety     Arthritis     Arthritis of back     Blood in stool     Bowel incontinence     Chronic midline low back pain without sciatica     Colon polyps     FOLLOWED BY DR. DOMINIQUE ALLEN    Constipation     DDD (degenerative disc disease), lumbar     Dislocation, shoulder     Environmental allergies     Fatigue     Frozen shoulder     GERD (gastroesophageal reflux disease)     Hemorrhoids 05/24/2017    Hiatal hernia 11/26/2018    Dr. Dominique Allen.    History of traumatic head injury     Hyperlipidemia     IBS (irritable bowel syndrome)     IBS WITH BOTH CONSTIPATION & DIARRHEA.    Influenza 01/07/2014    Knee swelling     Ligamentous laxity of left knee 01/2021    Low back pain 10/10/2019    [facility], Right sided low back pain without sciatica.    Lumbar facet arthropathy     Lumbar paraspinal muscle spasm     Lumbar spondylosis     Lumbar strain 09/2019    Lumbar strain 08/04/2018    SEEN AT [facility] ER    Lumbosacral disc disease     Migraines     Multiple atypical skin moles     See's derm and has family history of melanoma.    NSAID long-term use     Osteoarthritis of lumbar spine     Perianal venous thrombosis 05/2017    Periarthritis of shoulder     Right ankle sprain 12/22/2013    RLS (restless legs syndrome)      Rotator cuff syndrome     Shoulder impingement, right 01/26/2018    FOLLOWED BY DR. PANDA ENRIQUEZ    Sprain and strain of left ankle 12/22/2013    [facility], ankle injury.    Strain of right knee 08/08/2016    FOLLOWED BY DR. PANDA ENRIQUEZ    Synovial cyst of lumbar facet joint     Tear of meniscus of knee     Thrombosed hemorrhoids 03/2022    Thumb laceration, left, initial encounter 01/08/2017    [facility], cut it while cooking.       Past Surgical History:   Procedure Laterality Date    CHOLECYSTECTOMY WITH INTRAOPERATIVE CHOLANGIOGRAM N/A 02/05/2019    Procedure: CHOLECYSTECTOMY LAPAROSCOPIC INTRAOPERATIVE CHOLANGIOGRAM;  Surgeon: Reji Hoffman MD;  Location: [facility] MAIN OR;  Service: General    COLONOSCOPY N/A 03/11/2015    Two 4-6 mm polyps at the recto-sigmoid and in the mid ascending colon (PATH: tubulovillous adenoma w/ low-grade dysplasia & hyperplastic colon polyp at 20 cm), internal hemorrhoids; Dr. Chato Allen    COLONOSCOPY N/A 09/13/2018    Tortuous colon, internal hemorrhoids, Dr. Chato Allen.    ENDOSCOPY N/A 01/07/2019    IRREGULAR Z LINE AT 41CM, ACTIVE DUODENITIS, MILD ACTIVE ESOPHAGITIS, REACTIVE GASTROPATHY, DR. CHATO ALLEN AT [facility]    HEMORRHOID BANDING N/A     MEDIAL BRANCH BLOCK Bilateral 01/15/2020    medial branch block (Bilateral lumbar 3 - Lumbar 5 synovial cyst), Dr. Nathaniel Hurst.    NERVE SURGERY Bilateral 03/16/2021    Procedure: Bilateral L3-L5 RADIOFREQUENCY ABLATION LUMBAR;  Surgeon: Nathaniel Hurst MD;  [place] [facility];  Service: Pain Management;  Laterality: Bilateral;    RADIOFREQUENCY ABLATION Bilateral 05/27/2020    L3-L5 Radiofrequency ablation lumbar, Surgeon Nathaniel Hurst.    RADIOFREQUENCY ABLATION Bilateral 11/18/2021    Procedure: Bilateral L3-L5 RADIOFREQUENCY ABLATION LUMBAR;  Surgeon: Nathaniel Hurst MD;  [place] [facility];  Service: Pain Management;  Laterality: Bilateral;    RADIOFREQUENCY  ABLATION Bilateral 06/16/2022    Procedure: Bilateral L3-L5 RADIOFREQUENCY ABLATION LUMBAR;  Surgeon: Nathaniel Hurst MD;  Location[facility];  Service: Pain Management;  Laterality: Bilateral;    RADIOFREQUENCY ABLATION Bilateral 02/01/2023    Procedure: Bilateral L3-L5 RADIOFREQUENCY ABLATION LUMBAR;  Surgeon: Nathaniel Hurst MD;  Location: [facility];  Service: Pain Management;  Laterality: Bilateral;    RADIOFREQUENCY ABLATION Bilateral 8/31/2023    Procedure: Bilateral L3-L5 radiofrequency ablation 98916; 61429;  Surgeon: Nathaniel Hurst MD;  Location: [facility];  Service: Pain Management;  Laterality: Bilateral;    REFRACTIVE SURGERY Bilateral 2018    LASIK    SHOULDER ACROMIOPLASTY Right 02/08/2015    WITH DEBRIDEMENT OF LABRUM, DR. PANDA ENRIQUEZ    SHOULDER ARTHROSCOPY W/ LABRAL REPAIR Left 06/2005    DR. PANDA ENRIQUEZ    SHOULDER SURGERY  2/2015    STEROID INJECTION KNEE Left 02/10/2021    DR. VEDA LUIS    THROMBECTOMY N/A 05/24/2017    Procedure; External hemorrhoid thrombectomy, DR. BAMBI DONALD       Family History   Problem Relation Age of Onset    No Known Problems Mother     Melanoma Father     Depression Father     No Known Problems Sister     Parkinsonism Maternal Grandmother     Arthritis Maternal Grandmother     Migraine headaches Maternal Grandmother     Lung cancer Maternal Grandfather         smoker    Euclid's disease Maternal Grandfather     Cancer Maternal Grandfather     Migraine headaches Maternal Grandfather     Lung cancer Paternal Grandmother         smoker    Cancer Paternal Grandmother     Migraine headaches Paternal Grandmother     Cancer Maternal Aunt         spine    Lung cancer Paternal Aunt         smoker    Cancer Paternal Aunt     Heart attack Paternal Uncle 55    Colon cancer Neg Hx     Prostate cancer Neg Hx        Social History     Socioeconomic History    Marital status:      Spouse name: Doris    Number of children: 3   Tobacco Use     "Smoking status: Former     Current packs/day: 0.00     Types: Cigarettes     Quit date:      Years since quittin.6     Passive exposure: Past    Smokeless tobacco: Former     Types: Chew     Quit date: 3/8/2019    Tobacco comments:     smokes about 1 pack / week   Vaping Use    Vaping status: Never Used   Substance and Sexual Activity    Alcohol use: Yes     Alcohol/week: 3.0 standard drinks of alcohol     Types: 2 Cans of beer, 1 Drinks containing 0.5 oz of alcohol per week     Comment: 4 days (1-2 weeks)    Drug use: Not Currently     Comment: used marijuana in the past in college    Sexual activity: Yes     Partners: Female     Birth control/protection: Surgical       -------       Review of Systems  No Fever, No Chills, No ear pain, No sinus pressure or drainage, No eye pain or drainage, No cough, No SOB, No chest tightness nor chest pain, no palpitations.          Vitals:    24 0739   BP: 118/89   BP Location: Left arm   Patient Position: Sitting   Pulse: 70   Resp: 16   Temp: 97.5 °F (36.4 °C)   TempSrc: Temporal   SpO2: 94%   Weight: 88.5 kg (195 lb)   Height: 175.3 cm (69\")         Objective   Physical Exam  VSS, NNR, NCAT, NMNA, NRD, AAOx3.  No lumbar lesions    -------    Assessment & Plan:  - as noted above, the stated intervention is indicated  - Follow-up plan will be noted in the operative report        Office 6 wk      EMR Dragon/Transcription disclaimer:   Typed items in this encounter note may have been created by electronic transcription/translation software which converts spoken language to printed text. The electronic translation of spoken language may permit erroneous, or at times, nonsensical words or phrases to be inadvertently transcribed; Although I have reviewed the note for such errors, some may still exist.      "

## 2024-08-08 NOTE — OP NOTE
Bilateral L3-5 Lumbar Medial Branch RADIOFREQUENCY  [facility]      PREOPERATIVE DIAGNOSIS:  Lumbar spondylosis without myelopathy    POSTOPERATIVE DIAGNOSIS:  Lumbar spondylosis without myelopathy    PROCEDURE:   Diagnostic Bilateral Lumbar Medial Branch Nerve thermal radiofrequency lesioning, with fluoroscopy:  L3, L4, and L5 nerves (at the L4 and L5 transverse processes and the sacral alar groove) to thermally treat the innervation to facet joints L4-5 and L5-S1  25481-24 -- Bilateral L/S facet neuro destr., 1st Level  02845-61 -- Bilateral L/S facet neuro destr., 2nd  Level    PRE-PROCEDURE DISCUSSION WITH PATIENT:    Risks and complications were discussed with the patient prior to starting the procedure and informed consent was obtained.      SURGEON:  Nathaniel Hurst MD    REASON FOR PROCEDURE:    The patient complains of pain that seems to have a significant axial component and Previous clinically significant therapeutic effect after prior Radiofrequency procedure    SEDATION:  Patient declined administration of moderate sedation        ANESTHETIC:  Lidocaine 2%  STEROID:  NONE      DESCRIPTON OF PROCEDURE:  After obtaining informed consent, IV access  was not obtained in the preoperative area.   The patient was taken to the operating room.  The patient was placed in the prone position with a pillow under the abdomen. All pressure points were well padded.  EKG, blood pressure, and pulse oximeter were monitored.  The patient was monitored and sedated by the RN under my direction. The lumbosacral area was prepped with Chloraprep and draped in a sterile fashion.     Under fluoroscopic guidance the transverse processes of the L4 and L5 vertebrae at the junctions of the superior articular processes were identified on the right.  Also identified was the groove between the ala and the superior articular process of the sacrum on the ipsilateral side.  Skin and subcutaneous tissue were  anesthetized with 1ml of 1% lidocaine above each of these points. Then, radiofrequency probe needles were advanced in this fluoro view to the above junctions.  Aspiration was negative for blood and CSF.  After confirming the position of the needle with fluoroscope in all views, testing was initiated.  First, sensory testing was started on each needle a 1V and 50Hz and slowly decreased until painful pressure stimulation diminished at 0.5V.  Next, motor testing was confirmed to be negative at 3V and 2Hz for any radicular stimulation.  Then 1mL of the local anesthetic was instilled in each needle.  Two minutes elapsed, and during this time a lateral fluoroscopic view was confirmed again to ensure the needles had not advanced nor retracted.  Then, Radiofrequency Lesioning was initiated for 3 minutes at 82 degrees Celsius.  Needles were removed intact from each of the areas.     A similar procedure was repeated to address the L3, L4, and L5 nerves on the contralateral side.   Onset of analgesia was noted.  Vital signs remained stable throughout.      ESTIMATED BLOOD LOSS:  <5 mL  SPECIMENS:  none    COMPLICATIONS:   No complications were noted. and The patient did not have any signs of postprocedure numbness nor weakness.    TOLERANCE & DISCHARGE CONDITION:    The patient tolerated the procedure well.  The patient was transported to the recovery area without difficulties.  The patient was discharged to home under the care of family in stable and satisfactory condition.    PLAN OF CARE:  The patient was given our standard instruction sheet.  The patient will  Return to clinic 6 wks.  The patient will resume all medications as per the medication reconciliation sheet.

## 2024-08-19 DIAGNOSIS — M47.816 LUMBAR FACET ARTHROPATHY: ICD-10-CM

## 2024-08-19 DIAGNOSIS — E78.5 HYPERLIPIDEMIA, UNSPECIFIED HYPERLIPIDEMIA TYPE: Chronic | ICD-10-CM

## 2024-08-19 DIAGNOSIS — K21.00 GASTROESOPHAGEAL REFLUX DISEASE WITH ESOPHAGITIS WITHOUT HEMORRHAGE: ICD-10-CM

## 2024-08-19 DIAGNOSIS — G89.29 OTHER CHRONIC PAIN: ICD-10-CM

## 2024-08-19 RX ORDER — ROSUVASTATIN CALCIUM 10 MG/1
10 TABLET, COATED ORAL DAILY
Qty: 90 TABLET | Refills: 1 | OUTPATIENT
Start: 2024-08-19

## 2024-08-19 RX ORDER — PANTOPRAZOLE SODIUM 40 MG/1
40 TABLET, DELAYED RELEASE ORAL DAILY
Qty: 90 TABLET | Refills: 1 | OUTPATIENT
Start: 2024-08-19

## 2024-08-23 ENCOUNTER — OFFICE VISIT (OUTPATIENT)
Dept: INTERNAL MEDICINE | Age: 49
End: 2024-08-23
Payer: COMMERCIAL

## 2024-08-23 VITALS
DIASTOLIC BLOOD PRESSURE: 78 MMHG | WEIGHT: 191 LBS | OXYGEN SATURATION: 99 % | RESPIRATION RATE: 18 BRPM | HEIGHT: 69 IN | BODY MASS INDEX: 28.29 KG/M2 | HEART RATE: 78 BPM | TEMPERATURE: 97.8 F | SYSTOLIC BLOOD PRESSURE: 125 MMHG

## 2024-08-23 DIAGNOSIS — R73.03 PREDIABETES: Chronic | ICD-10-CM

## 2024-08-23 DIAGNOSIS — R41.840 ATTENTION DEFICIT: ICD-10-CM

## 2024-08-23 DIAGNOSIS — E78.5 HYPERLIPIDEMIA, UNSPECIFIED HYPERLIPIDEMIA TYPE: Primary | Chronic | ICD-10-CM

## 2024-08-23 DIAGNOSIS — K21.00 GASTROESOPHAGEAL REFLUX DISEASE WITH ESOPHAGITIS WITHOUT HEMORRHAGE: Chronic | ICD-10-CM

## 2024-08-23 PROCEDURE — 99214 OFFICE O/P EST MOD 30 MIN: CPT | Performed by: INTERNAL MEDICINE

## 2024-08-23 RX ORDER — PANTOPRAZOLE SODIUM 40 MG/1
40 TABLET, DELAYED RELEASE ORAL DAILY
Qty: 90 TABLET | Refills: 1 | Status: SHIPPED | OUTPATIENT
Start: 2024-08-23

## 2024-08-23 NOTE — ASSESSMENT & PLAN NOTE
I strongly counseled him to consider smoking cessation.  He is reluctant to take Wellbutrin or Chantix.  He could consider hypnosis therapy.  He may also consider nicotine replacement therapy.

## 2024-08-23 NOTE — ASSESSMENT & PLAN NOTE
Advised him to consider testing for ADHD.  We discussed how his attention problem is probably affecting his physical health.

## 2024-08-23 NOTE — ASSESSMENT & PLAN NOTE
Lab Results   Component Value Date    HGBA1C 5.80 (H) 11/30/2023    HGBA1C 5.70 (H) 05/31/2023    HGBA1C 6.0 (H) 08/17/2022       Will check A1c level 1 week prior to follow-up in 6 months.

## 2024-08-23 NOTE — ASSESSMENT & PLAN NOTE
Lab Results   Component Value Date     (H) 08/22/2024     (H) 11/30/2023     (H) 08/17/2022    TRIG 289 (H) 08/22/2024    CHOLHDLRATIO 6.73 08/22/2024       LDL cholesterol is higher.  Poor compliance with rosuvastatin 10 mg.  I advised him to take the medication every day.  Recheck fasting labs 1 week prior to follow-up in 6 months.

## 2024-08-23 NOTE — PROGRESS NOTES
I N T E R N A L  M E D I C I N E    J U N O H  K I M,  M D      ENCOUNTER DATE:  08/23/2024    Sadiq Alexander / 49 y.o. / male    CHIEF COMPLAINT / REASON FOR OFFICE VISIT     Hypertension, Prediabetes, Hyperlipidemia, and Heartburn      ASSESSMENT & PLAN     Problem List Items Addressed This Visit          High    Hyperlipidemia - Primary (Chronic)    Current Assessment & Plan     Lab Results   Component Value Date     (H) 08/22/2024     (H) 11/30/2023     (H) 08/17/2022    TRIG 289 (H) 08/22/2024    CHOLHDLRATIO 6.73 08/22/2024       LDL cholesterol is higher.  Poor compliance with rosuvastatin 10 mg.  I advised him to take the medication every day.  Recheck fasting labs 1 week prior to follow-up in 6 months.         Relevant Medications    rosuvastatin (CRESTOR) 10 MG tablet    Other Relevant Orders    CT Cardiac Calcium Score Without Dye    Lipid Panel With / Chol / HDL Ratio    Comprehensive Metabolic Panel    Attention deficit    Current Assessment & Plan     Advised him to consider testing for ADHD.  We discussed how his attention problem is probably affecting his physical health.            Medium    GERD with esophagitis (Chronic)    Overview     Continue pantoprazole 40 mg qd.          Relevant Medications    pantoprazole (PROTONIX) 40 MG EC tablet    Prediabetes (Chronic)    Current Assessment & Plan     Lab Results   Component Value Date    HGBA1C 5.80 (H) 11/30/2023    HGBA1C 5.70 (H) 05/31/2023    HGBA1C 6.0 (H) 08/17/2022       Will check A1c level 1 week prior to follow-up in 6 months.         Relevant Orders    Hemoglobin A1c     Orders Placed This Encounter   Procedures    CT Cardiac Calcium Score Without Dye    Lipid Panel With / Chol / HDL Ratio    Comprehensive Metabolic Panel    Hemoglobin A1c     New Medications Ordered This Visit   Medications    pantoprazole (PROTONIX) 40 MG EC tablet     Sig: Take 1 tablet by mouth Daily.     Dispense:  90 tablet     Refill:  1  "      SUMMARY/DISCUSSION      Next Appointment with me: Visit date not found    Return in about 6 months (around 2/23/2025) for Reassess chronic medical problems, FASTING LABS 1 WEEK PRIOR TO FOLLOWUP.      VITAL SIGNS     Vitals:    08/23/24 1527   BP: 125/78   Pulse: 78   Resp: 18   Temp: 97.8 °F (36.6 °C)   SpO2: 99%   Weight: 86.6 kg (191 lb)   Height: 175.3 cm (69.02\")       BP Readings from Last 3 Encounters:   08/23/24 125/78   08/08/24 116/89   06/04/24 126/91     Wt Readings from Last 3 Encounters:   08/23/24 86.6 kg (191 lb)   08/08/24 88.5 kg (195 lb)   06/05/24 90.4 kg (199 lb 6.4 oz)     Body mass index is 28.19 kg/m².    Blood pressure readings recorded on patient flowsheet:       No data to display                  MEDICATIONS AT THE TIME OF OFFICE VISIT     Current Outpatient Medications on File Prior to Visit   Medication Sig    cetirizine (zyrTEC) 10 MG tablet Take 1 tablet by mouth Daily.    cyclobenzaprine (FLEXERIL) 10 MG tablet TAKE 1 TABLET BY MOUTH THREE TIMES A DAY (Patient taking differently: Take 1 tablet by mouth 3 (Three) Times a Day As Needed for Muscle Spasms.)    diclofenac (VOLTAREN) 50 MG EC tablet Take 1 tablet by mouth 2 (Two) Times a Day.    Fremanezumab-vfrm (Ajovy) 225 MG/1.5ML solution prefilled syringe Inject 1.5 mL under the skin into the appropriate area as directed Every 30 (Thirty) Days.    Rimegepant Sulfate (NURTEC) 75 MG tablet dispersible tablet Take 1 tablet by mouth 1 (One) Time As Needed (Migraine). Max of 1 tablet per 24 hours.    rosuvastatin (CRESTOR) 10 MG tablet Take 1 tablet by mouth Daily.    [DISCONTINUED] pantoprazole (PROTONIX) 40 MG EC tablet Take 1 tablet by mouth Daily.     No current facility-administered medications on file prior to visit.          HISTORY OF PRESENT ILLNESS     He has not been compliant with rosuvastatin and LDL cholesterol is notably higher.  He continues to smoke regularly but is not committed to smoking cessation.  He is taking " "pantoprazole daily for chronic GERD.  He has history of prediabetes and most recent A1c was lower at 5.8 from a high of 6.0.  Weight remains about the same.  He recently underwent ablation for his back pain.    Patient Care Team:  Charly Jackson MD as PCP - General (Internal Medicine)  Nathaniel Hurst MD as Consulting Physician (Pain Medicine)  Tommy Zimmer APRN as Nurse Practitioner (Neurology)  Aj Bone MD as Consulting Physician (Orthopedic Surgery)  Chato Allen MD as Consulting Physician (Gastroenterology)  Prabha Guerrero APRN as Nurse Practitioner (Nurse Practitioner)    REVIEW OF SYSTEMS     Review of Systems       PHYSICAL EXAMINATION     Physical Exam  Alert with normal thought and judgment.   Poor attention/focus       REVIEWED DATA     Labs:     Lab Results   Component Value Date     08/22/2024    K 4.0 08/22/2024    CALCIUM 10.0 08/22/2024    AST 11 08/22/2024    ALT 16 08/22/2024    BUN 13 08/22/2024    CREATININE 1.20 08/22/2024    CREATININE 1.19 11/30/2023    CREATININE 1.11 05/31/2023    EGFRRESULT 74.6 08/22/2024       Lab Results   Component Value Date    HGBA1C 5.80 (H) 11/30/2023    HGBA1C 5.70 (H) 05/31/2023    HGBA1C 6.0 (H) 08/17/2022       Lab Results   Component Value Date     (H) 08/22/2024     (H) 11/30/2023     (H) 08/17/2022    HDL 37 (L) 08/22/2024    HDL 39 (L) 11/30/2023    TRIG 289 (H) 08/22/2024    TRIG 235 (H) 11/30/2023       Lab Results   Component Value Date    TSH 1.380 08/17/2022    TSH 0.867 06/20/2018    FREET4 1.29 08/17/2022    FREET4 1.10 06/20/2018       Lab Results   Component Value Date    WBC 13.17 (H) 11/30/2023    HGB 15.4 11/30/2023     11/30/2023       No results found for: \"MALBCRERATIO\"          Imaging:               Medical Tests:               Summary of old records / correspondence / consultant report:             Request outside records:       "

## 2024-09-23 DIAGNOSIS — M47.816 LUMBAR FACET ARTHROPATHY: ICD-10-CM

## 2024-09-23 DIAGNOSIS — G89.29 OTHER CHRONIC PAIN: ICD-10-CM

## 2024-09-23 RX ORDER — FREMANEZUMAB-VFRM 225 MG/1.5ML
1.5 INJECTION SUBCUTANEOUS
Qty: 4.5 ML | Refills: 3 | Status: SHIPPED | OUTPATIENT
Start: 2024-09-23

## 2024-09-27 DIAGNOSIS — E78.5 HYPERLIPIDEMIA, UNSPECIFIED HYPERLIPIDEMIA TYPE: Chronic | ICD-10-CM

## 2024-09-27 RX ORDER — ROSUVASTATIN CALCIUM 10 MG/1
TABLET, COATED ORAL
Qty: 90 TABLET | Refills: 1 | Status: SHIPPED | OUTPATIENT
Start: 2024-09-27

## 2024-12-17 DIAGNOSIS — Z79.1 LONG TERM (CURRENT) USE OF NON-STEROIDAL ANTI-INFLAMMATORIES (NSAID): Primary | ICD-10-CM

## 2024-12-17 DIAGNOSIS — M47.816 LUMBAR FACET ARTHROPATHY: ICD-10-CM

## 2024-12-17 DIAGNOSIS — G89.29 OTHER CHRONIC PAIN: ICD-10-CM

## 2024-12-17 NOTE — TELEPHONE ENCOUNTER
He is due for lab work per policy. He can complete this at his earliest convenience. Will refill x 1.

## 2024-12-17 NOTE — TELEPHONE ENCOUNTER
Medication Refill Request    Date of phone call: 12-17-24    Medication being requested: diclofenac 50 mg  sig: Take 1 tablet by mouth 2 (Two) Times a Day   Qty: 180    Date of last visit: 6-4-24    Date of last refill:     JEIMY up to date?:     Next Follow up?:     Any new pertinent information? (i.e, new medication allergies, new use of medications, change in patient's health or condition, non-compliance or inconsistency with prescribing agreement?):

## 2024-12-18 ENCOUNTER — OFFICE VISIT (OUTPATIENT)
Dept: ORTHOPEDIC SURGERY | Facility: CLINIC | Age: 49
End: 2024-12-18
Payer: COMMERCIAL

## 2024-12-18 VITALS — TEMPERATURE: 97.5 F | BODY MASS INDEX: 28.19 KG/M2 | HEIGHT: 69 IN | WEIGHT: 190.3 LBS

## 2024-12-18 DIAGNOSIS — M17.0 PRIMARY OSTEOARTHRITIS OF BOTH KNEES: Primary | ICD-10-CM

## 2024-12-18 RX ADMIN — LIDOCAINE HYDROCHLORIDE 2 ML: 10 INJECTION, SOLUTION EPIDURAL; INFILTRATION; INTRACAUDAL; PERINEURAL at 16:19

## 2024-12-18 RX ADMIN — METHYLPREDNISOLONE ACETATE 80 MG: 80 INJECTION, SUSPENSION INTRA-ARTICULAR; INTRALESIONAL; INTRAMUSCULAR; SOFT TISSUE at 16:19

## 2024-12-18 RX ADMIN — METHYLPREDNISOLONE ACETATE 80 MG: 80 INJECTION, SUSPENSION INTRA-ARTICULAR; INTRALESIONAL; INTRAMUSCULAR; SOFT TISSUE at 16:18

## 2024-12-18 RX ADMIN — LIDOCAINE HYDROCHLORIDE 2 ML: 10 INJECTION, SOLUTION EPIDURAL; INFILTRATION; INTRACAUDAL; PERINEURAL at 16:18

## 2024-12-18 NOTE — PROGRESS NOTES
Patient ID: Sadiq Alexander     Chief Complaint:    Chief Complaint   Patient presents with    Left Knee - Establish Care, Pain, Initial Evaluation    Right Knee - Establish Care, Pain, Initial Evaluation        HPI:    Sadiq Alexander is a 49 y.o. who presents today for evaluation of bilateral knee pain.  Patient is been seen previously diagnosed with arthritis had previous MRI had some degenerative changes of the meniscus as well as ACL.  Some bone edema.  Skin previous injections feels that that does help his knees.  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  Is a very active individual.  Feels his symptoms have returned he did respond well to previous injections as well as her other conservative treatments.  The pain is fairly generalized worse increased activity better with rest.    Social History     Socioeconomic History    Marital status:      Spouse name: Doris    Number of children: 3   Tobacco Use    Smoking status: Former     Current packs/day: 0.00     Types: Cigarettes     Quit date:      Years since quittin.9     Passive exposure: Past    Smokeless tobacco: Former     Types: Chew     Quit date: 3/8/2019    Tobacco comments:     smokes about 1 pack / week   Vaping Use    Vaping status: Never Used   Substance and Sexual Activity    Alcohol use: Yes     Alcohol/week: 3.0 standard drinks of alcohol     Types: 2 Cans of beer, 1 Drinks containing 0.5 oz of alcohol per week     Comment: 4 days (1-2 weeks)    Drug use: Not Currently     Comment: used marijuana in the past in college    Sexual activity: Yes     Partners: Female     Birth control/protection: Surgical     Past Medical History:   Diagnosis Date    Abnormal MRI, knee 2021    LRFT KNEE, MRI DONE AT [facility]    ADHD     Anxiety     Arthritis     Arthritis of back     Blood in stool     Bowel incontinence     Chronic midline low back pain without sciatica     Colon polyps     FOLLOWED BY DR. DOMINIQUE GALDAMEZ    Constipation     DDD  (degenerative disc disease), lumbar     Dislocation, shoulder     Environmental allergies     Fatigue     Frozen shoulder     GERD (gastroesophageal reflux disease)     Hemorrhoids 05/24/2017    Hiatal hernia 11/26/2018    Dr. Chato Allen.    History of traumatic head injury     Hyperlipidemia     IBS (irritable bowel syndrome)     IBS WITH BOTH CONSTIPATION & DIARRHEA.    Influenza 01/07/2014    Knee swelling     Ligamentous laxity of left knee 01/2021    Low back pain 10/10/2019    [facility], Right sided low back pain without sciatica.    Lumbar facet arthropathy     Lumbar paraspinal muscle spasm     Lumbar spondylosis     Lumbar strain 09/2019    Lumbar strain 08/04/2018    SEEN AT [facility]    Lumbosacral disc disease     Migraines     Multiple atypical skin moles     See's derm and has family history of melanoma.    NSAID long-term use     Osteoarthritis of lumbar spine     Perianal venous thrombosis 05/2017    Periarthritis of shoulder     Right ankle sprain 12/22/2013    RLS (restless legs syndrome)     Rotator cuff syndrome     Shoulder impingement, right 01/26/2018    FOLLOWED BY DR. PANDA ENRIQUEZ    Sprain and strain of left ankle 12/22/2013    [facility], ankle injury.    Strain of right knee 08/08/2016    FOLLOWED BY DR. PANDA ENRIQUEZ    Synovial cyst of lumbar facet joint     Tear of meniscus of knee     Thrombosed hemorrhoids 03/2022    Thumb laceration, left, initial encounter 01/08/2017    [facility], cut it while cooking.     Family History   Problem Relation Age of Onset    No Known Problems Mother     Melanoma Father     Depression Father     No Known Problems Sister     Parkinsonism Maternal Grandmother     Arthritis Maternal Grandmother     Migraine headaches Maternal Grandmother     Lung cancer Maternal Grandfather         smoker    [place]'s disease Maternal Grandfather     Cancer Maternal Grandfather     Migraine headaches Maternal Grandfather     Lung cancer Paternal  "Grandmother         smoker    Cancer Paternal Grandmother     Migraine headaches Paternal Grandmother     Cancer Maternal Aunt         spine    Lung cancer Paternal Aunt         smoker    Cancer Paternal Aunt     Colon cancer Neg Hx     Prostate cancer Neg Hx        ROS:    ROS:  Constitutional:  Denies fever, shaking or chills         All other pertinent positives and negatives as noted above in HPI.    Physical Exam:     Vital Signs:  Temp 97.5 °F (36.4 °C) (Temporal)   Ht 175.3 cm (69.02\")   Wt 86.3 kg (190 lb 4.8 oz)   BMI 28.09 kg/m²   Constitutional: Awake alert and oriented x3, well developed, well nourished, no acute distress, non-toxic appearance.      Musculoskeletal:    Exam of the bilateral  knee:    No muscle atrophy, erythema, ecchymosis, or gross deformity noted  no knee effusion    Active range of motion 0-125  5/5 strength flexion and extension  The knee is stable to varus and valgus stress testing  Mild varus alignment of the limb  Lachman negative  Posterior drawer negative  Marilyn's negative  Patellofemoral grind +  Sensation grossly intact to light tough throughout the lower extremity  Skin is intact  Distal pulses are 2+  No signs or symptoms of DVT            Diagnostic Studies:     Imaging was personally and individually reviewed and discussed at length with the patient:    Previous films including MRI report was personally reviewed.    MRI LEFT KNEE WITHOUT CONTRAST     HISTORY: Anterolateral left knee pain for 3 years.     TECHNIQUE:  MRI left knee includes axial and coronal PD fat-sat as well  as sagittal PD, T1, T2-weighted sequences.     COMPARISON: MRI left knee 02/04/2021.     FINDINGS: As on the previous exam, there is PD hyperintense signal  centered at the junction the posterior and body of the medial meniscus  attributed to degenerative signal without clear tear and there is no  evidence for change. There is no meniscal displacement. The lateral  meniscus is intact.     At the " weightbearing surface medial femoral condyle there is a small, 11  x 4 mm articular cartilage defect where there is surface irregularity  minimal subchondral edema of the subchondral edema exhibits improvement  compared to the previous exam and there is no evidence for progression  of this defect.     There has developed intermediate T2 increased signal amongst ACL fibers  consistent with minimal ACL ganglion cyst formation without tear. The  PCL, collateral ligament complexes, extensor mechanism are intact. Knee  joint fluid volume is upper normal. 13 x 13 x 15 mm lipoma is present  within the cancellous bone of the medial femoral condyle without change.     IMPRESSION:  1. Overall, findings are similar to the prior exam 02/04/2021 though  there has developed mild, T2 increased signal amongst ACL fibers  consistent with very mild ACL ganglion cyst formation. The subchondral  marrow edema weightbearing surface medial femoral condyle deep to an  articular cartilage defect exhibits mild improvement. There remains what  appears to be degenerative signal within the medial meniscus and at the  junction of the posterior horn and body without change.  2. 15 mm intraosseous lipoma medial femoral condyle.     This report was finalized on 3/25/2024 10:02 AM by Dr. Kadeem Chavez M.D on Workstation: [id number]             Assessment:     bilateral  Knee Osteoarthritis, pain            Plan:     Based on x-rays, history, and office evaluation, we have diagnosed Sadiq Alexander with knee arthritis. At this time, we recommend starting with a conservative treatment program. This will consist of cortisone injection during significant flare-ups, NSAIDS for daily maintenance, physical therapy for strengthening and modalities as indicated, and bracing when appropriate. These measures will continue, until symptoms are no longer relieved, become more severe or function begins to significantly deteriorate. At that point joint  replacement options will be discussed.    Proceed with injection and conservative treatments as noted above.    Follow up in 6 to 12 months unless symptoms return or a new issue occurs.  Patient will call the office to schedule an appointment.     All questions were answered, the patient understands and agrees with the plan.             Large Joint Arthrocentesis: R knee  Date/Time: 12/18/2024 4:18 PM  Consent given by: patient  Site marked: site marked  Timeout: Immediately prior to procedure a time out was called to verify the correct patient, procedure, equipment, support staff and site/side marked as required   Supporting Documentation  Indications: pain   Procedure Details  Location: knee - R knee  Preparation: Patient was prepped and draped in the usual sterile fashion  Needle gauge: 21g.  Approach: lateral  Medications administered: 2 mL lidocaine PF 1% 1 %; 80 mg methylPREDNISolone acetate 80 MG/ML  Patient tolerance: patient tolerated the procedure well with no immediate complications      Large Joint Arthrocentesis: L knee  Date/Time: 12/18/2024 4:19 PM  Consent given by: patient  Site marked: site marked  Timeout: Immediately prior to procedure a time out was called to verify the correct patient, procedure, equipment, support staff and site/side marked as required   Supporting Documentation  Indications: pain   Procedure Details  Location: knee - L knee  Preparation: Patient was prepped and draped in the usual sterile fashion  Needle gauge: 21g.  Approach: lateral  Medications administered: 2 mL lidocaine PF 1% 1 %; 80 mg methylPREDNISolone acetate 80 MG/ML  Patient tolerance: patient tolerated the procedure well with no immediate complications

## 2024-12-19 RX ORDER — METHYLPREDNISOLONE ACETATE 80 MG/ML
80 INJECTION, SUSPENSION INTRA-ARTICULAR; INTRALESIONAL; INTRAMUSCULAR; SOFT TISSUE
Status: COMPLETED | OUTPATIENT
Start: 2024-12-18 | End: 2024-12-18

## 2024-12-19 RX ORDER — LIDOCAINE HYDROCHLORIDE 10 MG/ML
2 INJECTION, SOLUTION EPIDURAL; INFILTRATION; INTRACAUDAL; PERINEURAL
Status: COMPLETED | OUTPATIENT
Start: 2024-12-18 | End: 2024-12-18

## 2025-01-16 ENCOUNTER — OFFICE VISIT (OUTPATIENT)
Dept: NEUROLOGY | Facility: CLINIC | Age: 50
End: 2025-01-16
Payer: COMMERCIAL

## 2025-01-16 VITALS
WEIGHT: 187 LBS | DIASTOLIC BLOOD PRESSURE: 68 MMHG | HEIGHT: 69 IN | SYSTOLIC BLOOD PRESSURE: 108 MMHG | HEART RATE: 83 BPM | BODY MASS INDEX: 27.7 KG/M2 | OXYGEN SATURATION: 97 %

## 2025-01-16 DIAGNOSIS — G43.119 INTRACTABLE MIGRAINE WITH AURA WITHOUT STATUS MIGRAINOSUS: Primary | Chronic | ICD-10-CM

## 2025-01-16 PROCEDURE — 99213 OFFICE O/P EST LOW 20 MIN: CPT | Performed by: NURSE PRACTITIONER

## 2025-01-16 RX ORDER — FREMANEZUMAB-VFRM 225 MG/1.5ML
1.5 INJECTION SUBCUTANEOUS
Qty: 4.5 ML | Refills: 3 | Status: SHIPPED | OUTPATIENT
Start: 2025-01-16

## 2025-01-16 NOTE — PROGRESS NOTES
Subjective   Sadiq Alexander is a 49 y.o. male presenting for follow-up for migraine headaches.  He is taking Ajovy 225 mg every 30 days along with Nurtec 75 mg as needed.  He was previously on Zomig as needed but found the Nurtec to be much more helpful.  He takes diclofenac for chronic back pain.  He says that he does have frequent headaches on the left side of his head but they are low intensity and not overly bothersome to him.  He does not wish to make any medication changes at this time.    History of Present Illness     Review of Systems   Eyes:  Positive for photophobia (during migraines & phono), pain (occasionally during migraines) and visual disturbance (during migraines).   Gastrointestinal:  Positive for nausea (during migraines) and vomiting (during migraines).   Neurological:  Positive for dizziness (during migraines), light-headedness (during migraines) and headache.     I have reviewed and confirmed the accuracy of the patient's history: Chief complaint, HPI, ROS, Subjective, and Past Family Social History as entered by the MA/LPN/RN. Josi Hughes MA 01/16/25      Objective     Physical Examination:  General Appearance:  Well developed, well nourished, well groomed, alert, and cooperative.  HEENT: Normocephalic.    Neck and Spine: Normal range of motion.  Normal alignment. No mass or tenderness. No bruits.  Cardiac: Regular rate and rhythm. No murmurs.  Peripheral Vasculature: Radial and pedal pulses are equal and symmetric.   Extremities: No edema or deformities. Normal joint ROM.  Skin: No rashes or birth marks.      Neurological examination:   Higher Integrative Function: Oriented to time, place, and person. Normal registration, recall attention span and concentration. Normal language including comprehension, spontaneous speech, repetition, reading, writing, naming and vocabulary. No neglect with normal visual-spatial function and construction. Normal fund of knowledge and higher integrative  function.   CN II: Pupils are equal, round and reactive to light. Normal visual acuity and visual fields.   CN III IV VI: Extraocular movements are full without nystagmus.   CN V: Normal facial sensation and strength of muscles of mastication.   CN VII: Facial movements are symmetric. No weakness.   CN VIII: Auditory acuity is normal.  CN IX & X: Symmetric palatal movement.   CN XI: Sternocleidomastoid and trapezius are normal. No weakness.   CN XII: The tongue is midline. No atrophy or fasciculations.  Motor: Normal muscle strength, bulk and tone in upper and lower extremities. No fasciculations, rigidity, spasticity, or abnormal movements.   Reflexes: 2+ in the upper and lower extremities. Plantar responses are flexor.   Sensation: Normal light touch, pinprick, vibration, temperature, and proprioception in the arms and legs.   Station and Gait: Normal gait and station.   Coordination: Finger to nose test shows no dysmetria. Rapid alternating movements are normal. Heel to shin is normal.           Assessment & Plan   Diagnoses and all orders for this visit:    1. Intractable migraine with aura without status migrainosus (Primary)    Other orders  -     Fremanezumab-vfrm (Ajovy) 225 MG/1.5ML solution prefilled syringe; Inject 1.5 mL under the skin into the appropriate area as directed Every 30 (Thirty) Days.  Dispense: 4.5 mL; Refill: 3  -     rimegepant sulfate (NURTEC) 75 MG tablet; Take 1 tablet by mouth 1 (One) Time As Needed (Migraine). Max of 1 tablet per 24 hours.  Dispense: 16 tablet; Refill: 3    He is doing well.  He will continue Ajovy 225 mg every 30 days along with Nurtec 75 mg once daily as needed.  He will call with any problems.  Follow-up annually.

## 2025-02-03 DIAGNOSIS — K21.00 GASTROESOPHAGEAL REFLUX DISEASE WITH ESOPHAGITIS WITHOUT HEMORRHAGE: Chronic | ICD-10-CM

## 2025-02-03 RX ORDER — PANTOPRAZOLE SODIUM 40 MG/1
40 TABLET, DELAYED RELEASE ORAL DAILY
Qty: 90 TABLET | Refills: 1 | Status: SHIPPED | OUTPATIENT
Start: 2025-02-03

## 2025-02-24 ENCOUNTER — OFFICE VISIT (OUTPATIENT)
Dept: INTERNAL MEDICINE | Age: 50
End: 2025-02-24
Payer: COMMERCIAL

## 2025-02-24 VITALS
HEIGHT: 69 IN | SYSTOLIC BLOOD PRESSURE: 124 MMHG | WEIGHT: 186 LBS | HEART RATE: 74 BPM | BODY MASS INDEX: 27.55 KG/M2 | DIASTOLIC BLOOD PRESSURE: 82 MMHG | TEMPERATURE: 97.1 F | OXYGEN SATURATION: 99 %

## 2025-02-24 DIAGNOSIS — E78.00 PURE HYPERCHOLESTEROLEMIA: Chronic | ICD-10-CM

## 2025-02-24 DIAGNOSIS — R73.03 PREDIABETES: Chronic | ICD-10-CM

## 2025-02-24 DIAGNOSIS — E66.3 OVERWEIGHT (BMI 25.0-29.9): ICD-10-CM

## 2025-02-24 DIAGNOSIS — K21.00 GASTROESOPHAGEAL REFLUX DISEASE WITH ESOPHAGITIS WITHOUT HEMORRHAGE: Primary | Chronic | ICD-10-CM

## 2025-02-24 LAB
ALBUMIN SERPL-MCNC: 4.5 G/DL (ref 3.5–5.2)
ALBUMIN/GLOB SERPL: 2 G/DL
ALP SERPL-CCNC: 84 U/L (ref 39–117)
ALT SERPL-CCNC: 15 U/L (ref 1–41)
AST SERPL-CCNC: 16 U/L (ref 1–40)
BILIRUB SERPL-MCNC: 0.3 MG/DL (ref 0–1.2)
BUN SERPL-MCNC: 17 MG/DL (ref 6–20)
BUN/CREAT SERPL: 14.7 (ref 7–25)
CALCIUM SERPL-MCNC: 10.3 MG/DL (ref 8.6–10.5)
CHLORIDE SERPL-SCNC: 102 MMOL/L (ref 98–107)
CHOLEST SERPL-MCNC: 202 MG/DL (ref 0–200)
CHOLEST/HDLC SERPL: 4.59 {RATIO}
CO2 SERPL-SCNC: 25.1 MMOL/L (ref 22–29)
CREAT SERPL-MCNC: 1.16 MG/DL (ref 0.76–1.27)
EGFRCR SERPLBLD CKD-EPI 2021: 77.7 ML/MIN/1.73
GLOBULIN SER CALC-MCNC: 2.3 GM/DL
GLUCOSE SERPL-MCNC: 90 MG/DL (ref 65–99)
HBA1C MFR BLD: 5.9 % (ref 4.8–5.6)
HDLC SERPL-MCNC: 44 MG/DL (ref 40–60)
LDLC SERPL CALC-MCNC: 124 MG/DL (ref 0–100)
POTASSIUM SERPL-SCNC: 4.6 MMOL/L (ref 3.5–5.2)
PROT SERPL-MCNC: 6.8 G/DL (ref 6–8.5)
SODIUM SERPL-SCNC: 139 MMOL/L (ref 136–145)
TRIGL SERPL-MCNC: 193 MG/DL (ref 0–150)
VLDLC SERPL CALC-MCNC: 34 MG/DL (ref 5–40)

## 2025-02-24 PROCEDURE — 99214 OFFICE O/P EST MOD 30 MIN: CPT | Performed by: INTERNAL MEDICINE

## 2025-02-24 RX ORDER — FAMOTIDINE 20 MG/1
20 TABLET, FILM COATED ORAL 2 TIMES DAILY
Qty: 180 TABLET | Refills: 1 | Status: SHIPPED | OUTPATIENT
Start: 2025-02-24

## 2025-02-24 NOTE — ASSESSMENT & PLAN NOTE
He stopped pantoprazole due to GI side effects (bloating, diarrhea)  Does not wish to take another PPI at this time.     Start famotidine 20 mg BID   Minimize alcohol, caffeine, precaution with diclofenac, advised to quit smoking    Due for colonoscopy, recommended EGD as well with GI

## 2025-02-24 NOTE — ASSESSMENT & PLAN NOTE
Wt Readings from Last 3 Encounters:   02/24/25 84.4 kg (186 lb)   01/16/25 84.8 kg (187 lb)   12/18/24 86.3 kg (190 lb 4.8 oz)     Body mass index is 27.45 kg/m².     Maintain a low sugar/starch/carbohydrate diet.

## 2025-02-24 NOTE — PROGRESS NOTES
J  U  N  O  H    K  I  M ,   M  D       I  N  T  E  R  N  A  L    M  E  D  I  C  I  N  E         ENCOUNTER DATE:  02/24/2025    Sadiq HERNANDES Alexander / 49 y.o. / male    OFFICE VISIT ENCOUNTER       CHIEF COMPLAINT / REASON FOR OFFICE VISIT     Hyperlipidemia and  GERD with esophagitis      ASSESSMENT & PLAN     Problem List Items Addressed This Visit          High    GERD with esophagitis - Primary (Chronic)    Current Assessment & Plan     He stopped pantoprazole due to GI side effects (bloating, diarrhea)  Does not wish to take another PPI at this time.     Start famotidine 20 mg BID   Minimize alcohol, caffeine, precaution with diclofenac, advised to quit smoking    Due for colonoscopy, recommended EGD as well with GI          Relevant Medications    famotidine (Pepcid) 20 MG tablet       Medium    Hyperlipidemia (Chronic)    Overview     Continue rosuvastatin 10 mg qd.          Relevant Medications    rosuvastatin (CRESTOR) 10 MG tablet    Other Relevant Orders    Lipid Panel With / Chol / HDL Ratio    Comprehensive Metabolic Panel    Prediabetes (Chronic)    Current Assessment & Plan     Lab Results   Component Value Date    HGBA1C 5.80 (H) 11/30/2023    HGBA1C 5.70 (H) 05/31/2023    HGBA1C 6.0 (H) 08/17/2022      Check A1c. Maintain a low sugar/starch/carbohydrate diet. Weight loss noted.          Relevant Orders    Hemoglobin A1c       Low    Overweight (BMI 25.0-29.9) (Chronic)    Current Assessment & Plan     Wt Readings from Last 3 Encounters:   02/24/25 84.4 kg (186 lb)   01/16/25 84.8 kg (187 lb)   12/18/24 86.3 kg (190 lb 4.8 oz)     Body mass index is 27.45 kg/m².     Maintain a low sugar/starch/carbohydrate diet.           Orders Placed This Encounter   Procedures    Hemoglobin A1c     Order Specific Question:   Release to patient     Answer:   Routine Release [[id number]]    Lipid Panel With / Chol / HDL Ratio     Order Specific Question:   Release to patient     Answer:   Routine Release  "[[id number]]    Comprehensive Metabolic Panel     Order Specific Question:   Release to patient     Answer:   Routine Release [[id number]]     New Medications Ordered This Visit   Medications    famotidine (Pepcid) 20 MG tablet     Sig: Take 1 tablet by mouth 2 (Two) Times a Day.     Dispense:  180 tablet     Refill:  1       SUMMARY/DISCUSSION  Contact GI to schedule colonoscopy and EGD (recommended)      TOTAL TIME OF ENCOUNTER:        Next Appointment with me: Visit date not found    Return in about 6 months (around 8/24/2025) for Reassess chronic medical problems.      VITAL SIGNS     Vitals:    02/24/25 1408   BP: 124/82   Pulse: 74   Temp: 97.1 °F (36.2 °C)   SpO2: 99%   Weight: 84.4 kg (186 lb)   Height: 175.3 cm (69.02\")       BP Readings from Last 3 Encounters:   02/24/25 124/82   01/16/25 108/68   08/23/24 125/78     Wt Readings from Last 3 Encounters:   02/24/25 84.4 kg (186 lb)   01/16/25 84.8 kg (187 lb)   12/18/24 86.3 kg (190 lb 4.8 oz)     Body mass index is 27.45 kg/m².    Blood pressure readings recorded on patient flowsheet:       No data to display                  MEDICATIONS AT THE TIME OF OFFICE VISIT     Current Outpatient Medications on File Prior to Visit   Medication Sig    cetirizine (zyrTEC) 10 MG tablet Take 1 tablet by mouth Daily.    cyclobenzaprine (FLEXERIL) 10 MG tablet TAKE 1 TABLET BY MOUTH THREE TIMES A DAY (Patient taking differently: Take 1 tablet by mouth 3 (Three) Times a Day As Needed for Muscle Spasms.)    diclofenac (VOLTAREN) 50 MG EC tablet Take 1 tablet by mouth 2 (Two) Times a Day.    Fremanezumab-vfrm (Ajovy) 225 MG/1.5ML solution prefilled syringe Inject 1.5 mL under the skin into the appropriate area as directed Every 30 (Thirty) Days.    rimegepant sulfate (NURTEC) 75 MG tablet Take 1 tablet by mouth 1 (One) Time As Needed (Migraine). Max of 1 tablet per 24 hours.    rosuvastatin (CRESTOR) 10 MG tablet TAKE 1 TABLET BY MOUTH DAILY GENERIC EQUIVALENT FOR CRESTOR    " [DISCONTINUED] pantoprazole (PROTONIX) 40 MG EC tablet TAKE 1 TABLET BY MOUTH DAILY (Patient not taking: Reported on 2/24/2025)     No current facility-administered medications on file prior to visit.          HISTORY OF PRESENT ILLNESS     Patient reports to discontinuing pantoprazole due to bloating and other GI side effects.  He reports improvement in his symptoms after discontinuation.  EGD in 2018 showed evidence of esophagitis.  He denies dysphagia, odynophagia or early satiety symptoms.  He is on diclofenac 50 mg once daily for chronic back pain.  He denies melena or blood in the stool.  He is on rosuvastatin 10 mg for hyperlipidemia without significant problems.  Migraines remains stable on Ajovy.    Patient Care Team:  Charly Jackson MD as PCP - General (Internal Medicine)  Nathaniel Hurst MD as Consulting Physician (Pain Medicine)  Tommy Zimmer APRN as Nurse Practitioner (Neurology)  Aj Bone MD as Consulting Physician (Orthopedic Surgery)  Chato Allen MD as Consulting Physician (Gastroenterology)  Prabha Guerrero APRN as Nurse Practitioner (Nurse Practitioner)    REVIEW OF SYSTEMS     Review of Systems       PHYSICAL EXAMINATION     Physical Exam  Alert with normal thought and judgment.   Normal affect and mood.       REVIEWED DATA     Labs:     Lab Results   Component Value Date     08/22/2024    K 4.0 08/22/2024    CALCIUM 10.0 08/22/2024    AST 11 08/22/2024    ALT 16 08/22/2024    BUN 13 08/22/2024    CREATININE 1.20 08/22/2024    CREATININE 1.19 11/30/2023    CREATININE 1.11 05/31/2023     Lab Results   Component Value Date    HGBA1C 5.80 (H) 11/30/2023    HGBA1C 5.70 (H) 05/31/2023    HGBA1C 6.0 (H) 08/17/2022     Lab Results   Component Value Date     (H) 08/22/2024     (H) 11/30/2023     (H) 08/17/2022    HDL 37 (L) 08/22/2024    HDL 39 (L) 11/30/2023    TRIG 289 (H) 08/22/2024    TRIG 235 (H) 11/30/2023     Lab Results   Component Value Date  "   TSH 1.380 08/17/2022    TSH 0.867 06/20/2018    FREET4 1.29 08/17/2022    FREET4 1.10 06/20/2018     Lab Results   Component Value Date    WBC 13.17 (H) 11/30/2023    HGB 15.4 11/30/2023     11/30/2023     No results found for: \"MALBCRERATIO\"        Imaging:               Medical Tests:     ENDOSCOPY, INT (01/07/2019)           Summary of old records / correspondence / consultant report:             Request outside records:       "

## 2025-02-24 NOTE — ASSESSMENT & PLAN NOTE
Lab Results   Component Value Date    HGBA1C 5.80 (H) 11/30/2023    HGBA1C 5.70 (H) 05/31/2023    HGBA1C 6.0 (H) 08/17/2022      Check A1c. Maintain a low sugar/starch/carbohydrate diet. Weight loss noted.

## 2025-02-25 NOTE — PROGRESS NOTES
CALL PATIENT with results:    Cholesterol has improved significantly but LDL remains high.  If he is not experiencing any significant side effects from rosuvastatin 10 mg, recommend increasing to 20 mg daily and rechecking fasting lipid panel and LFT in 3 months.    Labs for kidney, liver and electrolytes are stable (i.e.  normal, stable, improved or without clinically significant worsening)     A1c level for blood sugar average is slightly higher and trending upwards.  Maintain low sugar/starch/carbohydrate diet.  Continue to minimize any alcohol.

## 2025-02-28 DIAGNOSIS — E78.5 HYPERLIPIDEMIA, UNSPECIFIED HYPERLIPIDEMIA TYPE: Chronic | ICD-10-CM

## 2025-02-28 RX ORDER — ROSUVASTATIN CALCIUM 20 MG/1
20 TABLET, COATED ORAL DAILY
Start: 2025-02-28

## 2025-02-28 NOTE — ASSESSMENT & PLAN NOTE
Lab Results   Component Value Date     (H) 02/24/2025     (H) 08/22/2024     (H) 11/30/2023    TRIG 193 (H) 02/24/2025    CHOLHDLRATIO 4.59 02/24/2025      Increase rosuvastatin 20 mg. Recheck labs in 3 months.

## 2025-03-10 DIAGNOSIS — E78.5 HYPERLIPIDEMIA, UNSPECIFIED HYPERLIPIDEMIA TYPE: Chronic | ICD-10-CM

## 2025-03-12 ENCOUNTER — TELEPHONE (OUTPATIENT)
Dept: PAIN MEDICINE | Facility: CLINIC | Age: 50
End: 2025-03-12
Payer: COMMERCIAL

## 2025-03-12 NOTE — TELEPHONE ENCOUNTER
LVM FOR PATIENT TO CALL BACK AND SCHEDULE FOLLOW UP WITH CIERA YAO IN ORDER TO GET SCHEDULED FOR AN ABLATION.  PLEASE SCHEDULE F/UP WITH CIERA YAO.

## 2025-03-13 NOTE — TELEPHONE ENCOUNTER
Pharmacy Name: [facility] - [place], [place] - [address] AT [address] - [phone] [facility] [phone]     Reference Number (if applicable): [id number]    Pharmacy representative name: [facility]    Pharmacy representative phone number: [phone]*    What medication are you calling in regards to: CRESTOR 10 MG    What question does the pharmacy have: CALLING TO REQUEST A REFILL OF CRESTOR 10 MG. THE DOSE CURRENTLY ON THE PATIENT'S MEDICATION LIST IS rosuvastatin (CRESTOR) 20 MG tablet . [facility] STATES THAT [facility] DOES NOT HAVE A PRESCRIPTION FOR THE 20 MG DOSE.    Who is the provider that prescribed the medication: DR. THACKER    Additional notes: PLEASE ADVISE.

## 2025-03-14 RX ORDER — ROSUVASTATIN CALCIUM 20 MG/1
20 TABLET, COATED ORAL NIGHTLY
Qty: 90 TABLET | Refills: 1 | Status: SHIPPED | OUTPATIENT
Start: 2025-03-14

## 2025-03-17 NOTE — TELEPHONE ENCOUNTER
Caller: JONAS ROBERTSON     Relationship: PATIENT     Best call back number: [phone]    PATIENT RETURNED CALL, SCHEDULED FOLLOW UP WITH LEONARD VANG TO DISCUSS FUTURE RFA - NO ACTION NEEDED

## 2025-03-21 ENCOUNTER — OFFICE VISIT (OUTPATIENT)
Dept: PAIN MEDICINE | Facility: CLINIC | Age: 50
End: 2025-03-21
Payer: COMMERCIAL

## 2025-03-21 ENCOUNTER — PREP FOR SURGERY (OUTPATIENT)
Dept: SURGERY | Facility: SURGERY CENTER | Age: 50
End: 2025-03-21
Payer: COMMERCIAL

## 2025-03-21 ENCOUNTER — LAB (OUTPATIENT)
Dept: LAB | Facility: HOSPITAL | Age: 50
End: 2025-03-21
Payer: COMMERCIAL

## 2025-03-21 VITALS
BODY MASS INDEX: 27.85 KG/M2 | HEIGHT: 69 IN | DIASTOLIC BLOOD PRESSURE: 82 MMHG | SYSTOLIC BLOOD PRESSURE: 110 MMHG | OXYGEN SATURATION: 98 % | WEIGHT: 188 LBS | HEART RATE: 69 BPM | TEMPERATURE: 96 F

## 2025-03-21 DIAGNOSIS — M54.50 CHRONIC MIDLINE LOW BACK PAIN WITHOUT SCIATICA: ICD-10-CM

## 2025-03-21 DIAGNOSIS — M47.816 LUMBAR FACET ARTHROPATHY: Primary | ICD-10-CM

## 2025-03-21 DIAGNOSIS — G89.29 CHRONIC MIDLINE LOW BACK PAIN WITHOUT SCIATICA: ICD-10-CM

## 2025-03-21 DIAGNOSIS — Z79.1 LONG TERM (CURRENT) USE OF NON-STEROIDAL ANTI-INFLAMMATORIES (NSAID): ICD-10-CM

## 2025-03-21 LAB
DEPRECATED RDW RBC AUTO: 46.2 FL (ref 37–54)
ERYTHROCYTE [DISTWIDTH] IN BLOOD BY AUTOMATED COUNT: 13.4 % (ref 12.3–15.4)
HCT VFR BLD AUTO: 44.4 % (ref 37.5–51)
HGB BLD-MCNC: 15.2 G/DL (ref 13–17.7)
MCH RBC QN AUTO: 31.6 PG (ref 26.6–33)
MCHC RBC AUTO-ENTMCNC: 34.2 G/DL (ref 31.5–35.7)
MCV RBC AUTO: 92.3 FL (ref 79–97)
PLATELET # BLD AUTO: 228 10*3/MM3 (ref 140–450)
PMV BLD AUTO: 10.2 FL (ref 6–12)
RBC # BLD AUTO: 4.81 10*6/MM3 (ref 4.14–5.8)
WBC NRBC COR # BLD AUTO: 8.85 10*3/MM3 (ref 3.4–10.8)

## 2025-03-21 PROCEDURE — 85027 COMPLETE CBC AUTOMATED: CPT

## 2025-03-21 PROCEDURE — 36415 COLL VENOUS BLD VENIPUNCTURE: CPT

## 2025-03-21 PROCEDURE — 99214 OFFICE O/P EST MOD 30 MIN: CPT | Performed by: NURSE PRACTITIONER

## 2025-03-21 NOTE — PROGRESS NOTES
CHIEF COMPLAINT    Back pain, worsened over the past month.    Subjective   Sadiq Alexander is a 49 y.o. male  who presents to the office for follow-up of procedure.  He completed a Bilateral L3-L5 radiofrequency ablation  on  8/8/24 performed by Dr. Hurst for management of back pain. Patient reports 80% relief from the procedure lasting 6 months.     Today his pain is 4/10VAS in severity. His reports that his axial low back pain started to increase approximately 1 month ago. He denies any lower extremity radicular pain. He denies any saddle anesthesia or B/B incontinence. He reports that his legs feel weak and that this is likely pain related. No objective weakness on exam.     Procedure list:  8/8/2024-bilateral L3-L5 RFA-80% relief x 6 months  8/31/2023-bilateral L3-L5 RFA-75% relief lasting over 6 months.  2/1/2023-bilateral L3-L5 RFA-75% relief x 5.5 months, relief is not beginning to wane.   6/16/2022-bilateral L3-L5 RFA-80% relief x ~5.5 months, relief is now beginning to wane, but has not fully returned to baseline.   11/18/2021-bilateral L3-L5 RFA-80 to 90% relief times ~5.5-6 months.   3/16/2021-bilateral L3-L5 RFA-80% relief x6 months  5/27/2020-bilateral L3-L5 RFA-60% relief    Back Pain  This is a chronic problem. The current episode started more than 1 year ago. The problem occurs constantly. The problem has been worse since onset. The pain is present in the lumbar spine. The quality of the pain is described as aching, burning and stabbing. The pain does not radiate. The pain is at a severity of 4/10. Worse during: worsens as day progresses. The symptoms are aggravated by bending, position, standing and twisting. Associated symptoms include bowel incontinence. Pertinent negatives include no abdominal pain, bladder incontinence, chest pain, dysuria, fever, headaches, numbness or weakness. He has tried chiropractic manipulation, heat, home exercises, analgesics, bed rest, ice, muscle relaxant, NSAIDs and  "walking (PT, MBB-significant diagnostic relief) for the symptoms. The treatment provided moderate (RFL) relief.      PEG Assessment   What number best describes your pain on average in the past week?6  What number best describes how, during the past week, pain has interfered with your enjoyment of life?9  What number best describes how, during the past week, pain has interfered with your general activity?  9    The following portions of the patient's history were reviewed and updated as appropriate: allergies, current medications, past family history, past medical history, past social history, past surgical history, and problem list.    Review of Systems   Constitutional:  Negative for chills and fever.   Respiratory:  Negative for cough and shortness of breath.    Cardiovascular:  Negative for chest pain.   Gastrointestinal:  Positive for bowel incontinence. Negative for abdominal pain, constipation and diarrhea.   Genitourinary:  Negative for bladder incontinence, difficulty urinating and dysuria.   Musculoskeletal:  Positive for back pain.   Neurological:  Negative for dizziness, weakness, light-headedness, numbness and headaches.   Psychiatric/Behavioral:  Negative for agitation.      --  The aforementioned information the Chief Complaint section and above subjective data including any HPI data, and also the Review of Systems data, has been personally reviewed and affirmed.  --     Vitals:    03/21/25 1142   BP: 110/82   BP Location: Left arm   Patient Position: Sitting   Pulse: 69   Temp: 96 °F (35.6 °C)   TempSrc: Temporal   SpO2: 98%   Weight: 85.3 kg (188 lb)   Height: 175.3 cm (69.02\")   PainSc: 4    PainLoc: Back     Objective   Physical Exam  Vitals and nursing note reviewed.   Constitutional:       Appearance: Normal appearance. He is well-developed.   Eyes:      General: Lids are normal.   Cardiovascular:      Rate and Rhythm: Normal rate.   Pulmonary:      Effort: Pulmonary effort is normal. "   Musculoskeletal:      Lumbar back: Tenderness and bony tenderness present. Decreased range of motion.      Comments:   +lumbar facet loading   Neurological:      Mental Status: He is alert and oriented to person, place, and time.      Motor: No weakness.   Psychiatric:         Attention and Perception: Attention normal.         Mood and Affect: Mood normal.         Speech: Speech normal.         Behavior: Behavior normal.         Judgment: Judgment normal.       Assessment & Plan   Diagnoses and all orders for this visit:    1. Lumbar facet arthropathy (Primary)  -     diclofenac (VOLTAREN) 50 MG EC tablet; Take 1 tablet by mouth 2 (Two) Times a Day.  Dispense: 60 tablet; Refill: 0    2. Chronic midline low back pain without sciatica      Sadiq Alexander reports a pain score of 4.  Given his pain assessment as noted, treatment options were discussed and the following options were decided upon as a follow-up plan to address the patient's pain: continuation of current treatment plan for pain.    Thermal Radiofrequency Destruction    This repeat thermal radiofrequency destruction (RFA) of cervical, thoracic, or lumbar paravertebral facet joint (medial branch) nerves at the same anatomical site is considered medically reasonable and necessary as this patient has met the criteria of having a minimum of consistent 50% improvement in pain for at least six (6) months or at least 50% consistent improvement in the ability to perform previously painful movements and ADLs as compared to baseline measurement using the same scale.    --- Repeat Bilateral L3-L5 RFA  Reviewed the procedure at length with the patient.  Included in the review was expectations, complications, risk and benefits.The procedure was described in detail and the risks, benefits and alternatives were discussed with the patient (including but not limited to: bleeding, infection, nerve damage, worsening of pain, inability to perform injection, paralysis,  seizures, coma, no pain relief and death) who agreed to proceed.  Discussed the potential for sedation if warranted/wanted.  The procedure will plan to be performed at [facility] with fluoroscopic guidance(unless ultrasound is indicated) and could potentially have steroids and contrast dye used. Questions were answered and in a way the patient could understand.  Patient verbalized understanding and wishes to proceed.  This intervention will be ordered.  Discussed with patient that all procedures are part of a multimodal plan of care and include either formal PT or a home exercise program.  Patient has no evidence of coagulopathy or current infection.    --- Follow-up after procedure     Dictated utilizing Dragon dictation.

## 2025-03-21 NOTE — H&P (VIEW-ONLY)
CHIEF COMPLAINT    Back pain, worsened over the past month.    Subjective   Sadiq Alexander is a 49 y.o. male  who presents to the office for follow-up of procedure.  He completed a Bilateral L3-L5 radiofrequency ablation  on  8/8/24 performed by Dr. Hurst for management of back pain. Patient reports 80% relief from the procedure lasting 6 months.     Today his pain is 4/10VAS in severity. His reports that his axial low back pain started to increase approximately 1 month ago. He denies any lower extremity radicular pain. He denies any saddle anesthesia or B/B incontinence. He reports that his legs feel weak and that this is likely pain related. No objective weakness on exam.     He has previously completed PT and has continued to participate in the HEP prescribed by PT.     Procedure list:  8/8/2024-bilateral L3-L5 RFA-80% relief x 6 months  8/31/2023-bilateral L3-L5 RFA-75% relief lasting over 6 months.  2/1/2023-bilateral L3-L5 RFA-75% relief x 5.5 months, relief is not beginning to wane.   6/16/2022-bilateral L3-L5 RFA-80% relief x ~5.5 months, relief is now beginning to wane, but has not fully returned to baseline.   11/18/2021-bilateral L3-L5 RFA-80 to 90% relief times ~5.5-6 months.   3/16/2021-bilateral L3-L5 RFA-80% relief x6 months  5/27/2020-bilateral L3-L5 RFA-60% relief    Back Pain  This is a chronic problem. The current episode started more than 1 year ago. The problem occurs constantly. The problem has been worse since onset. The pain is present in the lumbar spine. The quality of the pain is described as aching, burning and stabbing. The pain does not radiate. The pain is at a severity of 4/10. Worse during: worsens as day progresses. The symptoms are aggravated by bending, position, standing and twisting. Associated symptoms include bowel incontinence. Pertinent negatives include no abdominal pain, bladder incontinence, chest pain, dysuria, fever, headaches, numbness or weakness. He has tried  "chiropractic manipulation, heat, home exercises, analgesics, bed rest, ice, muscle relaxant, NSAIDs and walking (PT, MBB-significant diagnostic relief) for the symptoms. The treatment provided moderate (RFL) relief.      PEG Assessment   What number best describes your pain on average in the past week?6  What number best describes how, during the past week, pain has interfered with your enjoyment of life?9  What number best describes how, during the past week, pain has interfered with your general activity?  9    The following portions of the patient's history were reviewed and updated as appropriate: allergies, current medications, past family history, past medical history, past social history, past surgical history, and problem list.    Review of Systems   Constitutional:  Negative for chills and fever.   Respiratory:  Negative for cough and shortness of breath.    Cardiovascular:  Negative for chest pain.   Gastrointestinal:  Positive for bowel incontinence. Negative for abdominal pain, constipation and diarrhea.   Genitourinary:  Negative for bladder incontinence, difficulty urinating and dysuria.   Musculoskeletal:  Positive for back pain.   Neurological:  Negative for dizziness, weakness, light-headedness, numbness and headaches.   Psychiatric/Behavioral:  Negative for agitation.      --  The aforementioned information the Chief Complaint section and above subjective data including any HPI data, and also the Review of Systems data, has been personally reviewed and affirmed.  --     Vitals:    03/21/25 1142   BP: 110/82   BP Location: Left arm   Patient Position: Sitting   Pulse: 69   Temp: 96 °F (35.6 °C)   TempSrc: Temporal   SpO2: 98%   Weight: 85.3 kg (188 lb)   Height: 175.3 cm (69.02\")   PainSc: 4    PainLoc: Back     Objective   Physical Exam  Vitals and nursing note reviewed.   Constitutional:       Appearance: Normal appearance. He is well-developed.   Eyes:      General: Lids are normal. "   Cardiovascular:      Rate and Rhythm: Normal rate.   Pulmonary:      Effort: Pulmonary effort is normal.   Musculoskeletal:      Lumbar back: Tenderness and bony tenderness present. Decreased range of motion.      Comments:   +lumbar facet loading   Neurological:      Mental Status: He is alert and oriented to person, place, and time.      Motor: No weakness.   Psychiatric:         Attention and Perception: Attention normal.         Mood and Affect: Mood normal.         Speech: Speech normal.         Behavior: Behavior normal.         Judgment: Judgment normal.       Assessment & Plan   Diagnoses and all orders for this visit:    1. Lumbar facet arthropathy (Primary)  -     diclofenac (VOLTAREN) 50 MG EC tablet; Take 1 tablet by mouth 2 (Two) Times a Day.  Dispense: 60 tablet; Refill: 0    2. Chronic midline low back pain without sciatica      Sadiq Alexander reports a pain score of 4.  Given his pain assessment as noted, treatment options were discussed and the following options were decided upon as a follow-up plan to address the patient's pain: continuation of current treatment plan for pain.    Thermal Radiofrequency Destruction    This repeat thermal radiofrequency destruction (RFA) of cervical, thoracic, or lumbar paravertebral facet joint (medial branch) nerves at the same anatomical site is considered medically reasonable and necessary as this patient has met the criteria of having a minimum of consistent 50% improvement in pain for at least six (6) months or at least 50% consistent improvement in the ability to perform previously painful movements and ADLs as compared to baseline measurement using the same scale.    --- Repeat Bilateral L3-L5 RFA  Reviewed the procedure at length with the patient.  Included in the review was expectations, complications, risk and benefits.The procedure was described in detail and the risks, benefits and alternatives were discussed with the patient (including but not limited  to: bleeding, infection, nerve damage, worsening of pain, inability to perform injection, paralysis, seizures, coma, no pain relief and death) who agreed to proceed.  Discussed the potential for sedation if warranted/wanted.  The procedure will plan to be performed at [facility] with fluoroscopic guidance(unless ultrasound is indicated) and could potentially have steroids and contrast dye used. Questions were answered and in a way the patient could understand.  Patient verbalized understanding and wishes to proceed.  This intervention will be ordered.  Discussed with patient that all procedures are part of a multimodal plan of care and include either formal PT or a home exercise program.  Patient has no evidence of coagulopathy or current infection.    --- Follow-up after procedure     Dictated utilizing Dragon dictation.

## 2025-03-24 ENCOUNTER — TRANSCRIBE ORDERS (OUTPATIENT)
Dept: SURGERY | Facility: SURGERY CENTER | Age: 50
End: 2025-03-24
Payer: COMMERCIAL

## 2025-03-24 DIAGNOSIS — Z41.9 SURGERY, ELECTIVE: Primary | ICD-10-CM

## 2025-04-10 NOTE — PROGRESS NOTES
Left msg for pt to return our call.  Refill sent for Ozempic.  Please notify patient to schedule follow-up with medical therapy management team with Dr. Clair Booker.

## 2025-04-18 ENCOUNTER — HOSPITAL ENCOUNTER (OUTPATIENT)
Facility: SURGERY CENTER | Age: 50
Setting detail: HOSPITAL OUTPATIENT SURGERY
Discharge: HOME | End: 2025-04-18
Attending: ANESTHESIOLOGY | Admitting: ANESTHESIOLOGY
Payer: COMMERCIAL

## 2025-04-18 ENCOUNTER — HOSPITAL ENCOUNTER (OUTPATIENT)
Dept: GENERAL RADIOLOGY | Facility: SURGERY CENTER | Age: 50
Setting detail: HOSPITAL OUTPATIENT SURGERY
End: 2025-04-18
Payer: COMMERCIAL

## 2025-04-18 VITALS
DIASTOLIC BLOOD PRESSURE: 95 MMHG | BODY MASS INDEX: 28.14 KG/M2 | WEIGHT: 190 LBS | OXYGEN SATURATION: 95 % | SYSTOLIC BLOOD PRESSURE: 117 MMHG | HEIGHT: 69 IN | HEART RATE: 77 BPM | TEMPERATURE: 97.8 F | RESPIRATION RATE: 16 BRPM

## 2025-04-18 DIAGNOSIS — Z41.9 SURGERY, ELECTIVE: ICD-10-CM

## 2025-04-18 PROCEDURE — 25010000002 LIDOCAINE PF 2% 2 % SOLUTION 5 ML VIAL: Performed by: ANESTHESIOLOGY

## 2025-04-18 PROCEDURE — 25010000002 BUPIVACAINE (PF) 0.5 % SOLUTION 10 ML VIAL: Performed by: ANESTHESIOLOGY

## 2025-04-18 PROCEDURE — 76000 FLUOROSCOPY <1 HR PHYS/QHP: CPT

## 2025-04-18 PROCEDURE — 64636 DESTROY L/S FACET JNT ADDL: CPT | Performed by: ANESTHESIOLOGY

## 2025-04-18 PROCEDURE — 64635 DESTROY LUMB/SAC FACET JNT: CPT | Performed by: ANESTHESIOLOGY

## 2025-04-18 PROCEDURE — 25010000002 LIDOCAINE PF 1% 1 % SOLUTION 30 ML VIAL: Performed by: ANESTHESIOLOGY

## 2025-04-18 NOTE — OP NOTE
Bilateral L3-5 Lumbar Medial Branch RADIOFREQUENCY  [facility]      PREOPERATIVE DIAGNOSIS:  Lumbar spondylosis without myelopathy    POSTOPERATIVE DIAGNOSIS:  Lumbar spondylosis without myelopathy    PROCEDURE:   Diagnostic Bilateral Lumbar Medial Branch Nerve thermal radiofrequency lesioning, with fluoroscopy:  L3, L4, and L5 nerves (at the L4 and L5 transverse processes and the sacral alar groove) to thermally treat the innervation to facet joints L4-5 and L5-S1  78608-68 -- Bilateral L/S facet neuro destr., 1st Level  10446-68 -- Bilateral L/S facet neuro destr., 2nd  Level    PRE-PROCEDURE DISCUSSION WITH PATIENT:    Risks and complications were discussed with the patient prior to starting the procedure and informed consent was obtained.      SURGEON:  Nathaniel Hurst MD    REASON FOR PROCEDURE:    The patient complains of pain that seems to have a significant axial component and Previous clinically significant therapeutic effect after prior Radiofrequency procedure    SEDATION:  Patient declined administration of moderate sedation      ANESTHETIC:  Lidocaine 2%  STEROID:  NONE      DESCRIPTON OF PROCEDURE:  After obtaining informed consent, IV access  was not obtained in the preoperative area.   The patient was taken to the operating room.  The patient was placed in the prone position with a pillow under the abdomen. All pressure points were well padded.  EKG, blood pressure, and pulse oximeter were monitored.  The patient was monitored and sedated by the RN under my direction. The lumbosacral area was prepped with Chloraprep and draped in a sterile fashion.     Under fluoroscopic guidance the transverse processes of the L4 and L5 vertebrae at the junctions of the superior articular processes were identified on the right.  Also identified was the groove between the ala and the superior articular process of the sacrum on the ipsilateral side.  Skin and subcutaneous tissue were anesthetized  with 1ml of 1% lidocaine above each of these points. Then, radiofrequency probe needles were advanced in this fluoro view to the above junctions.  Aspiration was negative for blood and CSF.  After confirming the position of the needle with fluoroscope in all views, testing was initiated.  First, sensory testing was started on each needle a 1V and 50Hz and slowly decreased until painful pressure stimulation diminished at 0.5V.  Next, motor testing was confirmed to be negative at 3V and 2Hz for any radicular stimulation.  Then 1mL of the local anesthetic was instilled in each needle.  Two minutes elapsed, and during this time a lateral fluoroscopic view was confirmed again to ensure the needles had not advanced nor retracted.  Then, Radiofrequency Lesioning was initiated for 2 minutes at 80 degrees Celsius.  Needles were removed intact from each of the areas.     A similar procedure was repeated to address the L3, L4, and L5 nerves on the contralateral side.   Onset of analgesia was noted.  Vital signs remained stable throughout.      ESTIMATED BLOOD LOSS:  <5 mL  SPECIMENS:  none    COMPLICATIONS:   No complications were noted. and The patient did not have any signs of postprocedure numbness nor weakness.    TOLERANCE & DISCHARGE CONDITION:    The patient tolerated the procedure well.  The patient was transported to the recovery area without difficulties.  The patient was discharged to home under the care of family in stable and satisfactory condition.    PLAN OF CARE:  The patient was given our standard instruction sheet.  The patient will  Plan for follow up 6 months .  The patient will resume all medications as per the medication reconciliation sheet.

## 2025-04-18 NOTE — DISCHARGE INSTRUCTIONS
[facility] - Post-procedure Instructions          --  While there are no absolute restrictions, it is recommended that you do not perform strenuous activity today. In the morning, you may resume your level of activity as before your block.    --  If you have a band-aid at your injection site, please remove it later today. Observe the area for any redness, swelling, pus-like drainage, or a temperature over 101°. If any of these symptoms occur, please call your doctor at [phone]. If after office hours, leave a message and the on-call provider will return your call.    --  Ice may be applied to your injection site. It is recommended you avoid direct heat (heating pad; hot tub) for 1-2 days.    --  Call [facility] at [phone] if you experience persistent headache, persistent bleeding from the injection site, or severe pain not relieved by heat or oral medication.    --  Do not make important decisions today.    --  Due to the effects of the block and/or the I.V. Sedation, DO NOT drive or operate hazardous machinery for 12 hours.  Local anesthetics may cause numbness after procedure and precautions must be taken with regards to operating equipment as well as with walking, even if ambulating with assistance of another person or with an assistive device.    --  Do not drink alcohol for 12 hours.    -- You may return to work tomorrow, or as directed by your referring doctor.    --  Occasionally you may notice a slight increase in your pain after the procedure. This should start to improve within the next 24-48 hours. Radiofrequency ablation procedure pain may last 3-4 weeks.    --  It may take as long as 3-4 days before you notice a gradual improvement in your pain and/or other symptoms.    -- You may continue to take your prescribed pain medication as needed.    --  Some normal possible side effects of steroid use could include fluid retention, increased blood sugar, dull headache,  increased sweating, increased appetite, mood swings and flushing.    --  Diabetics are recommended to watch their blood glucose level closely for 24-48 hours after the injection.    --  Must stay in PACU for 20 min upon arrival and prove no leg weakness before being discharged.    --  IN THE EVENT OF A LIFE THREATENING EMERGENCY, (CHEST PAIN, BREATHING DIFFICULTIES, PARALYSIS…) YOU SHOULD GO TO YOUR NEAREST EMERGENCY ROOM.    --  You should be contacted by our office within 2-3 days to schedule follow up or next appointment date.  If not contacted within 7 days, please call the office at [phone]

## 2025-08-05 DIAGNOSIS — M47.816 LUMBAR FACET ARTHROPATHY: ICD-10-CM

## 2025-08-23 DIAGNOSIS — E78.5 HYPERLIPIDEMIA, UNSPECIFIED HYPERLIPIDEMIA TYPE: Chronic | ICD-10-CM

## 2025-08-25 ENCOUNTER — OFFICE VISIT (OUTPATIENT)
Dept: INTERNAL MEDICINE | Age: 50
End: 2025-08-25
Payer: COMMERCIAL

## 2025-08-25 VITALS
BODY MASS INDEX: 28.44 KG/M2 | WEIGHT: 192 LBS | HEIGHT: 69 IN | TEMPERATURE: 96.9 F | HEART RATE: 67 BPM | SYSTOLIC BLOOD PRESSURE: 120 MMHG | OXYGEN SATURATION: 98 % | DIASTOLIC BLOOD PRESSURE: 82 MMHG

## 2025-08-25 DIAGNOSIS — E78.00 PURE HYPERCHOLESTEROLEMIA: Primary | Chronic | ICD-10-CM

## 2025-08-25 DIAGNOSIS — R73.03 PREDIABETES: Chronic | ICD-10-CM

## 2025-08-25 DIAGNOSIS — K21.00 GASTROESOPHAGEAL REFLUX DISEASE WITH ESOPHAGITIS WITHOUT HEMORRHAGE: Chronic | ICD-10-CM

## 2025-08-25 LAB
ALBUMIN SERPL-MCNC: 4.4 G/DL (ref 3.5–5.2)
ALBUMIN/GLOB SERPL: 2.2 G/DL
ALP SERPL-CCNC: 77 U/L (ref 39–117)
ALT SERPL-CCNC: 18 U/L (ref 1–41)
AST SERPL-CCNC: 19 U/L (ref 1–40)
BILIRUB SERPL-MCNC: 0.4 MG/DL (ref 0–1.2)
BUN SERPL-MCNC: 17 MG/DL (ref 6–20)
BUN/CREAT SERPL: 13.2 (ref 7–25)
CALCIUM SERPL-MCNC: 9.8 MG/DL (ref 8.6–10.5)
CHLORIDE SERPL-SCNC: 105 MMOL/L (ref 98–107)
CHOLEST SERPL-MCNC: 234 MG/DL (ref 0–200)
CHOLEST/HDLC SERPL: 6.5 {RATIO}
CO2 SERPL-SCNC: 23.9 MMOL/L (ref 22–29)
CREAT SERPL-MCNC: 1.29 MG/DL (ref 0.76–1.27)
EGFRCR SERPLBLD CKD-EPI 2021: 68 ML/MIN/1.73
GLOBULIN SER CALC-MCNC: 2 GM/DL
GLUCOSE SERPL-MCNC: 103 MG/DL (ref 65–99)
HBA1C MFR BLD: 6 % (ref 4.8–5.6)
HDLC SERPL-MCNC: 36 MG/DL (ref 40–60)
LDLC SERPL CALC-MCNC: 159 MG/DL (ref 0–100)
POTASSIUM SERPL-SCNC: 4.5 MMOL/L (ref 3.5–5.2)
PROT SERPL-MCNC: 6.4 G/DL (ref 6–8.5)
SODIUM SERPL-SCNC: 140 MMOL/L (ref 136–145)
TRIGL SERPL-MCNC: 211 MG/DL (ref 0–150)
VLDLC SERPL CALC-MCNC: 39 MG/DL (ref 5–40)

## 2025-08-25 PROCEDURE — 99214 OFFICE O/P EST MOD 30 MIN: CPT | Performed by: INTERNAL MEDICINE

## 2025-08-25 RX ORDER — FREMANEZUMAB-VFRM 225 MG/1.5ML
INJECTION SUBCUTANEOUS
Qty: 4.5 ML | Refills: 3 | Status: SHIPPED | OUTPATIENT
Start: 2025-08-25

## 2025-08-25 RX ORDER — ROSUVASTATIN CALCIUM 20 MG/1
TABLET, COATED ORAL
Qty: 90 TABLET | Refills: 1 | Status: SHIPPED | OUTPATIENT
Start: 2025-08-25

## 2025-08-25 RX ORDER — FAMOTIDINE 20 MG/1
20 TABLET, FILM COATED ORAL 2 TIMES DAILY
Qty: 180 TABLET | Refills: 1 | Status: SHIPPED | OUTPATIENT
Start: 2025-08-25

## (undated) DEVICE — Device

## (undated) DEVICE — GLV SURG TRIUMPH PF LTX 7.5 STRL

## (undated) DEVICE — SUT MNCRYL 4/0 PS2 18 IN

## (undated) DEVICE — EXTENSION SET, MALE LUER LOCK ADAPTER WITH RETRACTABLE COLLAR

## (undated) DEVICE — BNDG ADHS CURAD FLX/FABRC 2X4IN STRL LF

## (undated) DEVICE — EPIDURAL TRAY: Brand: MEDLINE INDUSTRIES, INC.

## (undated) DEVICE — LOU LAP CHOLE: Brand: MEDLINE INDUSTRIES, INC.

## (undated) DEVICE — 3M™ STERI-STRIP™ COMPOUND BENZOIN TINCTURE 40 BAGS/CARTON 4 CARTONS/CASE C1544: Brand: 3M™ STERI-STRIP™

## (undated) DEVICE — APPL CHLORAPREP W/TINT 26ML ORNG

## (undated) DEVICE — PAD GRND E/S NT200IX RF/GEN W/CABL DISP

## (undated) DEVICE — TOWEL,OR,DSP,ST,BLUE,STD,4/PK,20PK/CS: Brand: MEDLINE

## (undated) DEVICE — CATH IV INSYTE AUTOGARD 14G 1 1/2IN ORNG

## (undated) DEVICE — VISUALIZATION SYSTEM: Brand: CLEARIFY

## (undated) DEVICE — DRP C/ARM 41X74IN

## (undated) DEVICE — DRAPE,REIN 53X77,STERILE: Brand: MEDLINE

## (undated) DEVICE — ENDOPATH XCEL UNIVERSAL TROCAR STABLILITY SLEEVES: Brand: ENDOPATH XCEL

## (undated) DEVICE — GLV SURG BIOGEL LTX PF 8 1/2

## (undated) DEVICE — KTTNER ENDO BLNT DISSCT

## (undated) DEVICE — PAD GRND NEUROTHERM RF WO/CABL DISP

## (undated) DEVICE — CANN ACTIVETIP CRV 18G 10MM 10CM

## (undated) DEVICE — ENDOCUT SCISSOR TIP, DISPOSABLE: Brand: RENEW

## (undated) DEVICE — ENDOPOUCH RETRIEVER SPECIMEN RETRIEVAL BAGS: Brand: ENDOPOUCH RETRIEVER

## (undated) DEVICE — PENCL E/S HNDSWCH ROCKR CB

## (undated) DEVICE — VIOLET BRAIDED (POLYGLACTIN 910), SYNTHETIC ABSORBABLE SUTURE: Brand: COATED VICRYL

## (undated) DEVICE — PAD GRND CATHAY W/CABL DISP

## (undated) DEVICE — ENDOPATH XCEL BLUNT TIP TROCARS WITH SMOOTH SLEEVES: Brand: ENDOPATH XCEL

## (undated) DEVICE — STPCK 3WY D201 DISCOFIX

## (undated) DEVICE — ENDOPATH XCEL BLADELESS TROCARS WITH STABILITY SLEEVES: Brand: ENDOPATH XCEL

## (undated) DEVICE — 3M™ STERI-STRIP™ REINFORCED ADHESIVE SKIN CLOSURES, R1547, 1/2 IN X 4 IN (12 MM X 100 MM), 6 STRIPS/ENVELOPE: Brand: 3M™ STERI-STRIP™

## (undated) DEVICE — CONTAINER,SPECIMEN,OR STERILE,4OZ: Brand: MEDLINE

## (undated) DEVICE — SOL NACL 0.9PCT 1000ML

## (undated) DEVICE — CATH CHOLANG 4.5F18IN BRGNDY